# Patient Record
Sex: MALE | Race: WHITE | NOT HISPANIC OR LATINO | Employment: OTHER | URBAN - METROPOLITAN AREA
[De-identification: names, ages, dates, MRNs, and addresses within clinical notes are randomized per-mention and may not be internally consistent; named-entity substitution may affect disease eponyms.]

---

## 2017-04-14 ENCOUNTER — ALLSCRIPTS OFFICE VISIT (OUTPATIENT)
Dept: OTHER | Facility: OTHER | Age: 62
End: 2017-04-14

## 2018-01-12 VITALS
BODY MASS INDEX: 29.25 KG/M2 | HEART RATE: 54 BPM | DIASTOLIC BLOOD PRESSURE: 72 MMHG | HEIGHT: 66 IN | RESPIRATION RATE: 16 BRPM | OXYGEN SATURATION: 99 % | WEIGHT: 182 LBS | SYSTOLIC BLOOD PRESSURE: 150 MMHG

## 2018-01-23 ENCOUNTER — ALLSCRIPTS OFFICE VISIT (OUTPATIENT)
Dept: OTHER | Facility: OTHER | Age: 63
End: 2018-01-23

## 2018-01-23 DIAGNOSIS — E55.9 VITAMIN D DEFICIENCY: ICD-10-CM

## 2018-01-23 DIAGNOSIS — D56.8: ICD-10-CM

## 2018-01-23 DIAGNOSIS — I10 ESSENTIAL (PRIMARY) HYPERTENSION: ICD-10-CM

## 2018-01-24 ENCOUNTER — TELEPHONE (OUTPATIENT)
Dept: INTERNAL MEDICINE CLINIC | Facility: CLINIC | Age: 63
End: 2018-01-24

## 2018-01-24 ENCOUNTER — APPOINTMENT (OUTPATIENT)
Dept: LAB | Facility: CLINIC | Age: 63
End: 2018-01-24
Payer: MEDICARE

## 2018-01-24 DIAGNOSIS — I10 ESSENTIAL (PRIMARY) HYPERTENSION: ICD-10-CM

## 2018-01-24 DIAGNOSIS — D56.8: ICD-10-CM

## 2018-01-24 DIAGNOSIS — E55.9 VITAMIN D DEFICIENCY: ICD-10-CM

## 2018-01-24 LAB
25(OH)D3 SERPL-MCNC: 45.4 NG/ML (ref 30–100)
ALBUMIN SERPL BCP-MCNC: 3.6 G/DL (ref 3.5–5)
ALP SERPL-CCNC: 54 U/L (ref 46–116)
ALT SERPL W P-5'-P-CCNC: 64 U/L (ref 12–78)
ANION GAP SERPL CALCULATED.3IONS-SCNC: 4 MMOL/L (ref 4–13)
AST SERPL W P-5'-P-CCNC: 19 U/L (ref 5–45)
BASOPHILS # BLD AUTO: 0.04 THOUSANDS/ΜL (ref 0–0.1)
BASOPHILS NFR BLD AUTO: 1 % (ref 0–1)
BILIRUB SERPL-MCNC: 1.1 MG/DL (ref 0.2–1)
BUN SERPL-MCNC: 17 MG/DL (ref 5–25)
CALCIUM SERPL-MCNC: 8.8 MG/DL (ref 8.3–10.1)
CHLORIDE SERPL-SCNC: 105 MMOL/L (ref 100–108)
CHOLEST SERPL-MCNC: 116 MG/DL (ref 50–200)
CO2 SERPL-SCNC: 32 MMOL/L (ref 21–32)
CREAT SERPL-MCNC: 0.98 MG/DL (ref 0.6–1.3)
EOSINOPHIL # BLD AUTO: 0.34 THOUSAND/ΜL (ref 0–0.61)
EOSINOPHIL NFR BLD AUTO: 5 % (ref 0–6)
ERYTHROCYTE [DISTWIDTH] IN BLOOD BY AUTOMATED COUNT: 16.6 % (ref 11.6–15.1)
GFR SERPL CREATININE-BSD FRML MDRD: 81 ML/MIN/1.73SQ M
GLUCOSE P FAST SERPL-MCNC: 99 MG/DL (ref 65–99)
HCT VFR BLD AUTO: 32.7 % (ref 36.5–49.3)
HDLC SERPL-MCNC: 45 MG/DL (ref 40–60)
HGB BLD-MCNC: 10.5 G/DL (ref 12–17)
LDLC SERPL CALC-MCNC: 56 MG/DL (ref 0–100)
LYMPHOCYTES # BLD AUTO: 0.78 THOUSANDS/ΜL (ref 0.6–4.47)
LYMPHOCYTES NFR BLD AUTO: 11 % (ref 14–44)
MCH RBC QN AUTO: 18.9 PG (ref 26.8–34.3)
MCHC RBC AUTO-ENTMCNC: 32.1 G/DL (ref 31.4–37.4)
MCV RBC AUTO: 59 FL (ref 82–98)
MONOCYTES # BLD AUTO: 0.49 THOUSAND/ΜL (ref 0.17–1.22)
MONOCYTES NFR BLD AUTO: 7 % (ref 4–12)
NEUTROPHILS # BLD AUTO: 5.38 THOUSANDS/ΜL (ref 1.85–7.62)
NEUTS SEG NFR BLD AUTO: 76 % (ref 43–75)
PLATELET # BLD AUTO: 313 THOUSANDS/UL (ref 149–390)
PMV BLD AUTO: 10.3 FL (ref 8.9–12.7)
POTASSIUM SERPL-SCNC: 4.2 MMOL/L (ref 3.5–5.3)
PROT SERPL-MCNC: 7 G/DL (ref 6.4–8.2)
RBC # BLD AUTO: 5.56 MILLION/UL (ref 3.88–5.62)
SODIUM SERPL-SCNC: 141 MMOL/L (ref 136–145)
TRIGL SERPL-MCNC: 73 MG/DL
WBC # BLD AUTO: 7.03 THOUSAND/UL (ref 4.31–10.16)

## 2018-01-24 PROCEDURE — 82306 VITAMIN D 25 HYDROXY: CPT

## 2018-01-24 PROCEDURE — 80061 LIPID PANEL: CPT

## 2018-01-24 PROCEDURE — 85025 COMPLETE CBC W/AUTO DIFF WBC: CPT

## 2018-01-24 PROCEDURE — 80053 COMPREHEN METABOLIC PANEL: CPT

## 2018-01-24 PROCEDURE — 36415 COLL VENOUS BLD VENIPUNCTURE: CPT

## 2018-01-24 NOTE — PROGRESS NOTES
Assessment    1  Hypertension (401 9) (I10)   2  Screen for colon cancer (V76 51) (Z12 11)   3  Beta+ thalassemia, normal Hb A2, type 1, silent (282 49) (D56 8)   4  Low vitamin D level (268 9) (E55 9)   5  Benign colon polyp (211 3) (K63 5)   6  Need for zoster vaccination (V04 89) (Z23)   7  Family history of colon cancer (V16 0) (Z80 0) : Brother    Plan  Benign colon polyp, FamHx: Family history of colon cancer, Screen for colon cancer    · 2 - *EYVAZ&REILLYCOL ( COLORECTAL SURGERY, GASTROENTEROLOGY)  Co-Management  71 y/o hx of colonoscopy 4 years  ago    brother with new dx of colon cancer, needs updated colonoscopy? Status: Active   Requested for: 02EWI2402  Care Summary provided  : Yes  Beta+ thalassemia, normal Hb A2, type 1, silent    · (1) CBC/PLT/DIFF; Status:Active; Requested GTR:[id number];   Hypertension    · AmLODIPine Besylate 5 MG Oral Tablet; TAKE ONE TABLET BY MOUTH EVERY  DAY   · Lisinopril-Hydrochlorothiazide 20-12 5 MG Oral Tablet; TAKE 2 TABLETS DAILY   · (1) COMPREHENSIVE METABOLIC PANEL; Status:Active; Requested GRB:[id number];    · (1) LIPID PANEL FASTING W DIRECT LDL REFLEX; Status:Active; Requested  BOP:[id number]; Low vitamin D level    · (1) VITAMIN D 25-HYDROXY; Status:Active; Requested GISELE:[id number];   Need for pneumococcal vaccination    · Prevnar 13 Intramuscular Suspension  Need for zoster vaccination    · Zostavax 20728 UNT/0 65ML Subcutaneous Solution Reconstituted (Zostavax  84374 UNT/0 65ML Subcutaneous Solution Reconstituted); ADMINISTER SHINGLES  VACCINE ONE TIME AS DIRECTED    Discussion/Summary  Discussion Summary:   1  prevnar 13 VACCINE today  2  fasting blood work  3  medications refilled  4  talk with Dr Carrol Sampson about colonoscopy  5  return in 1 month office visit  6  check to see if shingles vaccine is covered by insurance to be given in office and let us know before your appointment so we can order     Counseling Documentation With Imm: The patient was counseled regarding diagnostic results, instructions for management, patient and family education, impressions, risks and benefits of treatment options  Immunization Counseling The parent/guardian was counseled on the following vaccine components: pneumonia and zostavax  Total number of vaccine components counseled: 2  total time of encounter was >40 minutes and >50% minutes was spent counseling  Medication SE Review and Pt Understands Tx: Possible side effects of new medications were reviewed with the patient/guardian today  The treatment plan was reviewed with the patient/guardian  The patient/guardian understands and agrees with the treatment plan      Chief Complaint  Chief Complaint Free Text Note Form: Patient is here as a new patient for a check up      History of Present Illness  HPI: 69 y/o M new to practice here to establish care    as above - reviewed meds with patient  takes CCB/ACEI/diuretic for HTN usually with good control and no SE  running low on amlodipine and taking every other day recently    UTD on flu vaccine, requests shingles vaccine which we discussed today    never had pneumonia vaccine in past  exercises regularly - mostly push-ups/sit ups and non-equipment related exercises    hx of thalessemia, was being monitored by Dr Martell Cross for this in past    taking Vit D 5000 IU per day, unsure if had level checked before    had colonoscopy in 2014, but brother(51 y/o) just dx'd with colon mass/suspected cancer and having operation to treat  requests ref to have colonoscopy again now    he is worried he may have OA of fingers, plays guitar regularly  Vijay Warren declines x-rays or further work up at this time as sx not bothersome enough    denies any other complaints      Review of Systems  Complete-Male:   Constitutional: no fever  Eyes: no eyesight problems  ENT: no sore throat  Cardiovascular: no chest pain  Respiratory: no shortness of breath  Gastrointestinal: no abdominal pain  Genitourinary: no dysuria  Musculoskeletal: no arthralgias  Integumentary: no rashes  Neurological: no confusion  Psychiatric: no depression  Endocrine: no feelings of weakness  ROS Reviewed:   ROS reviewed  Active Problems    1  Anemia (285 9) (D64 9)   2  Benign colon polyp (211 3) (K63 5)   3  Beta+ thalassemia, normal Hb A2, type 1, silent (282 49) (D56 8)   4  Hemorrhoids (455 6) (K64 9)   5  Hypertension (401 9) (I10)   6  Hypochromic/Microcytic Thalassemia (282 49)   7  Low back pain (724 2) (M54 5)   8  Need for zoster vaccination (V04 89) (Z23)   9  Pain in joint of right shoulder region (719 41) (M25 511)   10  Sciatica (724 3) (M54 30)   11  Seasonal allergies (477 9) (J30 2)   12  Sinusitis (473 9) (J32 9)   13  Tinea pedis (110 4) (B35 3)   14  Upper respiratory infection (465 9) (J06 9)    Past Medical History    1  History of Cervicalgia (723 1) (M54 2)   2  History of External Hemorrhoids (455 3)   3  History of Hay fever (477 9) (J30 1)   4  History of Herniated cervical disc (722 0) (M50 20)   5  History of benign prostatic hypertrophy (V13 89) (Z87 438)   6  History of Internal Hemorrhoids (455 0)  Active Problems And Past Medical History Reviewed: The active problems and past medical history were reviewed and updated today  Surgical History    1  History of Skin Tag Removal   2  History of Surgery Vas Deferens Vasectomy  Surgical History Reviewed: The surgical history was reviewed and updated today  Family History  Mother    1  Denied: Family history of Alcoholism and drug addiction in family   2  Denied: Family history of Anxiety and depression   3  Family history of thalassemia (V18 3) (Z83 2)   4  Family history of Heart valve replaced  Father    5  Denied: Family history of Alcoholism and drug addiction in family   6  Denied: Family history of Anxiety and depression   7  Family history of hypertension (V17 49) (Z82 49)   8   Family history of malignant neoplasm of stomach (V16 0) (Z80 0)  Child    9  Denied: Family history of Alcoholism and drug addiction in family   8  Denied: Family history of Anxiety and depression  Sibling    6  Denied: Family history of Alcoholism and drug addiction in family   15  Denied: Family history of Anxiety and depression  Brother    15  Family history of colon cancer (V16 0) (Z80 0)  Family History Reviewed: The family history was reviewed and updated today  Social History    · Alcohol Use (History)   · Drinks wine (V49 89) (Z78 9)   · Denied: History of Drug Use   · Exercise Habits   · Living Situations   · Never A Smoker   · Retired   · Uses Safety Equipment - Seatbelts  Social History Reviewed: The social history was reviewed and updated today  Current Meds   1  AmLODIPine Besylate 5 MG Oral Tablet; take one tablet once daily; Therapy: 81APQ2417 to ([phone])  Requested for: 14Apr2017; Last   Rx:14Apr2017 Ordered   2  Aspirin 81 MG TABS; TAKE 1 TABLET DAILY; Therapy: 40NUI5345 to (Last Rx:26Nov2013) Ordered   3  Co Q-10 100 MG Oral Capsule; Therapy: (Recorded:01Apr2014) to Recorded   4  Fish Oil 1000 MG Oral Capsule; 2 tabs daily; Therapy: (Recorded:21Oct2013) to Recorded   5  Glucosamine-Chondroitin Oral Capsule; Therapy: ([phone]) to Recorded   6  Lisinopril-Hydrochlorothiazide 20-12 5 MG Oral Tablet; TAKE 2 TABLETS DAILY NEED    FOLLOW UP APPOINTMENT; Therapy: 73QMP9349 to ([phone])  Requested for: 14Apr2017; Last   Rx:14Apr2017 Ordered   7  Multiple Vitamin TABS; Therapy: (Recorded:21Oct2013) to Recorded   8  Vitamin B-Complex Oral Tablet; Therapy: (Recorded:21Oct2013) to Recorded   9  Vitamin C 1000 MG Oral Tablet; 2 tabs daily; Therapy: (Recorded:21Oct2013) to Recorded   10  Vitamin D3 1000 UNIT Oral Tablet; Therapy: (Simi Benjamin) to Recorded   11  Vitamin E 400 UNIT Oral Capsule; 2 tabs; Therapy: (Recorded:21Oct2013) to Recorded    Allergies    1  Albuterol AERS    Vitals  Signs   Recorded: 30NRS4640 30:78HZ   Systolic: 377, RUE, Sitting  Diastolic: 80, RUE, Sitting  Recorded: 10NJL7272 10:23AM   Temperature: 97 6 F  Heart Rate: 76  Respiration: 16  Systolic: 587  Diastolic: 80  Height: 5 ft 6 in  Weight: 181 lb 2 oz  BMI Calculated: 29 23  BSA Calculated: 1 92  O2 Saturation: 98    Physical Exam    Constitutional   General appearance: No acute distress, well appearing and well nourished  muscular build  Eyes   Pupils and irises: Equal, round, reactive to light  Ears, Nose, Mouth, and Throat   Otoscopic examination: Tympanic membranes translucent with normal light reflex  Canals patent without erythema  Oropharynx: Normal with no erythema, edema, exudate or lesions  Pulmonary   Respiratory effort: No increased work of breathing or signs of respiratory distress  Auscultation of lungs: Clear to auscultation  Cardiovascular   Auscultation of heart: Normal rate and rhythm, normal S1 and S2, no murmurs  Examination of extremities for edema and/or varicosities: Normal     Abdomen   Abdomen: Non-tender, no masses  Musculoskeletal   Inspection/palpation of joints, bones, and muscles: Abnormal   DIP bony swelling of both hands but no tenderness or erythema  Psychiatric   Judgment and insight: Normal     Mood and affect: Normal        Results/Data  PHQ-2 Adult Depression Screening 23Jan2018 10:26AM User, Ahs     Test Name Result Flag Reference   PHQ-2 Adult Depression Score 0     Over the last two weeks, how often have you been bothered by any of the following problems?   Little interest or pleasure in doing things: Not at all - 0  Feeling down, depressed, or hopeless: Not at all - 0   PHQ-2 Adult Depression Screening Negative         Provider Comments  Provider Comments:   re-check BP at f/u visit in 1 month      Signatures   Electronically signed by : Aiden Álvarez DO; Jan 23 2018  2:09PM EST                       (Author)

## 2018-01-24 NOTE — TELEPHONE ENCOUNTER
----- Message from Aldair Ruiz DO sent at 1/24/2018  4:24 PM EST -----  Blood work is back and overall looks fine except for blood count(Hemoglobin) which is consistent with his history of thalessemia at 10 5, pls continue with current tx plan, thx

## 2018-02-08 ENCOUNTER — TELEPHONE (OUTPATIENT)
Dept: INTERNAL MEDICINE CLINIC | Facility: CLINIC | Age: 63
End: 2018-02-08

## 2018-02-08 NOTE — TELEPHONE ENCOUNTER
Anika    Do we have shingles vaccine in supply?     Pt confirmed with his insurance that it is a covered benefit to receive in office    Nonetheless, he will have to sign ABN form

## 2018-02-08 NOTE — TELEPHONE ENCOUNTER
Pt called to let you know that his insurance company will cover his shingles shot  He would like to have the shot available for him at his next ov on Feb 20th   If needed, pt can be reached at

## 2018-02-20 ENCOUNTER — TELEPHONE (OUTPATIENT)
Dept: INTERNAL MEDICINE CLINIC | Facility: CLINIC | Age: 63
End: 2018-02-20

## 2018-02-20 DIAGNOSIS — I10 ESSENTIAL HYPERTENSION: Primary | ICD-10-CM

## 2018-02-20 RX ORDER — AMLODIPINE BESYLATE 5 MG/1
5 TABLET ORAL DAILY
Qty: 90 TABLET | Refills: 1 | Status: SHIPPED | OUTPATIENT
Start: 2018-02-20 | End: 2018-02-22 | Stop reason: SDUPTHER

## 2018-02-20 RX ORDER — AMLODIPINE BESYLATE 5 MG/1
5 TABLET ORAL DAILY
Refills: 0 | COMMUNITY
Start: 2018-01-23 | End: 2018-02-20 | Stop reason: SDUPTHER

## 2018-02-20 NOTE — TELEPHONE ENCOUNTER
Spoke with wife who stated he did not receive the mail order  He only has 2 left  Please send to [facility]

## 2018-02-20 NOTE — TELEPHONE ENCOUNTER
Pt uses mail order pharmacy    Did he receive 90 day supply from mail order(sent by me 1/23/18)?     Does he need refill at [facility]?

## 2018-02-22 ENCOUNTER — OFFICE VISIT (OUTPATIENT)
Dept: INTERNAL MEDICINE CLINIC | Facility: CLINIC | Age: 63
End: 2018-02-22
Payer: MEDICARE

## 2018-02-22 VITALS
HEIGHT: 66 IN | HEART RATE: 62 BPM | BODY MASS INDEX: 29.35 KG/M2 | RESPIRATION RATE: 18 BRPM | SYSTOLIC BLOOD PRESSURE: 148 MMHG | WEIGHT: 182.6 LBS | DIASTOLIC BLOOD PRESSURE: 82 MMHG | TEMPERATURE: 98.1 F | OXYGEN SATURATION: 98 %

## 2018-02-22 DIAGNOSIS — Z23 NEED FOR SHINGLES VACCINE: Primary | ICD-10-CM

## 2018-02-22 DIAGNOSIS — I10 ESSENTIAL HYPERTENSION: ICD-10-CM

## 2018-02-22 PROCEDURE — 90471 IMMUNIZATION ADMIN: CPT | Performed by: INTERNAL MEDICINE

## 2018-02-22 PROCEDURE — G0402 INITIAL PREVENTIVE EXAM: HCPCS | Performed by: INTERNAL MEDICINE

## 2018-02-22 PROCEDURE — 90736 HZV VACCINE LIVE SUBQ: CPT | Performed by: INTERNAL MEDICINE

## 2018-02-22 PROCEDURE — 99213 OFFICE O/P EST LOW 20 MIN: CPT | Performed by: INTERNAL MEDICINE

## 2018-02-22 RX ORDER — AMLODIPINE BESYLATE 10 MG/1
10 TABLET ORAL DAILY
Qty: 90 TABLET | Refills: 1 | Status: SHIPPED | OUTPATIENT
Start: 2018-02-22 | End: 2018-08-14 | Stop reason: SDUPTHER

## 2018-02-22 RX ORDER — MULTIVIT WITH MINERALS/LUTEIN
2 TABLET ORAL DAILY
COMMUNITY

## 2018-02-22 RX ORDER — DIMENHYDRINATE 50 MG
TABLET ORAL
COMMUNITY

## 2018-02-22 RX ORDER — CHLORAL HYDRATE 500 MG
2 CAPSULE ORAL DAILY
COMMUNITY

## 2018-02-22 RX ORDER — LISINOPRIL AND HYDROCHLOROTHIAZIDE 20; 12.5 MG/1; MG/1
2 TABLET ORAL DAILY
COMMUNITY
Start: 2013-10-21 | End: 2018-05-20 | Stop reason: SDUPTHER

## 2018-02-22 NOTE — PROGRESS NOTES
Jessica Raymundo is 69 y/o M here for initial medicare annual wellness visit     AWV Clinical     ISAR:   Previous hospitalizations?:  No       Once in a Lifetime Medicare Screening:   AAA screening performed? (if performed, please add date to Health Maintenance):  No       Medicare Screening Tests and Risk Assessment:   AAA Risk Assessment    None Indicated:  Yes    Osteoporosis Risk Assessment     Alcohol use:  Yes   HIV Risk Assessment        Drug and Alcohol Use:   Tobacco use    Tobacco use duration    Tobacco Cessation Readiness    Alcohol use    Alcohol use:  never    Alcohol Treatment Readiness   Illicit Drug Use    Drug use:  never        Diet & Exercise:   Diet   What is your diet?:  Regular   How many servings a day of the following:   Fruits and Vegetables:  3-4, 1-2 Meat:  1-2   Whole Grains:  1     Soda:  0   Coffee:  0 Tea:  0   Exercise    Do you currently exercise?:  yes    Frequency:  daily    Minutes per day:  15    Type of exercise:  walking, weights       Cognitive Impairment Screening:   Cognitive Impairment Screening    Do you have difficulty learning or retaining new information?:  No Do you have difficulty handling new tasks?:  No   Do you have difficulty with reasoning?:  No Do you have difficulty with spatial ability and orientation?:  No   Do you have difficulty with language?:  No Do you have difficulty with behavior?:  No       Functional Ability/Level of Safety:   Hearing    Hearing difficulties:  No Bilateral:  normal   Hearing aid:  No    Hearing Impairment Assessment    Current Activities    Status:  unlimited ADL's, unlimited IADL's, unlimited social activities, unlimited driving   Help needed with the folllowing:    Using the phone:  No Transportation:  No   Shopping:  No Preparing Meals:  No   Doing Housework:  No Doing Laundry:  No   Managing Medications:  No Managing Money:  No   ADL    Fall Risk   Have you fallen in the last 12 months?:  No Are you unsteady on your feet?:  No Injury History       Home Safety:   Home Safety Risk Factors   Unfamilar with surroundings:  No Uneven floors:  No   Stairs or handrail saftey risk:  No Loose rugs:  No   Household clutter:  No Poor household lighting:  No   No grab bars in bathroom:  No Further evaluation needed:  No       Advanced Directives:   Advanced Directives    Living Will:  No Durable POA for healthcare:  No   Advanced directive:  No    Patient's End of Life Decisions        Urinary Incontinence:       Glaucoma:             Social History     Social History    Marital status: /Civil Union     Spouse name: N/A    Number of children: N/A    Years of education: N/A     Occupational History    Retired, IT/      Social History Main Topics    Smoking status: Never Smoker    Smokeless tobacco: Not on file    Alcohol use Yes      Comment: drinks wine    Drug use: No    Sexual activity: Not on file     Other Topics Concern    Not on file     Social History Narrative    Exercise habits    Living situations, wife and dog    Uses safety equipment- Seatbelts     Past Medical History:   Diagnosis Date    Cervicalgia 10/29/2014    Prostatic hypertrophy     Benign     Vitals:    02/22/18 1023 02/22/18 1049   BP: (!) 172/90 148/82   BP Location:  Right arm   Patient Position:  Sitting   Cuff Size:  Standard   Pulse: 62    Resp: 18    Temp: 98 1 °F (36 7 °C)    SpO2: 98%    Weight: 82 8 kg (182 lb 9 6 oz)    Height: 5' 6" (1 676 m)        Current Outpatient Prescriptions:     amLODIPine (NORVASC) 10 mg tablet, Take 1 tablet (10 mg total) by mouth daily, Disp: 90 tablet, Rfl: 1    Ascorbic Acid (VITAMIN C) 1000 MG tablet, Take 2 tablets by mouth daily, Disp: , Rfl:     aspirin 81 MG tablet, Take 1 tablet by mouth daily, Disp: , Rfl:     B Complex Vitamins (VITAMIN B-COMPLEX 100 IJ), Take by mouth, Disp: , Rfl:     coenzyme Q-10 100 MG capsule, Take by mouth, Disp: , Rfl:     lisinopril-hydrochlorothiazide (PRINZIDE,ZESTORETIC) 20-12 5 MG per tablet, Take 2 tablets by mouth daily, Disp: , Rfl:     Multiple Vitamin-Folic Acid TABS, Take by mouth, Disp: , Rfl:     Omega-3 Fatty Acids (FISH OIL) 1,000 mg, Take 2 tablets by mouth daily, Disp: , Rfl:   Allergies   Allergen Reactions    Albuterol Throat Swelling       Plan:    Provider Screening     Preventative Screening/Counseling:   Cardiovascular Screening/Counseling:   (Labs Q5 years, EKG optional one-time)   General:  Screening Not Indicated Counseling:  Improve Blood Pressure, Healthy Diet          Diabetes Screening/Counseling:   (2 tests/year if Pre-Diabetes or 1 test/year if no Diabetes)   General:  Screening Current           Colorectal Cancer Screening/Counseling:   (FOBT Q1 yr; Flex Sig Q4 yrs or Q10 yrs after Screening Colonoscopy; Screening Colonoscpy Q2 yrs High Risk or Q10 yrs Low Risk; Barium Enema Q2 yrs High Risk or Q4 yrs Low Risk)   General:  Screening Current           Prostate Cancer Screening/Counseling:   (Annual)          Breast Cancer Screening/Counseling:   (Baseline Age 28 - 43; Annual Age 36+)         Cervical Cancer Screening/Counseling:   (Annual for High Risk or Childbearing Age with Abnormal Pap in Last 3 yrs; Every 2 all others)         Osteoporosis Screening/Counseling:   (Every 2 Yrs if at risk or more if medically necessary)   General:  Screening Not Indicated           AAA Screening/Counseling:   (Once per Lifetime with risk factors)     Age over 72 (males only):  Yes    General:  Screening Not Indicated           Glaucoma Screening/Counseling:   (Annual)         HIV Screening/Counseling:   (Voluntary; Once annually for high risk OR 3 times for Pregnancy at diagnosis of IUP; 3rd trimester; and at Labor         Hepatitis C Screening:             Immunizations:   Influenza (annual):   Influenza UTD This Year   Pneumococcal (Once in a Lifetime):  Lifetime Vaccine Completed   Zostavax (Medicare D Coverage, Pt >72 yo):  Zostavox Vaccine Needed Today   Tdap (Non-Medicare Wellness Visit required):   Tdap Vaccine UTD       Other Preventative Couseling (Non-Medicare Wellness Visit Required):   nutrition counseling performed       Referrals (Non-Medicare Wellness Visit Required):       Medical Equipment/Suppliers:

## 2018-02-22 NOTE — PATIENT INSTRUCTIONS
1  Shingles vaccine today  2  Increase amlodipine to 10mg once a day  3  Return for blood pressure re-check on 3/13/18  4  Recommend to go back and see Dr Ricardo Grullon for thalessmia    Shingles Vaccine   AMBULATORY CARE:   The shingles vaccine  is an injection to protect you from the herpes-zoster virus  This is the same virus that causes chickenpox  Shingles is a painful rash that develops in people who had chickenpox  You can get the shingles vaccine even if you do not know if you had chickenpox  If you have had shingles, the vaccine can help prevent another outbreak  Who should get the shingles vaccine:   · Anyone 60 years or older    · Anyone who has had shingles, to prevent another outbreak  Who should not get the shingles vaccine or should wait to get it: If you have a temperature of 101 3°F or higher, wait to get the vaccine until symptoms get better  You should not get the vaccine if:  · You had a severe allergic reaction to gelatin, the antibiotic neomycin, or any part of the vaccine    · You have a weakened immune system from HIV or AIDS, are taking a drug such as steroids or cancer treatment, or have certain cancers    · You are pregnant or plan to become pregnant within 4 weeks of receiving the vaccine  Risks of the shingles vaccine: You may develop a rash that looks like chickenpox near the injection site  The site may also be red, sore, swollen, or itch  You may get shingles even after you receive the vaccine  You may have an allergic reaction  This can be life-threatening  Call 911 if:   · You have signs of a severe allergic reaction, such as trouble breathing, swelling in your throat, or hives  Seek care immediately if:   · You have a high fever or behavior changes that concern you  Contact your healthcare provider if:   · You have questions or concerns about the shingles vaccine  Apply a warm compress  to the area to relieve swelling and pain   If you develop a chickenpox-like rash near the injection site, cover the rash until it goes away  Follow up with your healthcare provider as directed:  Write down your questions so you remember to ask them during your visits  © 2017 [employer] Information is for End User's use only and may not be sold, redistributed or otherwise used for commercial purposes  All illustrations and images included in CareNotes® are the copyrighted property of A [employer] M [employer] Inc  or [employer]  The above information is an  only  It is not intended as medical advice for individual conditions or treatments  Talk to your doctor, nurse or pharmacist before following any medical regimen to see if it is safe and effective for you

## 2018-02-22 NOTE — Clinical Note
Hi   Pt has history of thalessemia, last seen by you 2+ yrs ago  Does he need a f/u with you  Martin Williamson Davon Williamson Hgb is 10 5 on most recent BW  thx

## 2018-02-22 NOTE — PROGRESS NOTES
Assessment/Plan:       Diagnoses and all orders for this visit:    Need for shingles vaccine  Comments:  discussed risk/benefit and side effects of shingles vaccine, pt verbalized understanding & rec'd vaccine today  Orders: -     Varicella-zoster vaccine subcutaneous    Essential hypertension  -     amLODIPine (NORVASC) 10 mg tablet; Take 1 tablet (10 mg total) by mouth daily    Other orders  -     lisinopril-hydrochlorothiazide (PRINZIDE,ZESTORETIC) 20-12 5 MG per tablet; Take 2 tablets by mouth daily  -     Omega-3 Fatty Acids (FISH OIL) 1,000 mg; Take 2 tablets by mouth daily  -     coenzyme Q-10 100 MG capsule; Take by mouth  -     Multiple Vitamin-Folic Acid TABS; Take by mouth  -     B Complex Vitamins (VITAMIN B-COMPLEX 100 IJ); Take by mouth  -     Ascorbic Acid (VITAMIN C) 1000 MG tablet; Take 2 tablets by mouth daily  -     aspirin 81 MG tablet; Take 1 tablet by mouth daily      tips provided on how to check BP at home with home cuff    Subjective:      Patient ID: Sruthi Armstrong is a 70 y o  male  HPI     Here for follow up  Reviewed most recent BW results today with Henny Zaragoza  BP elevated in office today(improved when re-checked and relaxed but still high 140s) and was elevated at [facility] when checked yesterday  Taking BP medication as prescribed  Exercising regularly in morning(2 hrs prior to appt today)  Requests shingles vaccine, reports his insurance approves him having this in office  Denies any other complaints      Social History     Social History    Marital status: /Civil Union     Spouse name: N/A    Number of children: N/A    Years of education: N/A     Occupational History    Retired, IT/      Social History Main Topics    Smoking status: Never Smoker    Smokeless tobacco: Not on file    Alcohol use Yes      Comment: drinks wine    Drug use: No    Sexual activity: Not on file     Other Topics Concern    Not on file     Social History Narrative    Exercise habits Living situations, wife and dog    Uses safety equipment- Seatbelts     Past Medical History:   Diagnosis Date    Cervicalgia 10/29/2014    Prostatic hypertrophy     Benign     Vitals:    02/22/18 1023 02/22/18 1049   BP: (!) 172/90 148/82   BP Location:  Right arm   Patient Position:  Sitting   Cuff Size:  Standard   Pulse: 62    Resp: 18    Temp: 98 1 °F (36 7 °C)    SpO2: 98%    Weight: 82 8 kg (182 lb 9 6 oz)    Height: 5' 6" (1 676 m)        Current Outpatient Prescriptions:     amLODIPine (NORVASC) 10 mg tablet, Take 1 tablet (10 mg total) by mouth daily, Disp: 90 tablet, Rfl: 1    Ascorbic Acid (VITAMIN C) 1000 MG tablet, Take 2 tablets by mouth daily, Disp: , Rfl:     aspirin 81 MG tablet, Take 1 tablet by mouth daily, Disp: , Rfl:     B Complex Vitamins (VITAMIN B-COMPLEX 100 IJ), Take by mouth, Disp: , Rfl:     coenzyme Q-10 100 MG capsule, Take by mouth, Disp: , Rfl:     lisinopril-hydrochlorothiazide (PRINZIDE,ZESTORETIC) 20-12 5 MG per tablet, Take 2 tablets by mouth daily, Disp: , Rfl:     Multiple Vitamin-Folic Acid TABS, Take by mouth, Disp: , Rfl:     Omega-3 Fatty Acids (FISH OIL) 1,000 mg, Take 2 tablets by mouth daily, Disp: , Rfl:   Allergies   Allergen Reactions    Albuterol Throat Swelling         Review of Systems   Constitutional: Negative for fever  HENT: Negative for congestion  Eyes: Negative for visual disturbance  Respiratory: Negative for shortness of breath  Cardiovascular: Negative for chest pain  Gastrointestinal: Negative for abdominal pain  Neurological: Negative for dizziness  Psychiatric/Behavioral: Negative for behavioral problems and dysphoric mood           Objective:      /82 (BP Location: Right arm, Patient Position: Sitting, Cuff Size: Standard)   Pulse 62   Temp 98 1 °F (36 7 °C)   Resp 18   Ht 5' 6" (1 676 m)   Wt 82 8 kg (182 lb 9 6 oz)   SpO2 98%   BMI 29 47 kg/m²          Physical Exam   Constitutional: He is oriented to person, place, and time  He appears well-developed and well-nourished  HENT:   Head: Normocephalic and atraumatic  Eyes: Conjunctivae are normal  Pupils are equal, round, and reactive to light  Cardiovascular: Normal rate, regular rhythm and normal heart sounds  No murmur heard  Pulmonary/Chest: Effort normal and breath sounds normal  He has no wheezes  He has no rales  Abdominal: Soft  Bowel sounds are normal  There is no tenderness  Musculoskeletal: He exhibits no edema  Neurological: He is alert and oriented to person, place, and time  Psychiatric: He has a normal mood and affect  His behavior is normal    Vitals reviewed        Results for orders placed or performed in visit on 01/24/18   Comprehensive metabolic panel   Result Value Ref Range    Sodium 141 136 - 145 mmol/L    Potassium 4 2 3 5 - 5 3 mmol/L    Chloride 105 100 - 108 mmol/L    CO2 32 21 - 32 mmol/L    Anion Gap 4 4 - 13 mmol/L    BUN 17 5 - 25 mg/dL    Creatinine 0 98 0 60 - 1 30 mg/dL    Glucose, Fasting 99 65 - 99 mg/dL    Calcium 8 8 8 3 - 10 1 mg/dL    AST 19 5 - 45 U/L    ALT 64 12 - 78 U/L    Alkaline Phosphatase 54 46 - 116 U/L    Total Protein 7 0 6 4 - 8 2 g/dL    Albumin 3 6 3 5 - 5 0 g/dL    Total Bilirubin 1 10 (H) 0 20 - 1 00 mg/dL    eGFR 81 ml/min/1 73sq m   Lipid Panel with Direct LDL reflex   Result Value Ref Range    Cholesterol 116 50 - 200 mg/dL    Triglycerides 73 <=150 mg/dL    HDL, Direct 45 40 - 60 mg/dL    LDL Calculated 56 0 - 100 mg/dL   CBC and differential   Result Value Ref Range    WBC 7 03 4 31 - 10 16 Thousand/uL    RBC 5 56 3 88 - 5 62 Million/uL    Hemoglobin 10 5 (L) 12 0 - 17 0 g/dL    Hematocrit 32 7 (L) 36 5 - 49 3 %    MCV 59 (L) 82 - 98 fL    MCH 18 9 (L) 26 8 - 34 3 pg    MCHC 32 1 31 4 - 37 4 g/dL    RDW 16 6 (H) 11 6 - 15 1 %    MPV 10 3 8 9 - 12 7 fL    Platelets 333 454 - 859 Thousands/uL    Neutrophils Relative 76 (H) 43 - 75 %    Lymphocytes Relative 11 (L) 14 - 44 %    Monocytes Relative 7 4 - 12 %    Eosinophils Relative 5 0 - 6 %    Basophils Relative 1 0 - 1 %    Neutrophils Absolute 5 38 1 85 - 7 62 Thousands/µL    Lymphocytes Absolute 0 78 0 60 - 4 47 Thousands/µL    Monocytes Absolute 0 49 0 17 - 1 22 Thousand/µL    Eosinophils Absolute 0 34 0 00 - 0 61 Thousand/µL    Basophils Absolute 0 04 0 00 - 0 10 Thousands/µL   Vitamin D 25 hydroxy   Result Value Ref Range    Vit D, 25-Hydroxy 45 4 30 0 - 100 0 ng/mL

## 2018-02-23 ENCOUNTER — TELEPHONE (OUTPATIENT)
Dept: INTERNAL MEDICINE CLINIC | Facility: CLINIC | Age: 63
End: 2018-02-23

## 2018-02-23 NOTE — TELEPHONE ENCOUNTER
----- Message from Oseas Soares PA-C sent at 2/22/2018  7:14 PM EST -----  Dr Ruth Mac is out  I am covering  Hemoglobin has been stable since 2013  No treatment for thalassemia  We do not need to see at this time  If hemoglobin were to drop, there could be other reason beside thalassemia and we would be welcome to see him at that time  Follow up at this time it not needed though  Thanks,   Skye Núñez PA-C   Heme Onc     ----- Message -----  From: Saul Barrientos DO  Sent: 2/22/2018  10:52 AM  To: Allison Stahl MD    Hi     Pt has history of thalessemia, last seen by you 2+ yrs ago  Does he need a f/u with you  Marcelo Maria Hgb is 10 5 on most recent BW    thx

## 2018-03-13 ENCOUNTER — CLINICAL SUPPORT (OUTPATIENT)
Dept: INTERNAL MEDICINE CLINIC | Facility: CLINIC | Age: 63
End: 2018-03-13

## 2018-03-13 VITALS — SYSTOLIC BLOOD PRESSURE: 142 MMHG | HEART RATE: 58 BPM | DIASTOLIC BLOOD PRESSURE: 86 MMHG

## 2018-03-13 DIAGNOSIS — I10 ESSENTIAL HYPERTENSION: Primary | ICD-10-CM

## 2018-03-13 PROCEDURE — RECHECK: Performed by: INTERNAL MEDICINE

## 2018-03-13 RX ORDER — VITAMIN E 268 MG
400 CAPSULE ORAL 2 TIMES DAILY
COMMUNITY

## 2018-04-13 ENCOUNTER — OFFICE VISIT (OUTPATIENT)
Dept: INTERNAL MEDICINE CLINIC | Facility: CLINIC | Age: 63
End: 2018-04-13
Payer: MEDICARE

## 2018-04-13 VITALS
OXYGEN SATURATION: 98 % | DIASTOLIC BLOOD PRESSURE: 62 MMHG | HEART RATE: 60 BPM | WEIGHT: 176.4 LBS | HEIGHT: 67 IN | TEMPERATURE: 98.4 F | BODY MASS INDEX: 27.69 KG/M2 | SYSTOLIC BLOOD PRESSURE: 130 MMHG | RESPIRATION RATE: 18 BRPM

## 2018-04-13 DIAGNOSIS — M54.12 CERVICAL RADICULOPATHY: Primary | ICD-10-CM

## 2018-04-13 PROCEDURE — 99214 OFFICE O/P EST MOD 30 MIN: CPT | Performed by: INTERNAL MEDICINE

## 2018-04-13 NOTE — PROGRESS NOTES
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  Has had similar sx in 2014 s/p steroid dose pack, PT and spine surgery eval after C-spine MRI  Sx improved with PT  Taking nsaids with some benefit, denies muscle spasms but when turns his head, develops shooting pain down his right arm  No weakness or numbness in extremities and no fall/injury or other complaints  Past Medical History:   Diagnosis Date    Cervicalgia 10/29/2014    Prostatic hypertrophy     Benign     Vitals:    04/13/18 1426   BP: 130/62   Pulse: 60   Resp: 18   Temp: 98 4 °F (36 9 °C)   SpO2: 98%   Weight: 80 kg (176 lb 6 4 oz)   Height: 5' 7" (1 702 m)     Body mass index is 27 63 kg/m²      Current Outpatient Prescriptions:     amLODIPine (NORVASC) 10 mg tablet, Take 1 tablet (10 mg total) by mouth daily, Disp: 90 tablet, Rfl: 1    Ascorbic Acid (VITAMIN C) 1000 MG tablet, Take 2 tablets by mouth daily, Disp: , Rfl:     aspirin 81 MG tablet, Take 1 tablet by mouth daily, Disp: , Rfl:     B Complex Vitamins (VITAMIN B-COMPLEX 100 IJ), Take by mouth, Disp: , Rfl:     BETA CAROTENE PO, Take by mouth, Disp: , Rfl:     CHROMIUM PO, Take by mouth, Disp: , Rfl:     coenzyme Q-10 100 MG capsule, Take by mouth, Disp: , Rfl:     Lactobacillus (ACIDOPHILUS PO), Take by mouth, Disp: , Rfl:     lisinopril-hydrochlorothiazide (PRINZIDE,ZESTORETIC) 20-12 5 MG per tablet, Take 2 tablets by mouth daily, Disp: , Rfl:     Multiple Vitamin-Folic Acid TABS, Take by mouth, Disp: , Rfl:    Omega-3 Fatty Acids (FISH OIL) 1,000 mg, Take 2 tablets by mouth daily, Disp: , Rfl:     Prasterone, DHEA, (DHEA 50 PO), Take 50 mg by mouth daily, Disp: , Rfl:     vitamin E, tocopherol, 400 units capsule, Take 400 Units by mouth 2 (two) times a day, Disp: , Rfl:   Allergies   Allergen Reactions    Albuterol Throat Swelling         Review of Systems   Constitutional: Negative for fever  HENT: Negative for congestion  Respiratory: Negative for shortness of breath  Cardiovascular: Negative for chest pain  Gastrointestinal: Negative for abdominal pain  Genitourinary: Negative for difficulty urinating  Musculoskeletal: Positive for arthralgias and neck pain  Neurological: Negative for headaches  Psychiatric/Behavioral: Negative for dysphoric mood  Objective:      /62   Pulse 60   Temp 98 4 °F (36 9 °C)   Resp 18   Ht 5' 7" (1 702 m)   Wt 80 kg (176 lb 6 4 oz)   SpO2 98%   BMI 27 63 kg/m²          Physical Exam   Constitutional: He is oriented to person, place, and time  He appears well-developed and well-nourished  HENT:   Head: Normocephalic and atraumatic  Eyes: Conjunctivae are normal  Pupils are equal, round, and reactive to light  Cardiovascular: Normal rate, regular rhythm and normal heart sounds  No murmur heard  Pulmonary/Chest: Effort normal and breath sounds normal  He has no wheezes  He has no rales  Abdominal: Soft  Bowel sounds are normal  There is no tenderness  Musculoskeletal: He exhibits no edema  No spinal or paraspinal tenderness in cervical spine or musculature  +reproducible radicular pain with passive ROM of neck   Neurological: He is alert and oriented to person, place, and time  He has normal strength  Psychiatric: He has a normal mood and affect  His behavior is normal    Vitals reviewed  MRI CERVICAL SPINE WITHOUT CONTRAST   INDICATION-  Numbness, tingling, and pain in the right arm     COMPARISON-  Radiographs performed September 10, 2014  TECHNIQUE-  Sagittal T1, sagittal T2, sagittal inversion recovery,   axial T2, axial 2-D MERGE  IMAGE QUALITY-  Diagnostic   FINDINGS-   ALIGNMENT-  There is straightening of normal cervical lordosis without   anterolisthesis or retrolisthesis  The appearance of subtle   retrolisthesis of C5 relative to C6 on the recent radiographs appears   to have been artifactual related to the presence of circumferential   disc osteophyte complex at this level  No compression deformity  No   scoliosis  MARROW SIGNAL-  Small hemangioma is noted in the posterior elements on   the right at C3  Mild endplate degenerative signal is noted at C5-C6  No suspicious discrete marrow lesion is identified  CERVICAL AND VISUALIZED THORACIC CORD-  Normal signal within the   visualized cord  PREVERTEBRAL AND PARASPINAL SOFT TISSUES-  There is a cystic and solid   left thyroid nodule measuring 3 1 cm  Prevertebral and paraspinal soft   tissues appear otherwise unremarkable  VISUALIZED POSTERIOR FOSSA-  The visualized posterior fossa   demonstrates no abnormal signal    CERVICAL DISC SPACES-        C2-C3-  Normal    C3-C4-  Mild degenerative uncinate and facet hypertrophy  Mild   noncompressive biforaminal narrowing  No central canal stenosis  C4-C5-  Mild degenerative uncinate and facet hypertrophy  Mild   noncompressive central canal narrowing  C5-C6-  Loss of disc height with mild endplate degenerative signal   abnormality  Circumferential disc osteophyte complex and mild   hypertrophic facet degenerative changes result in mild central canal   narrowing and moderate to severe right greater than left biforaminal   narrowing  Correlate for right greater than left C6 radicular symptoms  C6-C7-  Mild degenerative uncinate and facet hypertrophy bilaterally  Mild biforaminal narrowing  No significant central canal stenosis     C7-T1-  Normal    UPPER THORACIC DISC SPACES-  Tiny T2-T3 protrusion  No significant   central canal or foraminal stenosis in the visualized upper thoracic   spine  IMPRESSION-   Straightening of cervical lordosis  Mild and moderate cervical   degenerative changes, most severe at C5-C6 where degenerative uncinate   and facet hypertrophy results in moderate to severe biforaminal   narrowing  Correlate for right greater than left C6 radicular symptoms     Transcribed on- [id number]           CHRISTINE Campbell MD        [place] Radiologist- CHRISTINE Bartlett MD        Electronically CHRISTINE Schultz MD        Released Date Time- 10/22/14 1138

## 2018-04-13 NOTE — PATIENT INSTRUCTIONS
1  Take naproxen 2 tablets(440mg) twice a day for next 3 days, then once a day for 2-3 days then as needed  2  Rest, heat pad as needed  3  Physical therapy  4  Call if not improved    Cervical Radiculopathy   WHAT YOU NEED TO KNOW:   Cervical radiculopathy is a painful condition that happens when a spinal nerve in your neck is pinched or irritated  DISCHARGE INSTRUCTIONS:   Medicines: You may need any of the following:  · NSAIDs  help decrease swelling and pain  This medicine can be bought without a doctor's order  This medicine can cause stomach bleeding or kidney problems in certain people  If you take blood thinner medicine, always ask your healthcare provider if NSAIDs are safe for you  Always read the medicine label and follow the directions on it before using this medicine  · Prescription pain medicine  helps decrease pain  Do not wait until the pain is severe before you take this medicine  · Steroids  help decrease pain and swelling  These may be given as a pill or as an injection in your neck  You may need more than 1 injection if your symptoms do not improve after the first treatment  · Take your medicine as directed  Contact your healthcare provider if you think your medicine is not helping or if you have side effects  Tell him of her if you are allergic to any medicine  Keep a list of the medicines, vitamins, and herbs you take  Include the amounts, and when and why you take them  Bring the list or the pill bottles to follow-up visits  Carry your medicine list with you in case of an emergency  Follow up with your healthcare provider or spine specialist as directed:  Write down your questions so you remember to ask them during your visits  Physical therapy:  Your healthcare provider may suggest physical therapy to stretch and strengthen your muscles  Your physical therapist can teach you how to improve your posture and the way you hold your neck   He may also teach you how to be safely active and avoid further injury  He can also help you develop an exercise program that is safe for your back and neck  Self-care:   · Ice  helps decrease swelling and pain  Ice may also help prevent tissue damage  Use an ice pack, or put crushed ice in a plastic bag  Cover it with a towel and place it on your neck for 15 to 20 minutes every hour or as directed  · Rest  when you feel it is needed  Slowly start to do more each day  Return to your daily activities as directed  · Wear a soft collar  You may be given a soft collar to support your neck while you sleep  Wear the soft collar only as directed  · Do light stretches and regular exercise  Your healthcare provider may suggest light stretches to help decrease stiffness in your neck and arm as you recover  After your pain is controlled, you may benefit from regular exercise  Ask what type of exercise is safe for your back and neck  · Review your work area  A comfortable work area can help prevent neck strain  Ask your employer for an ergonomic review to check the position of your desk, chair, phone, and computer  Make any necessary adjustments for your comfort  Contact your healthcare provider or spine specialist if:   · You have a fever  · You are losing weight without trying  · Your pain is worse, even with medicine  · One or both hands feel more numb than before, or you cannot move your fingers well  · You have questions or concerns about your condition or care  © 2017 [address] Information is for End User's use only and may not be sold, redistributed or otherwise used for commercial purposes  All illustrations and images included in CareNotes® are the copyrighted property of [employer]  or [employer]  The above information is an  only  It is not intended as medical advice for individual conditions or treatments   Talk to your doctor, nurse or pharmacist before following any medical regimen to see if it is safe and effective for you

## 2018-04-17 ENCOUNTER — EVALUATION (OUTPATIENT)
Dept: PHYSICAL THERAPY | Facility: REHABILITATION | Age: 63
End: 2018-04-17
Payer: MEDICARE

## 2018-04-17 DIAGNOSIS — M54.12 CERVICAL RADICULOPATHY: Primary | ICD-10-CM

## 2018-04-17 PROCEDURE — 97161 PT EVAL LOW COMPLEX 20 MIN: CPT | Performed by: PHYSICAL THERAPIST

## 2018-04-17 PROCEDURE — 97140 MANUAL THERAPY 1/> REGIONS: CPT | Performed by: PHYSICAL THERAPIST

## 2018-04-17 PROCEDURE — G8985 CARRY GOAL STATUS: HCPCS | Performed by: PHYSICAL THERAPIST

## 2018-04-17 PROCEDURE — G8984 CARRY CURRENT STATUS: HCPCS | Performed by: PHYSICAL THERAPIST

## 2018-04-17 NOTE — PROGRESS NOTES
PT Evaluation     Today's date: 2018  Patient name: Sandra Lee  : 1955  MRN: [id number]  Referring provider: Jame Natarajan DO  Dx:   Encounter Diagnosis     ICD-10-CM    1  Radiculopathy, cervical region M54 12                   Assessment  Impairments: abnormal muscle tone, abnormal or restricted ROM, activity intolerance, impaired physical strength, lacks appropriate home exercise program and pain with function  Functional limitations: Patient reports to evaluation with Radiculopathy, cervical region  (primary encounter diagnosis) with the following functional impairments: reaching, lifting, driving, lateral rotation of the head, extension, sleeping at night  Assessment details: Sandra Lee is a 70y o  year old male who presents to [facility] with:   Radiculopathy, cervical region  (primary encounter diagnosis)    Nikita Proper presents with the impairments as listed above and would benefit from Physical Therapy to address these impairments and to maximize function  Understanding of Dx/Px/POC: good   Prognosis: good  Prognosis details: Nikita Proper prognosis may be affected by the following: HTN, arthritis    Goals  Short-Term Goals:   1  Patient will increased ROM by 10 degrees in 4 weeks  2  Patient will report pain less than 3/10 in 4 weeks  Long-Term Goals:   1  Patient will demonstrate symmetrical cervical lateral rotation at time of discharge  2  Patient independent with HEP at time of discharge  3  Patient will be able to lift and perform IADL with R UE with minimal to no pain at time of discharge       Plan  Patient would benefit from: PT eval and skilled PT  Planned modality interventions: thermotherapy: hydrocollator packs and unattended electrical stimulation  Planned therapy interventions: therapeutic exercise, therapeutic activities, stretching, strengthening, postural training, patient education, home exercise program, IADL retraining, joint mobilization, manual therapy and neuromuscular re-education  Frequency: 2x week  Duration in visits: 12  Duration in weeks: 6  Treatment plan discussed with: patient  Plan details: Thank you for referring Alvin Connelly to Physical Therapy at [place] [address] and for the opportunity to coordinate care  Subjective Evaluation    History of Present Illness  Mechanism of injury: Zen Vicente presents to  with diagnosis of cervical radiculopathy  He reports pain began when he was lifting a guitar amplifier with R UE at the end of March  He reports "it's the same thing that happened 3 years ago " He reports "the right shoulder and arm are weak and feel dead " He denies any N/T at this time in R UE   He reports increased pain with looking up, looking to the right, reaching, vacuuming, and lifting with R UE   Patient denies dizziness, diplopia, dysphasia, dysphagia, and drop attacks at this time       CONSTITUTIONAL: No fever, no chills, no malaise, no recent weight loss or gain   EYES: No complaints of vision changes, no red eyes, no diplopia   ENT: no loss of hearing, no tinnitus, no nosebleeds, no sore throat, no dysphasia, no dysphagia  CARDIOVASCULAR: no complaints of chest pain, no palpitations, no LE edema  RESPIRATORY: no complaints of SOB, no wheezing, no cough  GASTROINTESTINAL: no reports of abdominal pain, no constipation, no diarrhea, no vomiting, no bloody stools, no other changes in digestion, no loss of control of bowel  GENITOURINARY: no reports of dysuria, no incontinence, no loss of control of bladder  INTEGUMENTARY: no complaints of skin rash or irritation, no bleeding or drainage   NEUROLOGICAL:  DTR, myotomes, and dermatomes performed in neurological section  ALLERGIES:   SURGERIES:   MEDICATIONS:       Quality of life: fair    Pain  Current pain ratin  At best pain ratin  At worst pain ratin  Location: Cervical spine and R UE   Quality: throbbing  Aggravating factors: lifting and overhead activity  Progression: no change    Hand dominance: right      Diagnostic Tests  No diagnostic tests performed  Treatments  Previous treatment: physical therapy  Current treatment: physical therapy  Patient Goals  Patient goals for therapy: decreased pain, increased motion, increased strength and independence with ADLs/IADLs  Patient goal: "get it to the point where I"m not feeling this constant throbbing thing in my right arm and shoulder "         Objective     Special Questions  Negative for dizziness, faints, headaches, trouble swallowing, difficulty breathing and visual change    Postural Observations  Seated posture: poor  Standing posture: poor    Additional Postural Observation Details  Patient demonstrates increased thoracic kyphosis and cervical protrusion, rounded shoulders  Palpation     Right   Hypertonic in the upper trapezius  Tenderness of the cervical interspinals and upper trapezius  Trigger point to upper trapezius  Additional Palpation Details  Tenderness along C5-T3 R interspinal musculature  Neurological Testing     Sensation   Cervical/Thoracic   Left   Intact: light touch    Right   Intact: light touch    Reflexes   Left   Biceps (C5/C6): normal (2+)  Brachioradialis (C6): normal (2+)  Triceps (C7): normal (2+)    Right   Biceps (C5/C6): normal (2+)  Brachioradialis (C6): normal (2+)  Triceps (C7): normal (2+)    Additional Neurological Details  Patient denies N/T in R UE at this time         Active Range of Motion   Cervical/Thoracic Spine   Cervical    Flexion: 50 degrees   Extension: 20 degrees with pain  Left lateral flexion: 35 degrees   Right lateral flexion: 20 degrees with pain  Left rotation: 80 degrees   Right rotation: 60 degrees with pain    Joint Play Hypomobile: C5, C6, C7, T1, T2 and T3 Pain: C5 and C6     Strength/Myotome Testing   Cervical Spine     Left   Interossei strength (t1): 4  Neck lateral flexion (C3): WFL    Right   Interossei strength (t1): 4etr  Neck lateral flexion (C3): WFL    Left Shoulder     Planes of Motion   Flexion: 5   Extension: 5 and WFL   Abduction: 5 and WFL   Adduction: WFL   External rotation at 0°: 5   Internal rotation at 0°: 5     Right Shoulder     Planes of Motion   Flexion: 4   Extension: 4 and WFL   Abduction: 4 and WFL   Adduction: WFL   External rotation at 0°: 4   Internal rotation at 0°: 4     Left Elbow   Flexion: 5 and WFL  Extension: 5    Right Elbow   Flexion: 5 and WFL  Extension: 5    Left Wrist/Hand   Wrist extension: 5 and WFL  Wrist flexion: 5 and WFL  Radial deviation: WFL    Right Wrist/Hand   Wrist extension: 5 and WFL  Wrist flexion: 5 and WFL  Radial deviation: WFL    Tests   Cervical     Left   Negative alar ligament integrity, cervical distraction, Spurling's sign and cervical compression test       Right   Positive Spurling's sign     Negative alar ligament integrity, cervical distraction and cervical compression test             Precautions: HTN, arthritis   Access code: [email or web address]  * Indicates part of HEP     Daily Treatment Diary     Manual  4/17            Bilateral side glides             Prone face cutout R rotation opening 10 min            Prone Thoracic P/A mobs grade II                                           Exercise Diary  4/17            Upper trapezius stretch             Levator stretch             3 finger ROM* :05x5            Scapular retraction             Sidelying ER             TB rows             TB LPD             Cervical SNAGs extension, rotation :05x5 ea                                                                                                                                                                            Modalities

## 2018-04-20 ENCOUNTER — OFFICE VISIT (OUTPATIENT)
Dept: PHYSICAL THERAPY | Facility: REHABILITATION | Age: 63
End: 2018-04-20
Payer: MEDICARE

## 2018-04-20 DIAGNOSIS — M54.12 CERVICAL RADICULOPATHY: Primary | ICD-10-CM

## 2018-04-20 PROCEDURE — 97112 NEUROMUSCULAR REEDUCATION: CPT

## 2018-04-20 PROCEDURE — 97110 THERAPEUTIC EXERCISES: CPT

## 2018-04-20 PROCEDURE — 97140 MANUAL THERAPY 1/> REGIONS: CPT

## 2018-04-20 NOTE — PROGRESS NOTES
Daily Note     Today's date: 2018  Patient name: Olga Dean  : 1955  MRN: [id number]  Referring provider: Kaylyn Walton DO  Dx:   Encounter Diagnosis     ICD-10-CM    1  Cervical radiculopathy M54 12                   Subjective: Patient reports minimal neck and right upper extremity discomfort prior to session today stating "no change yet, but I know that's normal " He reports compliance with HEP  Objective: See treatment diary below  Precautions: HTN, arthritis   Access code: [email or web address]  * Indicates part of HEP     Daily Treatment Diary     Manual             Bilateral side glides             Prone face cutout R rotation opening 10 min 10 min           Prone Thoracic P/A mobs grade II                                           Exercise Diary             Upper trapezius stretch  :05x10 ea           Levator stretch  :05x10 ea           3 finger ROM* :05x5 :05x10 ea           Scapular retraction  2x10           Sidelying ER  2x10 ea           TB rows  OTB 2x10           TB LPD  OTB 2x10           Cervical SNAGs extension, rotation :05x5 ea :05x10 ea                                                                                                                                                                           Modalities                                                               Assessment: Tolerated treatment well  Patient demonstrated fatigue post treatment, exhibited good technique with therapeutic exercises and would benefit from continued PT Initiated POC this session where patient tolerated well, with patient had most difficulty with right sided activity  Cues required for proper hold time with all stretching  Plan: Continue per plan of care  Progress treatment as tolerated

## 2018-04-24 ENCOUNTER — OFFICE VISIT (OUTPATIENT)
Dept: PHYSICAL THERAPY | Facility: REHABILITATION | Age: 63
End: 2018-04-24
Payer: MEDICARE

## 2018-04-24 DIAGNOSIS — M54.12 CERVICAL RADICULOPATHY: Primary | ICD-10-CM

## 2018-04-24 PROCEDURE — 97112 NEUROMUSCULAR REEDUCATION: CPT

## 2018-04-24 PROCEDURE — 97140 MANUAL THERAPY 1/> REGIONS: CPT

## 2018-04-24 PROCEDURE — 97110 THERAPEUTIC EXERCISES: CPT

## 2018-04-24 NOTE — PROGRESS NOTES
Daily Note     Today's date: 2018  Patient name: Denver Rios  : 1955  MRN: [id number]  Referring provider: Mimi Bai DO  Dx:   Encounter Diagnosis     ICD-10-CM    1  Cervical radiculopathy M54 12                   Subjective: Patient reports increased right UE discomfort while sleeping last night stating "it feels like when you hit your funny bone, just a constant annoyance " He denies any complaints after previous session  Patient reports compliance with HEP  Objective: See treatment diary below    Precautions: HTN, arthritis   Access code: [email or web address]  * Indicates part of HEP     Daily Treatment Diary     Manual            Bilateral side glides             Prone face cutout R rotation opening 10 min 10 min 10 min          Prone Thoracic P/A mobs grade II   5 min                                        Exercise Diary            Upper trapezius stretch  :05x10 ea :05x10 ea          Levator stretch  :05x10 ea :05x10          3 finger ROM* :05x5 :05x10 ea :05x10 ea          Scapular retraction  2x10 3x10          Sidelying ER  2x10 ea 1# 3x10 ea          TB rows  OTB 2x10 OTB 3x10          TB LPD  OTB 2x10 OTB 3x10          Cervical SNAGs extension, rotation :05x5 ea :05x10 ea :05x20 ea                                                                                                                                                                          Modalities                                                         Assessment: Tolerated treatment well  Patient demonstrated fatigue post treatment, exhibited good technique with therapeutic exercises and would benefit from continued PT Cues required for proper hold time with all stretching, most fatigue noted with sidelying ER no increased discomfort noted with any TE just reports of muscle fatigue  Plan: Continue per plan of care  Progress treatment as tolerated

## 2018-04-27 ENCOUNTER — OFFICE VISIT (OUTPATIENT)
Dept: PHYSICAL THERAPY | Facility: REHABILITATION | Age: 63
End: 2018-04-27
Payer: MEDICARE

## 2018-04-27 DIAGNOSIS — M54.12 CERVICAL RADICULOPATHY: Primary | ICD-10-CM

## 2018-04-27 PROCEDURE — 97140 MANUAL THERAPY 1/> REGIONS: CPT | Performed by: PHYSICAL THERAPIST

## 2018-04-27 PROCEDURE — 97014 ELECTRIC STIMULATION THERAPY: CPT | Performed by: PHYSICAL THERAPIST

## 2018-04-27 NOTE — PROGRESS NOTES
Daily Note     Today's date: 2018  Patient name: Ann Davis  : 1955  MRN: [id number]  Referring provider: Tonie Houston DO  Dx:   Encounter Diagnosis     ICD-10-CM    1  Cervical radiculopathy M54 12                   Subjective: Leroy Gonzalez reports his neck "really hurting" upon arrival to therapy today with patient noting "not sure if I slept on it wrong or did something "       Objective: See treatment diary below  Precautions: HTN, arthritis   Access code: [email or web address]  * Indicates part of HEP     Daily Treatment Diary     Manual           TrP R UT    5 min         Prone face cutout R rotation opening 10 min 10 min 10 min 10 min         Prone Thoracic P/A mobs grade II   5 min 5 min                                       Exercise Diary           Upper trapezius stretch  :05x10 ea :05x10 ea :05x 10         Levator stretch  :05x10 ea :05x10          3 finger ROM* :05x5 :05x10 ea :05x10 ea :05x 10          Scapular retraction  2x10 3x10 2x10         Sidelying ER  2x10 ea 1# 3x10 ea          TB rows  OTB 2x10 OTB 3x10          TB LPD  OTB 2x10 OTB 3x10          Cervical SNAGs extension, rotation :05x5 ea :05x10 ea :05x20 ea                                                                                                                                                                          Modalities              MHP with IFC 10 min                                          Assessment: Tolerated treatment fair  Patient demonstrated fatigue post treatment, exhibited good technique with therapeutic exercises and would benefit from continued PT  Exercises limited as a result of increased soreness and pain  Patient unable to toelrate supine position noting "even if I lay my head flat, it just hurts on that right side " He continues to demonstrate decreased pain with L rotation   Performed MHP and IFC today to finish with patient noting "feeling better than when I came in " Plan: Continue per plan of care  Progress treatment as tolerated

## 2018-05-01 ENCOUNTER — OFFICE VISIT (OUTPATIENT)
Dept: PHYSICAL THERAPY | Facility: REHABILITATION | Age: 63
End: 2018-05-01
Payer: MEDICARE

## 2018-05-01 DIAGNOSIS — M54.12 CERVICAL RADICULOPATHY: Primary | ICD-10-CM

## 2018-05-01 PROCEDURE — 97110 THERAPEUTIC EXERCISES: CPT

## 2018-05-01 PROCEDURE — 97140 MANUAL THERAPY 1/> REGIONS: CPT

## 2018-05-01 PROCEDURE — 97014 ELECTRIC STIMULATION THERAPY: CPT

## 2018-05-01 PROCEDURE — 97112 NEUROMUSCULAR REEDUCATION: CPT

## 2018-05-01 NOTE — PROGRESS NOTES
Daily Note     Today's date: 2018  Patient name: Rei Peer  : 1955  MRN: [id number]  Referring provider: Janet Wallace DO  Dx:   Encounter Diagnosis     ICD-10-CM    1  Cervical radiculopathy M54 12                   Subjective: Patient reports feeling "much better than last week " Patient reports most discomfort while driving stating "I just can't find a comfortable way to drive " He reports compliance with HEP  Objective: See treatment diary below  Precautions: HTN, arthritis   Access code: [email or web address]   * Indicates part of HEP     Daily Treatment Diary     Manual          TrP R UT    5 min 5 min        Prone face cutout R rotation opening 10 min 10 min 10 min 10 min 10 min        Prone Thoracic P/A mobs grade II   5 min 5 min 5 min                                      Exercise Diary          Upper trapezius stretch  :05x10 ea :05x10 ea :05x 10 :05x10 ea        Levator stretch  :05x10 ea :05x10  :05x10 ea        3 finger ROM* :05x5 :05x10 ea :05x10 ea :05x 10  :05x10 ea        Scapular retraction  2x10 3x10 2x10 2x10        Sidelying ER  2x10 ea 1# 3x10 ea  1# 3x10 ea        TB rows  OTB 2x10 OTB 3x10  OTB 3x10        TB LPD  OTB 2x10 OTB 3x10  OTB 3x10        Cervical SNAGs extension, rotation :05x5 ea :05x10 ea :05x20 ea  :05x10 ea                                                                                                                                                                        Modalities             MHP with IFC 10 min 10 min                                         Assessment: Tolerated treatment well  Patient demonstrated fatigue post treatment, exhibited good technique with therapeutic exercises and would benefit from continued PT Patient able to tolerate all manuals and exercise better this session  "Feel better now than before "       Plan: Continue per plan of care  Progress treatment as tolerated

## 2018-05-03 ENCOUNTER — OFFICE VISIT (OUTPATIENT)
Dept: PHYSICAL THERAPY | Facility: REHABILITATION | Age: 63
End: 2018-05-03
Payer: MEDICARE

## 2018-05-03 DIAGNOSIS — M54.12 CERVICAL RADICULOPATHY: Primary | ICD-10-CM

## 2018-05-03 PROCEDURE — 97110 THERAPEUTIC EXERCISES: CPT | Performed by: PHYSICAL THERAPIST

## 2018-05-03 PROCEDURE — 97014 ELECTRIC STIMULATION THERAPY: CPT | Performed by: PHYSICAL THERAPIST

## 2018-05-03 PROCEDURE — 97140 MANUAL THERAPY 1/> REGIONS: CPT | Performed by: PHYSICAL THERAPIST

## 2018-05-03 NOTE — PROGRESS NOTES
Daily Note     Today's date: 5/3/2018  Patient name: Ellen Ayala  : 1955  MRN: [id number]  Referring provider: Alexia Ramos DO  Dx:   Encounter Diagnosis     ICD-10-CM    1  Cervical radiculopathy M54 12                   Subjective: Homero Pickett reports his neck "getting better, last night though I had some spasms in my upper back that woke me up " He reports overall less pain in R UE  Objective: See treatment diary below  Precautions: HTN, arthritis   Access code: [email or web address]   * Indicates part of HEP     Daily Treatment Diary     Manual  4/17 4/20 4/24 4/27 5/1 5/3       TrP R UT    5 min 5 min 5 min       Prone face cutout R rotation opening 10 min 10 min 10 min 10 min 10 min 5 min       Prone Thoracic P/A mobs grade II   5 min 5 min 5 min 5 min       R Radial nerve glides      10                        Exercise Diary  4/17 4/20 4/24 4/27 5/1 5/3       Upper trapezius stretch  :05x10 ea :05x10 ea :05x 10 :05x10 ea :05x10 ea       Levator stretch  :05x10 ea :05x10  :05x10 ea :05x10 ea       3 finger ROM* :05x5 :05x10 ea :05x10 ea :05x 10  :05x10 ea :05x10 ea       Scapular retraction  2x10 3x10 2x10 2x10 2x10       Sidelying ER  2x10 ea 1# 3x10 ea  1# 3x10 ea 1# 3x10 ea       TB rows  OTB 2x10 OTB 3x10  OTB 3x10 OTB 3x10       TB LPD  OTB 2x10 OTB 3x10  OTB 3x10 OTB 3x10       Cervical SNAGs extension, rotation :05x5 ea :05x10 ea :05x20 ea  :05x10 ea :05x 10        Prone I face cutout      10                                                                                                                                                           Modalities  4/27 5/1 5/3          MHP with IFC 10 min 10 min 10 min                                        Assessment: Tolerated treatment fair  Patient demonstrated fatigue post treatment, exhibited good technique with therapeutic exercises and would benefit from continued PT  Patient continues to demonstrate L Lateral flexion posture when laying supine   Trialed R radial nerve glides in supine with fair tolerance  Added chin tucks and prone I with patient noting discomfort with new additions, only 10 repetitions performed as a result  Plan: Continue per plan of care  Progress treatment as tolerated

## 2018-05-04 ENCOUNTER — APPOINTMENT (OUTPATIENT)
Dept: PHYSICAL THERAPY | Facility: REHABILITATION | Age: 63
End: 2018-05-04
Payer: MEDICARE

## 2018-05-08 ENCOUNTER — OFFICE VISIT (OUTPATIENT)
Dept: PHYSICAL THERAPY | Facility: REHABILITATION | Age: 63
End: 2018-05-08
Payer: MEDICARE

## 2018-05-08 DIAGNOSIS — M54.12 CERVICAL RADICULOPATHY: Primary | ICD-10-CM

## 2018-05-08 PROCEDURE — 97014 ELECTRIC STIMULATION THERAPY: CPT

## 2018-05-08 PROCEDURE — 97140 MANUAL THERAPY 1/> REGIONS: CPT

## 2018-05-08 PROCEDURE — 97110 THERAPEUTIC EXERCISES: CPT

## 2018-05-08 NOTE — PROGRESS NOTES
Daily Note     Today's date: 2018  Patient name: Viki Luke  : 1955  MRN: [id number]  Referring provider: Antonio Morton DO  Dx:   Encounter Diagnosis     ICD-10-CM    1  Cervical radiculopathy M54 12                   Subjective: Patient reports no change in pain level prior to session today stating "it's not better, it's not worse, the arm is still always a pain " He reports increased discomfort levels after previous session  Objective: See treatment diary below  Precautions: HTN, arthritis   Access code: [email or web address]   * Indicates part of HEP     Daily Treatment Diary     Manual  4/17 4/20 4/24 4/27 5/1 5/3 5/7      TrP R UT    5 min 5 min 5 min 5 min      Prone face cutout R rotation opening 10 min 10 min 10 min 10 min 10 min 5 min 5 min      Prone Thoracic P/A mobs grade II   5 min 5 min 5 min 5 min 5 min      R Radial nerve glides      10 5 min                       Exercise Diary  4/17 4/20 4/24 4/27 5/1 5/3 5/8      Upper trapezius stretch  :05x10 ea :05x10 ea :05x 10 :05x10 ea :05x10 ea :05x10 ea      Levator stretch  :05x10 ea :05x10  :05x10 ea :05x10 ea :05x10 ea      3 finger ROM* :05x5 :05x10 ea :05x10 ea :05x 10  :05x10 ea :05x10 ea :05x10 ea      Scapular retraction  2x10 3x10 2x10 2x10 2x10 2x10      Sidelying ER  2x10 ea 1# 3x10 ea  1# 3x10 ea 1# 3x10 ea 1# 3x10      TB rows  OTB 2x10 OTB 3x10  OTB 3x10 OTB 3x10 OTB 3x10      TB LPD  OTB 2x10 OTB 3x10  OTB 3x10 OTB 3x10 OTB 3x10      Cervical SNAGs extension, rotation :05x5 ea :05x10 ea :05x20 ea  :05x10 ea :05x 10  :05x10       Prone I face cutout      10  10                                                                                                                                                         Modalities  4/27 5/1 5/3 5/8         MHP with IFC 10 min 10 min 10 min 10 min                                         Assessment: Tolerated treatment well   Patient demonstrated fatigue post treatment and would benefit from continued PT Patient reports increased discomfort with completion of prone I  Cues continued to be required for proper stretching  Plan: Continue per plan of care  Progress treatment as tolerated

## 2018-05-11 ENCOUNTER — OFFICE VISIT (OUTPATIENT)
Dept: PHYSICAL THERAPY | Facility: REHABILITATION | Age: 63
End: 2018-05-11
Payer: MEDICARE

## 2018-05-11 DIAGNOSIS — M54.12 CERVICAL RADICULOPATHY: Primary | ICD-10-CM

## 2018-05-11 PROCEDURE — 97110 THERAPEUTIC EXERCISES: CPT

## 2018-05-11 PROCEDURE — 97140 MANUAL THERAPY 1/> REGIONS: CPT

## 2018-05-11 PROCEDURE — 97014 ELECTRIC STIMULATION THERAPY: CPT

## 2018-05-11 NOTE — PROGRESS NOTES
Daily Note     Today's date: 2018  Patient name: Luz Marina Walker  : 1955  MRN: [id number]  Referring provider: Berto Parker DO  Dx:   Encounter Diagnosis     ICD-10-CM    1  Cervical radiculopathy M54 12                   Subjective: Patient reports driving for extended period of time yesterday where he reports increased neck and right upper extremity pain   Patient reports discomfort as "no better, no worse "  He reports no complaints after previous session stating "felt okay actually "        Objective: See treatment diary below  Precautions: HTN, arthritis   Access code: [email or web address]   * Indicates part of HEP     Daily Treatment Diary     Manual  4/17 4/20 4/24 4/27 5/1 5/3 5/7 5/11     TrP R UT    5 min 5 min 5 min 5 min 5 min     Prone face cutout R rotation opening 10 min 10 min 10 min 10 min 10 min 5 min 5 min 5 min     Prone Thoracic P/A mobs grade II   5 min 5 min 5 min 5 min 5 min 5 min     R Radial nerve glides      10 5 min                       Exercise Diary  4/17 4/20 4/24 4/27 5/1 5/3 5/8 5/11     Upper trapezius stretch  :05x10 ea :05x10 ea :05x 10 :05x10 ea :05x10 ea :05x10 ea :05x10 ea     Levator stretch  :05x10 ea :05x10  :05x10 ea :05x10 ea :05x10 ea :05x10 ea     3 finger ROM* :05x5 :05x10 ea :05x10 ea :05x 10  :05x10 ea :05x10 ea :05x10 ea :05x10 ea     Scapular retraction  2x10 3x10 2x10 2x10 2x10 2x10 2x10     Sidelying ER  2x10 ea 1# 3x10 ea  1# 3x10 ea 1# 3x10 ea 1# 3x10 1# 3x10     TB rows  OTB 2x10 OTB 3x10  OTB 3x10 OTB 3x10 OTB 3x10 GTB 3x10     TB LPD  OTB 2x10 OTB 3x10  OTB 3x10 OTB 3x10 OTB 3x10 GTB 3x10     Cervical SNAGs extension, rotation :05x5 ea :05x10 ea :05x20 ea  :05x10 ea :05x 10  :05x10  :05x10      Prone I face cutout      10  10 10                                                                                                                                                        Modalities   5/ 5/3 5/ 5        MHP with IFC 10 min 10 min 10 min 10 min 10 min                                        Assessment: Tolerated treatment well  Patient demonstrated fatigue post treatment, exhibited good technique with therapeutic exercises and would benefit from continued PT Cues required for proper hold time with all stretching  Plan: Continue per plan of care  Progress treatment as tolerated

## 2018-05-14 ENCOUNTER — OFFICE VISIT (OUTPATIENT)
Dept: PHYSICAL THERAPY | Facility: REHABILITATION | Age: 63
End: 2018-05-14
Payer: MEDICARE

## 2018-05-14 DIAGNOSIS — M54.12 CERVICAL RADICULOPATHY: Primary | ICD-10-CM

## 2018-05-14 PROCEDURE — 97140 MANUAL THERAPY 1/> REGIONS: CPT | Performed by: PHYSICAL THERAPIST

## 2018-05-14 PROCEDURE — 97014 ELECTRIC STIMULATION THERAPY: CPT | Performed by: PHYSICAL THERAPIST

## 2018-05-14 PROCEDURE — 97110 THERAPEUTIC EXERCISES: CPT | Performed by: PHYSICAL THERAPIST

## 2018-05-14 PROCEDURE — G8985 CARRY GOAL STATUS: HCPCS | Performed by: PHYSICAL THERAPIST

## 2018-05-14 PROCEDURE — G8984 CARRY CURRENT STATUS: HCPCS | Performed by: PHYSICAL THERAPIST

## 2018-05-14 NOTE — PROGRESS NOTES
PT Re-Evaluation     Today's date: 2018  Patient name: Zander Ramon  : 1955  MRN: [id number]  Referring provider: Angela Mccrary DO  Dx:   Encounter Diagnosis     ICD-10-CM    1  Cervical radiculopathy M54 12                   Assessment  Impairments: abnormal muscle tone, abnormal or restricted ROM, activity intolerance, impaired physical strength, lacks appropriate home exercise program and pain with function  Functional limitations: Patient reports to evaluation with Radiculopathy, cervical region  (primary encounter diagnosis) with the following functional impairments: reaching, lifting, driving, lateral rotation of the head, extension, sleeping at night  Assessment details: Zander Ramon is a 70y o  year old male who presents to [facility] with:   Radiculopathy, cervical region  (primary encounter diagnosis)     Frantz Murillo has made the following improvements since beginning PT: decreased pain, increased ROM, increased strength and increased tolerance to activities    Frantz Murillo continues to present with the impairments as listed above  Patient would continue to benefit from skilled PT to address these deficits and to maximize function  Understanding of Dx/Px/POC: good   Prognosis: good  Prognosis details: Frantz Murillo prognosis may be affected by the following: HTN, arthritis    Goals  Short-Term Goals:   1  Patient will increased ROM by 10 degrees in 4 weeks- Met  2  Patient will report pain less than 3/10 in 4 weeks  - Partially Met    Long-Term Goals:   1  Patient will demonstrate symmetrical cervical lateral rotation at time of discharge  - Partially Met  2  Patient independent with HEP at time of discharge  - Partially met  3  Patient will be able to lift and perform IADL with R UE with minimal to no pain at time of discharge   - Partially Met    Plan  Patient would benefit from: skilled PT  Planned modality interventions: thermotherapy: hydrocollator packs and unattended electrical stimulation  Planned therapy interventions: therapeutic exercise, therapeutic activities, stretching, strengthening, postural training, patient education, home exercise program, IADL retraining, joint mobilization, manual therapy and neuromuscular re-education  Frequency: 2x week  Duration in visits: 12  Duration in weeks: 6  Treatment plan discussed with: patient  Plan details: Thank you for referring Rei Frances to Physical Therapy at [place] [address] and for the opportunity to coordinate care  Subjective Evaluation    History of Present Illness  Mechanism of injury: Bhavani Stanley has been seen for total of 9 visits for OP PT for cervical radiculopathy  Patient rates overall improvement since beginning PT 30%  Patient's global rating of change is " Somewhat better (3) " Patient reports improvements with motion, noting "feels like movements are normalizing " Patient reports most difficulty with lateral rotation to the right and extension   He reports pain in R UE "about the same, feels a little less, but I still feel it the most when I'm driving "          Quality of life: fair    Pain  Current pain ratin  At best pain ratin  At worst pain ratin  Location: Cervical spine and R UE   Quality: throbbing  Aggravating factors: lifting and overhead activity  Progression: no change    Hand dominance: right      Diagnostic Tests  No diagnostic tests performed  Treatments  Previous treatment: physical therapy  Current treatment: physical therapy  Patient Goals  Patient goals for therapy: decreased pain, increased motion, increased strength and independence with ADLs/IADLs  Patient goal: "get it to the point where I"m not feeling this constant throbbing thing in my right arm and shoulder "         Objective     Special Questions  Negative for dizziness, faints, headaches, trouble swallowing, difficulty breathing and visual change    Postural Observations  Seated posture: fair  Standing posture: fair    Additional Postural Observation Details  Patient demonstrates increased thoracic kyphosis and cervical protrusion, rounded shoulders  Palpation     Right   Hypertonic in the upper trapezius  Tenderness of the cervical interspinals and upper trapezius  Trigger point to upper trapezius  Additional Palpation Details  Tenderness along C5-T3 R interspinal musculature  Neurological Testing     Sensation   Cervical/Thoracic   Left   Intact: light touch    Right   Intact: light touch    Reflexes   Left   Biceps (C5/C6): normal (2+)  Brachioradialis (C6): normal (2+)  Triceps (C7): normal (2+)    Right   Biceps (C5/C6): normal (2+)  Brachioradialis (C6): normal (2+)  Triceps (C7): normal (2+)    Additional Neurological Details  Patient denies N/T in R UE at this time         Active Range of Motion   Cervical/Thoracic Spine   Cervical    Flexion: 50 degrees   Extension: 30 degrees with pain  Left lateral flexion: 30 degrees   Right lateral flexion: 25 degrees with pain  Left rotation: 80 degrees   Right rotation: 70 degrees with pain    Joint Play Hypomobile: C5, C6, C7, T1, T2 and T3 Pain: C5 and C6     Strength/Myotome Testing   Cervical Spine     Left   Interossei strength (t1): 4  Neck lateral flexion (C3): WFL    Right   Interossei strength (t1): 4etr  Neck lateral flexion (C3): WFL    Left Shoulder     Planes of Motion   Flexion: 5   Extension: 5 and WFL   Abduction: 5 and WFL   Adduction: WFL   External rotation at 0°: 5   Internal rotation at 0°: 5     Right Shoulder     Planes of Motion   Flexion: 4   Extension: 4 and WFL   Abduction: 4 and WFL   Adduction: WFL   External rotation at 0°: 4   Internal rotation at 0°: 4     Left Elbow   Flexion: 5 and WFL  Extension: 5    Right Elbow   Flexion: 5 and WFL  Extension: 5    Left Wrist/Hand   Wrist extension: 5 and WFL  Wrist flexion: 5 and WFL  Radial deviation: WFL    Right Wrist/Hand   Wrist extension: 5 and WFL  Wrist flexion: 5 and WFL  [facility]    Tests   Cervical     Left Negative alar ligament integrity, cervical distraction, Spurling's sign and cervical compression test       Right   Positive Spurling's sign     Negative alar ligament integrity, cervical distraction and cervical compression test             Precautions: HTN, arthritis   Access code: [email or web address]   * Indicates part of HEP     Daily Treatment Diary     Manual  4/17 4/20 4/24 4/27 5/1 5/3 5/7 5/11 5/14    TrP R UT    5 min 5 min 5 min 5 min 5 min 5 min    Prone face cutout R rotation opening 10 min 10 min 10 min 10 min 10 min 5 min 5 min 5 min 5 min    Prone Thoracic P/A mobs grade II   5 min 5 min 5 min 5 min 5 min 5 min 5 min    R Radial nerve glides      10 5 min  3 min                     Exercise Diary  4/17 4/20 4/24 4/27 5/1 5/3 5/8 5/11 5/14    Upper trapezius stretch  :05x10 ea :05x10 ea :05x 10 :05x10 ea :05x10 ea :05x10 ea :05x10 ea :05x 10 ea    Levator stretch  :05x10 ea :05x10  :05x10 ea :05x10 ea :05x10 ea :05x10 ea :05x 10 ea    3 finger ROM* :05x5 :05x10 ea :05x10 ea :05x 10  :05x10 ea :05x10 ea :05x10 ea :05x10 ea :05x 10 ea    Scapular retraction  2x10 3x10 2x10 2x10 2x10 2x10 2x10 20    Sidelying ER  2x10 ea 1# 3x10 ea  1# 3x10 ea 1# 3x10 ea 1# 3x10 1# 3x10 NP today    TB rows  OTB 2x10 OTB 3x10  OTB 3x10 OTB 3x10 OTB 3x10 GTB 3x10 GTB 3x10    TB LPD  OTB 2x10 OTB 3x10  OTB 3x10 OTB 3x10 OTB 3x10 GTB 3x10 GTB 3x10    Cervical SNAGs extension, rotation :05x5 ea :05x10 ea :05x20 ea  :05x10 ea :05x 10  :05x10  :05x10  NP today    Prone I face cutout      10  10 10 10    Cat/camel          10 ea    R radial nerve glides         10 ea                                                                                                                             Modalities  4/27 5/1 5/3 5/8 5/11 5/14       MHP with IFC 10 min 10 min 10 min 10 min 10 min 10 min

## 2018-05-17 ENCOUNTER — OFFICE VISIT (OUTPATIENT)
Dept: PHYSICAL THERAPY | Facility: REHABILITATION | Age: 63
End: 2018-05-17
Payer: MEDICARE

## 2018-05-17 DIAGNOSIS — M54.12 CERVICAL RADICULOPATHY: Primary | ICD-10-CM

## 2018-05-17 PROCEDURE — 97140 MANUAL THERAPY 1/> REGIONS: CPT | Performed by: PHYSICAL THERAPIST

## 2018-05-17 PROCEDURE — 97014 ELECTRIC STIMULATION THERAPY: CPT | Performed by: PHYSICAL THERAPIST

## 2018-05-17 PROCEDURE — 97110 THERAPEUTIC EXERCISES: CPT | Performed by: PHYSICAL THERAPIST

## 2018-05-17 NOTE — PROGRESS NOTES
Daily Note     Today's date: 2018  Patient name: Leonor Bautista  : 1955  MRN: [id number]  Referring provider: Carlos Sanchez DO  Dx:   Encounter Diagnosis     ICD-10-CM    1  Cervical radiculopathy M54 12        Start Time: 945  Stop Time:   Total time in clinic (min): 50 minutes    Subjective: Valerie Ortega reports that his neck and R upper extremity continues to feel the same, notes when he's on the medication it feels better, "but then it wears off"  Objective: See treatment diary below      Assessment: Tolerated treatment well  Patient demonstrated fatigue post treatment, exhibited good technique with therapeutic exercises and would benefit from continued PT  Pt had pain with any R rotation directed manuals from T3 up  Able to achieve some cavitation with grade 4 force, no thrust applied  Continues to not be able to tolerate any form of right rotation  Plan: Continue per plan of care     Precautions: HTN, arthritis   Access code: [email or web address]   * Indicates part of HEP     Daily Treatment Diary     Manual  4/17 4/20 4/24 4/27 5/1 5/3 5/7 5/11 5/14 5/17   TrP R UT    5 min 5 min 5 min 5 min 5 min 5 min np   Prone face cutout R rotation opening 10 min 10 min 10 min 10 min 10 min 5 min 5 min 5 min 5 min 5 min   Prone Thoracic P/A mobs grade II   5 min 5 min 5 min 5 min 5 min 5 min 5 min 5min grade 3-4   R Radial nerve glides      10 5 min  3 min                     Exercise Diary  4/17 4/20 4/24 4/27 5/1 5/3 5/8 5/11 5/14 5/17   Upper trapezius stretch  :05x10 ea :05x10 ea :05x 10 :05x10 ea :05x10 ea :05x10 ea :05x10 ea :05x 10 ea :[id number]   Levator stretch  :05x10 ea :05x10  :05x10 ea :05x10 ea :05x10 ea :05x10 ea :05x 10 ea :[id number]   3 finger ROM* :05x5 :05x10 ea :05x10 ea :05x 10  :05x10 ea :05x10 ea :05x10 ea :05x10 ea :05x 10 ea :05 x 10ea   Scapular retraction  2x10 3x10 2x10 2x10 2x10 2x10 2x10 20    Sidelying ER  2x10 ea 1# 3x10 ea  1# 3x10 ea 1# 3x10 ea 1# 3x10 1# 3x10 NP today 1# 3x15   TB rows  OTB 2x10 OTB 3x10  OTB 3x10 OTB 3x10 OTB 3x10 GTB 3x10 GTB 3x10 GTB 3x12   TB LPD  OTB 2x10 OTB 3x10  OTB 3x10 OTB 3x10 OTB 3x10 GTB 3x10 GTB 3x10 GTB 3x12   Cervical SNAGs extension, rotation :05x5 ea :05x10 ea :05x20 ea  :05x10 ea :05x 10  :05x10  :05x10  NP today    Prone I face cutout      10  10 10 10 2x5   Cat/camel          10 ea 10ea x5"   R radial nerve glides         10 ea np                                                                                                                            Modalities  4/27 5/1 5/3 5/8 5/11 5/14 5/17      MHP with IFC 10 min 10 min 10 min 10 min 10 min 10 min 10 min

## 2018-05-20 DIAGNOSIS — I10 ESSENTIAL HYPERTENSION: Primary | ICD-10-CM

## 2018-05-21 RX ORDER — LISINOPRIL AND HYDROCHLOROTHIAZIDE 20; 12.5 MG/1; MG/1
TABLET ORAL
Qty: 180 TABLET | Refills: 1 | Status: SHIPPED | OUTPATIENT
Start: 2018-05-21 | End: 2018-12-07 | Stop reason: SDUPTHER

## 2018-05-22 ENCOUNTER — OFFICE VISIT (OUTPATIENT)
Dept: PHYSICAL THERAPY | Facility: REHABILITATION | Age: 63
End: 2018-05-22
Payer: MEDICARE

## 2018-05-22 DIAGNOSIS — M54.12 CERVICAL RADICULOPATHY: Primary | ICD-10-CM

## 2018-05-22 PROCEDURE — 97112 NEUROMUSCULAR REEDUCATION: CPT | Performed by: PHYSICAL THERAPIST

## 2018-05-22 PROCEDURE — 97140 MANUAL THERAPY 1/> REGIONS: CPT | Performed by: PHYSICAL THERAPIST

## 2018-05-22 PROCEDURE — 97110 THERAPEUTIC EXERCISES: CPT | Performed by: PHYSICAL THERAPIST

## 2018-05-25 ENCOUNTER — OFFICE VISIT (OUTPATIENT)
Dept: PHYSICAL THERAPY | Facility: REHABILITATION | Age: 63
End: 2018-05-25
Payer: MEDICARE

## 2018-05-25 DIAGNOSIS — M54.12 CERVICAL RADICULOPATHY: Primary | ICD-10-CM

## 2018-05-25 PROCEDURE — 97110 THERAPEUTIC EXERCISES: CPT

## 2018-05-25 PROCEDURE — 97112 NEUROMUSCULAR REEDUCATION: CPT

## 2018-05-25 PROCEDURE — 97140 MANUAL THERAPY 1/> REGIONS: CPT

## 2018-05-25 NOTE — PROGRESS NOTES
Daily Note     Today's date: 2018  Patient name: Karoline Hernandez  : 1955  MRN: [id number]  Referring provider: Marco A Haines DO  Dx:   Encounter Diagnosis     ICD-10-CM    1  Cervical radiculopathy M54 12                   Subjective: Libby Thibodeaux reports that his neck is doing ok today having minimal pain  Notes that the R arm is still painful at times but is tolerable  Also notes that he is compliant with his HEP performing them daily  Objective: See treatment diary below      Assessment: Tolerated treatment well having no increased pain or other symptoms  Patient demonstrated fatigue post treatment, exhibited good technique with therapeutic exercises and would benefit from continued PT  Plan: Continue per plan of care     Precautions: HTN, arthritis   Access code: [email or web address]   * Indicates part of HEP     Daily Treatment Diary     Manual  5/22 5/25 4/24 4/27 5/1 5/3 5/7 5/11 5/14 5/17   TrP R UT    5 min 5 min 5 min 5 min 5 min 5 min np   Prone face cutout R rotation opening   10 min 10 min 10 min 5 min 5 min 5 min 5 min 5 min   Prone Thoracic P/A mobs grade II 10 min grade 3-5 4 min LX 5 min 5 min 5 min 5 min 5 min 5 min 5 min 5min grade 3-4   R Radial nerve glides      10 5 min  3 min    prone CS MWM R rotation overpressure with pillow under chest 5 min 4 min LX               Exercise Diary   5/3 5/8 5/11 5/14 5/17   Upper trapezius stretch :55x43lk "05x10 ea :05x10 ea :05x 10 :05x10 ea :05x10 ea :05x10 ea :05x10 ea :05x 10 ea :[id number]   Levator stretch :98z68pi "05x10 ea :05x10  :05x10 ea :05x10 ea :05x10 ea :05x10 ea :05x 10 ea :32m78fm   3 finger ROM* :05x10 ea :05x10 ea :05x10 ea :05x 10  :05x10 ea :05x10 ea :05x10 ea :05x10 ea :05x 10 ea :05 x 10ea   Scapular retraction 20 20 3x10 2x10 2x10 2x10 2x10 2x10 20    Sidelying ER 2# 3x10 ea 2# 3x10 ea 1# 3x10 ea  1# 3x10 ea 1# 3x10 ea 1# 3x10 1# 3x10 NP today 1# 3x15   TB rows GTB 3x12 GTB 3x12 OTB 3x10  OTB 3x10 OTB 3x10 OTB 3x10 GTB 3x10 GTB 3x10 GTB 3x12   TB LPD GTB 3x12 GTB 3x12 OTB 3x10  OTB 3x10 OTB 3x10 OTB 3x10 GTB 3x10 GTB 3x10 GTB 3x12   Cervical SNAGs extension, rotation   :05x20 ea  :05x10 ea :05x 10  :05x10  :05x10  NP today    Prone I face cutout      10  10 10 10 2x5   Prone pillow under chest, bilateral I w CS retraction 5x3"x3 5x3"x3           Cat/camel          10 ea 10ea x5"   R radial nerve glides         10 ea np                                                                                                                            Modalities  4/27 5/1 5/3 5/8 5/11 5/14 5/17 5/25     MHP with IFC 10 min 10 min 10 min 10 min 10 min 10 min 10 min 10 min

## 2018-05-29 ENCOUNTER — OFFICE VISIT (OUTPATIENT)
Dept: PHYSICAL THERAPY | Facility: REHABILITATION | Age: 63
End: 2018-05-29
Payer: MEDICARE

## 2018-05-29 DIAGNOSIS — M54.12 CERVICAL RADICULOPATHY: Primary | ICD-10-CM

## 2018-05-29 PROCEDURE — 97140 MANUAL THERAPY 1/> REGIONS: CPT

## 2018-05-29 PROCEDURE — 97110 THERAPEUTIC EXERCISES: CPT

## 2018-05-29 PROCEDURE — 97112 NEUROMUSCULAR REEDUCATION: CPT

## 2018-05-29 NOTE — PROGRESS NOTES
Daily Note     Today's date: 2018  Patient name: Violetta Dubois  : 1955  MRN: [id number]  Referring provider: Sue Cervantes DO  Dx:   Encounter Diagnosis     ICD-10-CM    1  Cervical radiculopathy M54 12                   Subjective: Jose Ruelas reports that his neck has really been bothering him over the weekend and he had to take muscle relaxer  Notes that they really have helped him with the pain  Objective: See treatment diary below      Assessment: Tolerated treatment well having no increased pain or other symptoms  Patient demonstrated fatigue post treatment, exhibited good technique with therapeutic exercises and would benefit from continued PT  Plan: Continue per plan of care     Precautions: HTN, arthritis   Access code: [email or web address]   * Indicates part of HEP     Daily Treatment Diary     Manual  5/22 5/25 5/29 4/27 5/1 5/3 5/7 5/11 5/14 5/17   TrP R UT   4 min 5 min 5 min 5 min 5 min 5 min 5 min np   Prone face cutout R rotation opening    10 min 10 min 5 min 5 min 5 min 5 min 5 min   Prone Thoracic P/A mobs grade II 10 min grade 3-5 4 min LX 4 min LX 5 min 5 min 5 min 5 min 5 min 5 min 5min grade 3-4   R Radial nerve glides      10 5 min  3 min    prone CS MWM R rotation overpressure with pillow under chest 5 min 4 min LX 4 min LX              Exercise Diary   5/3 5/8 5/11 5/14 5/17   Upper trapezius stretch :13v37ed "05x10 ea :05x10 ea :05x 10 :05x10 ea :05x10 ea :05x10 ea :05x10 ea :05x 10 ea :63l72lv   Levator stretch :13r04ni "05x10 ea :05x10  :05x10 ea :05x10 ea :05x10 ea :05x10 ea :05x 10 ea :34y93bq   3 finger ROM* :05x10 ea :05x10 ea :05x10 ea :05x 10  :05x10 ea :05x10 ea :05x10 ea :05x10 ea :05x 10 ea :05 x 10ea   Scapular retraction 20 20 20 2x10 2x10 2x10 2x10 2x10 20    Sidelying ER 2# 3x10 ea 2# 3x10 ea 2# 3x10 ea  1# 3x10 ea 1# 3x10 ea 1# 3x10 1# 3x10 NP today 1# 3x15   TB rows GTB 3x12 GTB 3x12 GTB 3x12  OTB 3x10 OTB 3x10 OTB 3x10 GTB 3x10 GTB 3x10 GTB 3x12 TB LPD GTB 3x12 GTB 3x12 GTB 3x10  OTB 3x10 OTB 3x10 OTB 3x10 GTB 3x10 GTB 3x10 GTB 3x12   Cervical SNAGs extension, rotation     :05x10 ea :05x 10  :05x10  :05x10  NP today    Prone I face cutout      10  10 10 10 2x5   Prone pillow under chest, bilateral I w CS retraction 5x3"x3 5x3"x3 5x3"x3          Cat/camel          10 ea 10ea x5"   R radial nerve glides         10 ea np                                                                                                                            Modalities  4/27 5/1 5/3 5/8 5/11 5/14 5/17 5/25 5/29    MHP with IFC 10 min 10 min 10 min 10 min 10 min 10 min 10 min 10 min 10 min

## 2018-06-01 ENCOUNTER — OFFICE VISIT (OUTPATIENT)
Dept: PHYSICAL THERAPY | Facility: REHABILITATION | Age: 63
End: 2018-06-01
Payer: MEDICARE

## 2018-06-01 DIAGNOSIS — M54.12 CERVICAL RADICULOPATHY: Primary | ICD-10-CM

## 2018-06-01 PROCEDURE — 97110 THERAPEUTIC EXERCISES: CPT | Performed by: PHYSICAL THERAPIST

## 2018-06-01 PROCEDURE — 97112 NEUROMUSCULAR REEDUCATION: CPT | Performed by: PHYSICAL THERAPIST

## 2018-06-01 PROCEDURE — 97140 MANUAL THERAPY 1/> REGIONS: CPT | Performed by: PHYSICAL THERAPIST

## 2018-06-01 NOTE — PROGRESS NOTES
Daily Note     Today's date: 2018  Patient name: Karoline Hernandez  : 1955  MRN: [id number]  Referring provider: Marco A Haines DO  Dx:   Encounter Diagnosis     ICD-10-CM    1  Cervical radiculopathy M54 12        Start Time: 1000  Stop Time: 1042  Total time in clinic (min): 42 minutes    Subjective: Libby Thibodeaux reports his neck "been feeling pretty good today   I've been taking the muscle relaxers and I can almost lay down flat now, still have to prop myself up with pillows "         Objective: See treatment diary below  Precautions: HTN, arthritis   Access code: [email or web address]   * Indicates part of HEP     Daily Treatment Diary     Manual  5/22 5/25 5/29 6/1 5/1 5/3 5/7 5/11 5/14 5/17   TrP R UT   4 min  5 min 5 min 5 min 5 min 5 min np   Prone face cutout R rotation opening     10 min 5 min 5 min 5 min 5 min 5 min   Prone Thoracic P/A mobs grade II 10 min grade 3-5 4 min LX 4 min LX 5 min 5 min 5 min 5 min 5 min 5 min 5min grade 3-4   R Radial nerve glides      10 5 min  3 min    prone CS MWM R rotation overpressure with pillow under chest 5 min 4 min LX 4 min LX supine 10 min             Exercise Diary  5/22 5/25 5/29 6/1  5/3 5/8 5/11 5/14 5/17   Upper trapezius stretch :28x23nd "05x10 ea :05x10 ea :05x 10 ea  :05x10 ea :05x10 ea :05x10 ea :05x 10 ea :50d85tj   Levator stretch :82o83am "05x10 ea :05x10 :05x 10 ea  :05x10 ea :05x10 ea :05x10 ea :05x 10 ea :87q30ej   3 finger ROM* :05x10 ea :05x10 ea :05x10 ea :05x 10   :05x10 ea :05x10 ea :05x10 ea :05x 10 ea :05 x 10ea   Scapular retraction 20 20 20 OTB w/ ER 2x10  2x10 2x10 2x10 20    Sidelying ER 2# 3x10 ea 2# 3x10 ea 2# 3x10 ea NP today  1# 3x10 ea 1# 3x10 1# 3x10 NP today 1# 3x15   TB rows GTB 3x12 GTB 3x12 GTB 3x12 GTB 3x12  OTB 3x10 OTB 3x10 GTB 3x10 GTB 3x10 GTB 3x12   TB LPD GTB 3x12 GTB 3x12 GTB 3x10 GTB 3x12  OTB 3x10 OTB 3x10 GTB 3x10 GTB 3x10 GTB 3x12   Cervical SNAGs extension, rotation      :05x 10  :05x10  :05x10  NP today    Prone pillow under chest, bilateral I w CS retraction 5x3"x3 5x3"x3 5x3"x3 5x3"x3         Cat/camel          10 ea 10ea x5"   R radial nerve glides    10     10 ea np   TB horizontal abduction    PTB 2x10                                                                                                                     Modalities  4/27 5/1 5/3 5/8 5/11 5/14 5/17 5/25 5/29    MHP with IFC 10 min 10 min 10 min 10 min 10 min 10 min 10 min 10 min 10 min                                  Assessment: Tolerated treatment fair  Patient demonstrated fatigue post treatment, exhibited good technique with therapeutic exercises and would benefit from continued PT  Patient demonstrating less tightness with all manuals performed today and improved tolerance with laying supine  Added PTB horizontal abduction with fair tolerance, patient reporting minimal soreness in R UE  Will continue to progress patient as able next visit  Plan: Continue per plan of care  Progress treatment as tolerated

## 2018-06-05 ENCOUNTER — OFFICE VISIT (OUTPATIENT)
Dept: PHYSICAL THERAPY | Facility: REHABILITATION | Age: 63
End: 2018-06-05
Payer: MEDICARE

## 2018-06-05 DIAGNOSIS — M54.12 CERVICAL RADICULOPATHY: Primary | ICD-10-CM

## 2018-06-05 PROCEDURE — 97140 MANUAL THERAPY 1/> REGIONS: CPT

## 2018-06-05 PROCEDURE — 97112 NEUROMUSCULAR REEDUCATION: CPT

## 2018-06-05 PROCEDURE — 97110 THERAPEUTIC EXERCISES: CPT

## 2018-06-05 NOTE — PROGRESS NOTES
Daily Note     Today's date: 2018  Patient name: Sandra Lee  : 1955  MRN: [id number]  Referring provider: Jame Natarajan DO  Dx:   Encounter Diagnosis     ICD-10-CM    1  Cervical radiculopathy M54 12                   Subjective: Patient reports feeling "much better" stating "I started taking muscle relaxers last week and it's crazy how fast they work " Patient reports wanting Friday to be his last appointment, patient to transition to HEP  Re-evaluate patient next visit         Objective: See treatment diary below  Precautions: HTN, arthritis   Access code: [email or web address]   * Indicates part of HEP     Daily Treatment Diary     Manual     TrP R UT   4 min    5 min 5 min 5 min np   Prone face cutout R rotation opening     3 min  5 min 5 min 5 min 5 min   Prone Thoracic P/A mobs grade II 10 min grade 3-5 4 min LX 4 min LX 5 min 5 min  5 min 5 min 5 min 5min grade 3-4   R Radial nerve glides       5 min  3 min    prone CS MWM R rotation overpressure with pillow under chest 5 min 4 min LX 4 min LX supine 10 min             Exercise Diary     Upper trapezius stretch :89x08kt "05x10 ea :05x10 ea :05x 10 ea :05x10 ea  :05x10 ea :05x10 ea :05x 10 ea :44c92vr   Levator stretch :36x65zg "05x10 ea :05x10 :05x 10 ea :05x10 ea  :05x10 ea :05x10 ea :05x 10 ea :27n32rw   3 finger ROM* :05x10 ea :05x10 ea :05x10 ea :05x 10  :05x10   :05x10 ea :05x10 ea :05x 10 ea :05 x 10ea   Scapular retraction 20 20 20 OTB w/ ER 2x10 OTB w/ER 3x10  2x10 2x10 20    Sidelying ER 2# 3x10 ea 2# 3x10 ea 2# 3x10 ea NP today   1# 3x10 1# 3x10 NP today 1# 3x15   TB rows GTB 3x12 GTB 3x12 GTB 3x12 GTB 3x12 GTB 3x15  OTB 3x10 GTB 3x10 GTB 3x10 GTB 3x12   TB LPD GTB 3x12 GTB 3x12 GTB 3x10 GTB 3x12 GTB 3x15  OTB 3x10 GTB 3x10 GTB 3x10 GTB 3x12   Cervical SNAGs extension, rotation       :05x10  :05x10  NP today    Prone pillow under chest, bilateral I w CS retraction 5x3"x3 5x3"x3 5x3"x3 5x3"x3 5x3''x3        Cat/camel          10 ea 10ea x5"   R radial nerve glides    10 10    10 ea np   TB horizontal abduction    PTB 2x10 PTB 2x10                                                                                                                    Modalities  4/27 5/1 5/3 5/8 5/11 5/14 5/17 5/25 5/29    MHP with IFC 10 min 10 min 10 min 10 min 10 min 10 min 10 min 10 min 10 min                                    Assessment: Tolerated treatment well  Patient demonstrated fatigue post treatment, exhibited good technique with therapeutic exercises and would benefit from continued PT Patient reports most difficulty with prone bilateral I CS retraction, no increased discomfort noted with any TE performed  Plan: Continue per plan of care  Progress treatment as tolerated

## 2018-06-08 ENCOUNTER — OFFICE VISIT (OUTPATIENT)
Dept: PHYSICAL THERAPY | Facility: REHABILITATION | Age: 63
End: 2018-06-08
Payer: MEDICARE

## 2018-06-08 DIAGNOSIS — M54.12 CERVICAL RADICULOPATHY: Primary | ICD-10-CM

## 2018-06-08 PROCEDURE — 97110 THERAPEUTIC EXERCISES: CPT | Performed by: PHYSICAL THERAPIST

## 2018-06-08 PROCEDURE — G8985 CARRY GOAL STATUS: HCPCS | Performed by: PHYSICAL THERAPIST

## 2018-06-08 PROCEDURE — 97112 NEUROMUSCULAR REEDUCATION: CPT | Performed by: PHYSICAL THERAPIST

## 2018-06-08 PROCEDURE — 97140 MANUAL THERAPY 1/> REGIONS: CPT | Performed by: PHYSICAL THERAPIST

## 2018-06-08 PROCEDURE — G8986 CARRY D/C STATUS: HCPCS | Performed by: PHYSICAL THERAPIST

## 2018-06-08 NOTE — PROGRESS NOTES
PT Discharge    Today's date: 2018  Patient name: Tiffany Mccallum  : 1955  MRN: [id number]  Referring provider: Roxann Nicholas DO  Dx:   Encounter Diagnosis     ICD-10-CM    1  Cervical radiculopathy M54 12        Start Time: 0900  Stop Time: 0950  Total time in clinic (min): 50 minutes    Assessment  Functional limitations: Patient reports to evaluation with Radiculopathy, cervical region  (primary encounter diagnosis) with the following functional impairments: reaching, lifting, driving, lateral rotation of the head, extension, sleeping at night  Assessment details: Tiffany Mccallum is a 70y o  year old male who presents to IE with:   Radiculopathy, cervical region  (primary encounter diagnosis)    Fely Valenzuela has made the following improvements since beginning PT: decreased pain, increased ROM, increased strength and increased tolerance to activities   Fely Valenzuela and PT mutually agree to transition to HEP at this time secondary to gains made in PT  he has been given updated HEP with verbalized understanding and compliance  Fely Valenzuela is encouraged to contact PT with any questions or concerns in the future  Understanding of Dx/Px/POC: good   Prognosis: good  Prognosis details: Fely Valenzuela prognosis may be affected by the following: HTN, arthritis    Goals  Short-Term Goals:   1  Patient will increased ROM by 10 degrees in 4 weeks- Met  2  Patient will report pain less than 3/10 in 4 weeks  -  Met    Long-Term Goals:   1  Patient will demonstrate symmetrical cervical lateral rotation at time of discharge  - Met  2  Patient independent with HEP at time of discharge  - Met  3  Patient will be able to lift and perform IADL with R UE with minimal to no pain at time of discharge  - Met    Plan  Treatment plan discussed with: patient  Plan details: Thank you for referring Tiffany Mccallum to Physical Therapy at [place] [address] and for the opportunity to coordinate care            Subjective Evaluation    History of Present Illness  Mechanism of injury: Rupali Quiles has been seen for total of 16 visits for OP PT for cervicalgia  Patient rates overall improvement since beginning PT 80%  Patient's global rating of change is " Quite a bit better (5) " Patient reports improvements with ROM and ADL's noting less pain in R UE and ability to use R UE without N/T sensation as well  He reports improvements with extension at this time  Rupali Quiles and PT mutually agree to transition to HEP at this time secondary to gains made in PT  Patient given updated HEP with verbalized understanding and compliance  he is encouraged to contact PT with any questions or concerns in the future  Quality of life: fair    Pain  Current pain ratin  At best pain ratin  At worst pain ratin  Location: Cervical spine and R UE   Quality: throbbing  Aggravating factors: lifting and overhead activity  Progression: no change    Hand dominance: right      Diagnostic Tests  No diagnostic tests performed  Treatments  Previous treatment: physical therapy  Current treatment: physical therapy  Patient Goals  Patient goals for therapy: decreased pain, increased motion, increased strength and independence with ADLs/IADLs  Patient goal: "get it to the point where I"m not feeling this constant throbbing thing in my right arm and shoulder "         Objective     Special Questions  Negative for dizziness, faints, headaches, trouble swallowing, difficulty breathing and visual change    Postural Observations  Seated posture: fair  Standing posture: fair    Additional Postural Observation Details  Patient demonstrates increased thoracic kyphosis and cervical protrusion, rounded shoulders  Palpation     Right   Hypertonic in the upper trapezius  Tenderness of the cervical interspinals       Neurological Testing     Sensation   Cervical/Thoracic   Left   Intact: light touch    Right   Intact: light touch    Reflexes   Left   Biceps (C5/C6): normal (2+)  Brachioradialis (C6): normal (2+)  Triceps (C7): normal (2+)    Right   Biceps (C5/C6): normal (2+)  Brachioradialis (C6): normal (2+)  Triceps (C7): normal (2+)    Additional Neurological Details  Patient denies N/T in R UE at this time         Active Range of Motion   Cervical/Thoracic Spine   Cervical    Flexion: 60 degrees   Extension: 50 degrees   Left lateral flexion: 30 degrees   Right lateral flexion: 30 degrees   Left rotation: 80 degrees   Right rotation: 80 degrees with pain    Strength/Myotome Testing   Cervical Spine     Left   Interossei strength (t1): 4  Neck lateral flexion (C3): WFL    Right   Interossei strength (t1): 4etr  Neck lateral flexion (C3): WFL    Left Shoulder     Planes of Motion   Flexion: 5   Extension: 5 and WFL   Abduction: 5 and WFL   Adduction: WFL   External rotation at 0°: 5   Internal rotation at 0°: 5     Right Shoulder     Planes of Motion   Flexion: 5   Extension: 5 and WFL   Abduction: 5 and WFL   Adduction: WFL   External rotation at 0°: 5   Internal rotation at 0°: 5     Left Elbow   Flexion: 5 and WFL  Extension: 5    Right Elbow   Flexion: 5 and WFL  Extension: 5    Left Wrist/Hand   Wrist extension: 5 and WFL  Wrist flexion: 5 and WFL  Radial deviation: WFL    Right Wrist/Hand   Wrist extension: 5 and WFL  Wrist flexion: 5 and WFL  Radial deviation: WFL    Tests   Cervical     Left   Negative alar ligament integrity, cervical distraction, Spurling's sign and cervical compression test       Right   Negative alar ligament integrity, cervical distraction, Spurling's sign and cervical compression test         Flowsheet Rows      Most Recent Value   PT/OT G-Codes   Current Score  68   Projected Score  67   Assessment Type  Discharge   G code set  Carrying, Moving & Handling Objects   Carrying, Moving and Handling Objects Goal Status ()  CJ   Carrying, Moving and Handling Objects Discharge Status ()  CJ          Precautions: HTN, arthritis   Access code: [email or web address]   * Indicates part of HEP     Daily Treatment Diary     Manual  5/22 5/25 5/29 6/1 6/5 6/8 5/7 5/11 5/14 5/17   TrP R UT   4 min    5 min 5 min 5 min np   Prone face cutout R rotation opening     3 min 3min 5 min 5 min 5 min 5 min   Prone Thoracic P/A mobs grade II 10 min grade 3-5 4 min LX 4 min LX 5 min 5 min 5min 5 min 5 min 5 min 5min grade 3-4   R Radial nerve glides       5 min  3 min    prone CS MWM R rotation overpressure with pillow under chest 5 min 4 min LX 4 min LX supine 10 min             Exercise Diary  5/22 5/25 5/29 6/1 6/5 6/8 5/8 5/11 5/14 5/17   Upper trapezius stretch :[id number] "05x10 ea :05x10 ea :05x 10 ea :05x10 ea :05x10 ea :05x10 ea :05x10 ea :05x 10 ea :[id number]   Levator stretch :[id number] "05x10 ea :05x10 :05x 10 ea :05x10 ea :05x10 ea :05x10 ea :05x10 ea :05x 10 ea :[id number]   3 finger ROM* :05x10 ea :05x10 ea :05x10 ea :05x 10  :05x10  :05x10  :05x10 ea :05x10 ea :05x 10 ea :05 x 10ea   Scapular retraction 20 20 20 OTB w/ ER 2x10 OTB w/ER 3x10 OTB w/ER 3x10 2x10 2x10 20    Sidelying ER 2# 3x10 ea 2# 3x10 ea 2# 3x10 ea NP today  2# 3x12 ea 1# 3x10 1# 3x10 NP today 1# 3x15   TB rows GTB 3x12 GTB 3x12 GTB 3x12 GTB 3x12 GTB 3x15 GTB 3x15 OTB 3x10 GTB 3x10 GTB 3x10 GTB 3x12   TB LPD GTB 3x12 GTB 3x12 GTB 3x10 GTB 3x12 GTB 3x15 GTB 3x15 OTB 3x10 GTB 3x10 GTB 3x10 GTB 3x12   Cervical SNAGs extension, rotation       :05x10  :05x10  NP today    Prone pillow under chest, bilateral I w CS retraction 5x3"x3 5x3"x3 5x3"x3 5x3"x3 5x3''x3 5x5"x3       Cat/camel          10 ea 10ea x5"   R radial nerve glides    10 10 np   10 ea np   TB horizontal abduction    PTB 2x10 PTB 2x10 ptb 2x10                                                                                                                   Modalities  4/27 5/1 5/3 5/8 5/11 5/14 5/17 5/25 5/29    MHP with IFC 10 min 10 min 10 min 10 min 10 min 10 min 10 min 10 min 10 min

## 2018-06-12 ENCOUNTER — APPOINTMENT (OUTPATIENT)
Dept: PHYSICAL THERAPY | Facility: REHABILITATION | Age: 63
End: 2018-06-12
Payer: MEDICARE

## 2018-06-15 ENCOUNTER — APPOINTMENT (OUTPATIENT)
Dept: PHYSICAL THERAPY | Facility: REHABILITATION | Age: 63
End: 2018-06-15
Payer: MEDICARE

## 2018-06-19 ENCOUNTER — APPOINTMENT (OUTPATIENT)
Dept: PHYSICAL THERAPY | Facility: REHABILITATION | Age: 63
End: 2018-06-19
Payer: MEDICARE

## 2018-06-22 ENCOUNTER — APPOINTMENT (OUTPATIENT)
Dept: PHYSICAL THERAPY | Facility: REHABILITATION | Age: 63
End: 2018-06-22
Payer: MEDICARE

## 2018-07-08 ENCOUNTER — OFFICE VISIT (OUTPATIENT)
Dept: URGENT CARE | Age: 63
End: 2018-07-08
Payer: MEDICARE

## 2018-07-08 VITALS
OXYGEN SATURATION: 98 % | DIASTOLIC BLOOD PRESSURE: 80 MMHG | HEIGHT: 67 IN | RESPIRATION RATE: 20 BRPM | BODY MASS INDEX: 26.68 KG/M2 | HEART RATE: 72 BPM | WEIGHT: 170 LBS | TEMPERATURE: 98 F | SYSTOLIC BLOOD PRESSURE: 124 MMHG

## 2018-07-08 DIAGNOSIS — M54.12 CERVICAL RADICULOPATHY: Primary | ICD-10-CM

## 2018-07-08 PROCEDURE — 99213 OFFICE O/P EST LOW 20 MIN: CPT | Performed by: FAMILY MEDICINE

## 2018-07-08 PROCEDURE — G0463 HOSPITAL OUTPT CLINIC VISIT: HCPCS | Performed by: FAMILY MEDICINE

## 2018-07-08 RX ORDER — PREDNISONE 10 MG/1
TABLET ORAL
Qty: 21 TABLET | Refills: 0 | Status: SHIPPED | OUTPATIENT
Start: 2018-07-08 | End: 2019-01-08

## 2018-07-08 RX ORDER — CYCLOBENZAPRINE HCL 10 MG
10 TABLET ORAL 3 TIMES DAILY PRN
Qty: 20 TABLET | Refills: 0 | Status: SHIPPED | OUTPATIENT
Start: 2018-07-08 | End: 2018-07-12 | Stop reason: SDUPTHER

## 2018-07-08 NOTE — PATIENT INSTRUCTIONS
Take prednisone taper as directed with food  Day 1: take 6  Day 2: take 5  Day 3: take 4  Day 4: take 3  Day 5: take 2  Day 6: take 1  Avoid NSAIDS while taking  Take flexeril every 8 hours as needed (may cause drowsiness)  Ice to neck 20 minutes at a time, several times per day for 48 hours, then ice or moist heat  Sleep with a rolled up hand towel under your neck for support  Gentle stretches/home exercises  Follow up with PT  Recheck with your PCP in 5-7 days if no improvement, or if worsening signs or symptoms develop  ER if worsening

## 2018-07-12 ENCOUNTER — TELEPHONE (OUTPATIENT)
Dept: INTERNAL MEDICINE CLINIC | Facility: CLINIC | Age: 63
End: 2018-07-12

## 2018-07-12 DIAGNOSIS — M54.12 CERVICAL RADICULOPATHY: ICD-10-CM

## 2018-07-12 PROBLEM — R79.89 LOW VITAMIN D LEVEL: Status: ACTIVE | Noted: 2018-01-23

## 2018-07-12 PROBLEM — J30.2 SEASONAL ALLERGIES: Status: ACTIVE | Noted: 2018-01-23

## 2018-07-12 RX ORDER — CYCLOBENZAPRINE HCL 5 MG
5 TABLET ORAL EVERY 8 HOURS PRN
Qty: 20 TABLET | Refills: 0 | Status: SHIPPED | OUTPATIENT
Start: 2018-07-12 | End: 2019-07-10

## 2018-07-12 NOTE — TELEPHONE ENCOUNTER
PT  REIINJURED HIS BACK THIS PAST Friday  HE WOULD LIKE A SCRIPT FOR FLEXERIL  WHEN HE HURT IT LAST TIME HE HAD SOME FLEXERIL LEFT  AND IT HELPED  ALSO, WHEN HE WENT TO [facility] LAST TIME FOR HIS BACK THEY ALSO GAVE HIM PREDNISONE  ALSO, NEEDS SCRIPT FOR PHYSICAL THERAPY SINCE HE D/C'D IT

## 2018-07-12 NOTE — TELEPHONE ENCOUNTER
Sounds like he is having return of neck pain? Re-ordered flexeril at 5mg dose to take as needed      But since previous referral for PT is inactive, if he would like to try PT again, he will have to come in and see me first

## 2018-08-14 DIAGNOSIS — I10 ESSENTIAL HYPERTENSION: ICD-10-CM

## 2018-08-14 RX ORDER — AMLODIPINE BESYLATE 10 MG/1
10 TABLET ORAL DAILY
Qty: 90 TABLET | Refills: 1 | Status: SHIPPED | OUTPATIENT
Start: 2018-08-14 | End: 2018-12-07 | Stop reason: SDUPTHER

## 2018-12-07 DIAGNOSIS — D56.8: ICD-10-CM

## 2018-12-07 DIAGNOSIS — Z13.220 ENCOUNTER FOR LIPID SCREENING FOR CARDIOVASCULAR DISEASE: ICD-10-CM

## 2018-12-07 DIAGNOSIS — Z13.6 ENCOUNTER FOR LIPID SCREENING FOR CARDIOVASCULAR DISEASE: ICD-10-CM

## 2018-12-07 DIAGNOSIS — I10 ESSENTIAL HYPERTENSION: ICD-10-CM

## 2018-12-07 DIAGNOSIS — R79.89 LOW VITAMIN D LEVEL: Primary | ICD-10-CM

## 2018-12-07 RX ORDER — AMLODIPINE BESYLATE 10 MG/1
10 TABLET ORAL DAILY
Qty: 90 TABLET | Refills: 0 | Status: SHIPPED | OUTPATIENT
Start: 2018-12-07 | End: 2019-05-28 | Stop reason: SDUPTHER

## 2018-12-07 RX ORDER — LISINOPRIL AND HYDROCHLOROTHIAZIDE 20; 12.5 MG/1; MG/1
2 TABLET ORAL DAILY
Qty: 180 TABLET | Refills: 0 | Status: SHIPPED | OUTPATIENT
Start: 2018-12-07 | End: 2019-02-27 | Stop reason: SDUPTHER

## 2019-01-03 ENCOUNTER — APPOINTMENT (OUTPATIENT)
Dept: LAB | Facility: CLINIC | Age: 64
End: 2019-01-03
Payer: MEDICARE

## 2019-01-03 DIAGNOSIS — I10 ESSENTIAL HYPERTENSION: ICD-10-CM

## 2019-01-03 DIAGNOSIS — Z13.220 ENCOUNTER FOR LIPID SCREENING FOR CARDIOVASCULAR DISEASE: ICD-10-CM

## 2019-01-03 DIAGNOSIS — D56.8: ICD-10-CM

## 2019-01-03 DIAGNOSIS — Z13.6 ENCOUNTER FOR LIPID SCREENING FOR CARDIOVASCULAR DISEASE: ICD-10-CM

## 2019-01-03 LAB
ALBUMIN SERPL BCP-MCNC: 3.9 G/DL (ref 3.5–5)
ALP SERPL-CCNC: 60 U/L (ref 46–116)
ALT SERPL W P-5'-P-CCNC: 40 U/L (ref 12–78)
ANION GAP SERPL CALCULATED.3IONS-SCNC: 7 MMOL/L (ref 4–13)
AST SERPL W P-5'-P-CCNC: 14 U/L (ref 5–45)
BASOPHILS # BLD AUTO: 0.04 THOUSANDS/ΜL (ref 0–0.1)
BASOPHILS NFR BLD AUTO: 1 % (ref 0–1)
BILIRUB SERPL-MCNC: 1.3 MG/DL (ref 0.2–1)
BUN SERPL-MCNC: 23 MG/DL (ref 5–25)
CALCIUM SERPL-MCNC: 9.1 MG/DL (ref 8.3–10.1)
CHLORIDE SERPL-SCNC: 104 MMOL/L (ref 100–108)
CHOLEST SERPL-MCNC: 163 MG/DL (ref 50–200)
CO2 SERPL-SCNC: 30 MMOL/L (ref 21–32)
CREAT SERPL-MCNC: 1 MG/DL (ref 0.6–1.3)
EOSINOPHIL # BLD AUTO: 0.32 THOUSAND/ΜL (ref 0–0.61)
EOSINOPHIL NFR BLD AUTO: 7 % (ref 0–6)
ERYTHROCYTE [DISTWIDTH] IN BLOOD BY AUTOMATED COUNT: 17 % (ref 11.6–15.1)
GFR SERPL CREATININE-BSD FRML MDRD: 78 ML/MIN/1.73SQ M
GLUCOSE P FAST SERPL-MCNC: 89 MG/DL (ref 65–99)
HCT VFR BLD AUTO: 36.1 % (ref 36.5–49.3)
HDLC SERPL-MCNC: 45 MG/DL (ref 40–60)
HGB BLD-MCNC: 11.2 G/DL (ref 12–17)
IMM GRANULOCYTES # BLD AUTO: 0.02 THOUSAND/UL (ref 0–0.2)
IMM GRANULOCYTES NFR BLD AUTO: 0 % (ref 0–2)
LDLC SERPL CALC-MCNC: 96 MG/DL (ref 0–100)
LYMPHOCYTES # BLD AUTO: 0.81 THOUSANDS/ΜL (ref 0.6–4.47)
LYMPHOCYTES NFR BLD AUTO: 17 % (ref 14–44)
MCH RBC QN AUTO: 19.5 PG (ref 26.8–34.3)
MCHC RBC AUTO-ENTMCNC: 31 G/DL (ref 31.4–37.4)
MCV RBC AUTO: 63 FL (ref 82–98)
MONOCYTES # BLD AUTO: 0.43 THOUSAND/ΜL (ref 0.17–1.22)
MONOCYTES NFR BLD AUTO: 9 % (ref 4–12)
NEUTROPHILS # BLD AUTO: 3.11 THOUSANDS/ΜL (ref 1.85–7.62)
NEUTS SEG NFR BLD AUTO: 66 % (ref 43–75)
NRBC BLD AUTO-RTO: 0 /100 WBCS
PLATELET # BLD AUTO: 298 THOUSANDS/UL (ref 149–390)
PMV BLD AUTO: 10.7 FL (ref 8.9–12.7)
POTASSIUM SERPL-SCNC: 3.2 MMOL/L (ref 3.5–5.3)
PROT SERPL-MCNC: 7.4 G/DL (ref 6.4–8.2)
RBC # BLD AUTO: 5.74 MILLION/UL (ref 3.88–5.62)
SODIUM SERPL-SCNC: 141 MMOL/L (ref 136–145)
TRIGL SERPL-MCNC: 110 MG/DL
WBC # BLD AUTO: 4.73 THOUSAND/UL (ref 4.31–10.16)

## 2019-01-03 PROCEDURE — 80061 LIPID PANEL: CPT

## 2019-01-03 PROCEDURE — 36415 COLL VENOUS BLD VENIPUNCTURE: CPT

## 2019-01-03 PROCEDURE — 85025 COMPLETE CBC W/AUTO DIFF WBC: CPT

## 2019-01-03 PROCEDURE — 80053 COMPREHEN METABOLIC PANEL: CPT

## 2019-01-08 ENCOUNTER — OFFICE VISIT (OUTPATIENT)
Dept: INTERNAL MEDICINE CLINIC | Facility: CLINIC | Age: 64
End: 2019-01-08
Payer: MEDICARE

## 2019-01-08 VITALS
DIASTOLIC BLOOD PRESSURE: 80 MMHG | RESPIRATION RATE: 18 BRPM | SYSTOLIC BLOOD PRESSURE: 134 MMHG | OXYGEN SATURATION: 99 % | HEART RATE: 62 BPM | HEIGHT: 67 IN | WEIGHT: 183.4 LBS | BODY MASS INDEX: 28.79 KG/M2 | TEMPERATURE: 98.8 F

## 2019-01-08 DIAGNOSIS — Z80.9 FHX: CANCER: ICD-10-CM

## 2019-01-08 DIAGNOSIS — I10 ESSENTIAL HYPERTENSION: Primary | ICD-10-CM

## 2019-01-08 DIAGNOSIS — Z11.59 NEED FOR HEPATITIS C SCREENING TEST: ICD-10-CM

## 2019-01-08 PROCEDURE — 99213 OFFICE O/P EST LOW 20 MIN: CPT | Performed by: INTERNAL MEDICINE

## 2019-01-08 NOTE — PROGRESS NOTES
Assessment/Plan:     Diagnoses and all orders for this visit:    Essential hypertension  Comments:  BP doing well and controlled readings at home  c/w CCB/ACEI/diuretic    Need for hepatitis C screening test  Comments:  never had before, ordered  Orders:  -     Hepatitis C antibody; Future    FHx: cancer  Comments:  refer to genetic counselor, emphasized importance of seeing counselor  Orders:  -     Ambulatory referral to Genetics; Future    Other orders  -     Cancel: Ambulatory referral to Genetics; Future          Subjective:      Patient ID: Kianna Garcia is a 75 y o  male  HPI    Here for follow up, reviewed meds with Mitch Bautista today  Going thru a divorce with his wife over last 10 months and moved into his own place recently  BP at home, readings have been in 130s/80s  Mitch Bautista saw online that there is research going on regarding a 10 minute cancer screening test that screens for all cancers  His brother had colon cancer at a young age and tested positive for OSMANY gene  Mitch Bautista was never tested or saw a genetic counselor  He is exercising regularly and we discussed his recent BW results  Never had Hep C screening test in past   No other complaints  Past Medical History:   Diagnosis Date    Cervicalgia 10/29/2014    Prostatic hypertrophy     Benign     Vitals:    01/08/19 1057 01/08/19 1127   BP: 144/80 134/80   Pulse: 62    Resp: 18    Temp: 98 8 °F (37 1 °C)    TempSrc: Oral    SpO2: 99%    Weight: 83 2 kg (183 lb 6 4 oz)    Height: 5' 7" (1 702 m)      Body mass index is 28 72 kg/m²      Current Outpatient Prescriptions:     amLODIPine (NORVASC) 10 mg tablet, Take 1 tablet (10 mg total) by mouth daily, Disp: 90 tablet, Rfl: 0    Ascorbic Acid (VITAMIN C) 1000 MG tablet, Take 2 tablets by mouth daily, Disp: , Rfl:     aspirin 81 MG tablet, Take 1 tablet by mouth daily, Disp: , Rfl:     B Complex Vitamins (VITAMIN B-COMPLEX 100 IJ), Take by mouth, Disp: , Rfl:     BETA CAROTENE PO, Take by mouth, Disp: , Rfl:     CHROMIUM PO, Take by mouth, Disp: , Rfl:     coenzyme Q-10 100 MG capsule, Take by mouth, Disp: , Rfl:     Lactobacillus (ACIDOPHILUS PO), Take by mouth, Disp: , Rfl:     lisinopril-hydrochlorothiazide (PRINZIDE,ZESTORETIC) 20-12 5 MG per tablet, Take 2 tablets by mouth daily, Disp: 180 tablet, Rfl: 0    Multiple Vitamin-Folic Acid TABS, Take by mouth, Disp: , Rfl:     Omega-3 Fatty Acids (FISH OIL) 1,000 mg, Take 2 tablets by mouth daily, Disp: , Rfl:     Prasterone, DHEA, (DHEA 50 PO), Take 50 mg by mouth daily, Disp: , Rfl:     vitamin E, tocopherol, 400 units capsule, Take 400 Units by mouth 2 (two) times a day, Disp: , Rfl:     cyclobenzaprine (FLEXERIL) 5 mg tablet, Take 1 tablet (5 mg total) by mouth every 8 (eight) hours as needed for muscle spasms, Disp: 20 tablet, Rfl: 0  Allergies   Allergen Reactions    Albuterol Throat Swelling         Review of Systems   Constitutional: Negative for fever  HENT: Negative for congestion  Eyes: Negative for visual disturbance  Respiratory: Negative for shortness of breath  Cardiovascular: Negative for chest pain  Gastrointestinal: Negative for abdominal pain  Genitourinary: Negative for dysuria  Musculoskeletal: Negative for arthralgias  Skin: Negative for rash  Neurological: Negative for headaches  Psychiatric/Behavioral: Negative for dysphoric mood  Objective:      /80   Pulse 62   Temp 98 8 °F (37 1 °C) (Oral)   Resp 18   Ht 5' 7" (1 702 m)   Wt 83 2 kg (183 lb 6 4 oz)   SpO2 99%   BMI 28 72 kg/m²          Physical Exam   Constitutional: He appears well-developed and well-nourished  HENT:   Head: Normocephalic and atraumatic  Mouth/Throat: Oropharynx is clear and moist    Eyes: Pupils are equal, round, and reactive to light  Conjunctivae are normal    Cardiovascular: Normal rate, regular rhythm and normal heart sounds  No murmur heard    Pulmonary/Chest: Effort normal and breath sounds normal  He has no wheezes  He has no rales  Abdominal: Soft  Bowel sounds are normal  There is no tenderness  Neurological: He is alert  Psychiatric: He has a normal mood and affect  His behavior is normal    Vitals reviewed        Results for orders placed or performed in visit on 01/03/19   CBC and differential   Result Value Ref Range    WBC 4 73 4 31 - 10 16 Thousand/uL    RBC 5 74 (H) 3 88 - 5 62 Million/uL    Hemoglobin 11 2 (L) 12 0 - 17 0 g/dL    Hematocrit 36 1 (L) 36 5 - 49 3 %    MCV 63 (L) 82 - 98 fL    MCH 19 5 (L) 26 8 - 34 3 pg    MCHC 31 0 (L) 31 4 - 37 4 g/dL    RDW 17 0 (H) 11 6 - 15 1 %    MPV 10 7 8 9 - 12 7 fL    Platelets 999 407 - 328 Thousands/uL    nRBC 0 /100 WBCs    Neutrophils Relative 66 43 - 75 %    Immat GRANS % 0 0 - 2 %    Lymphocytes Relative 17 14 - 44 %    Monocytes Relative 9 4 - 12 %    Eosinophils Relative 7 (H) 0 - 6 %    Basophils Relative 1 0 - 1 %    Neutrophils Absolute 3 11 1 85 - 7 62 Thousands/µL    Immature Grans Absolute 0 02 0 00 - 0 20 Thousand/uL    Lymphocytes Absolute 0 81 0 60 - 4 47 Thousands/µL    Monocytes Absolute 0 43 0 17 - 1 22 Thousand/µL    Eosinophils Absolute 0 32 0 00 - 0 61 Thousand/µL    Basophils Absolute 0 04 0 00 - 0 10 Thousands/µL   Comprehensive metabolic panel   Result Value Ref Range    Sodium 141 136 - 145 mmol/L    Potassium 3 2 (L) 3 5 - 5 3 mmol/L    Chloride 104 100 - 108 mmol/L    CO2 30 21 - 32 mmol/L    ANION GAP 7 4 - 13 mmol/L    BUN 23 5 - 25 mg/dL    Creatinine 1 00 0 60 - 1 30 mg/dL    Glucose, Fasting 89 65 - 99 mg/dL    Calcium 9 1 8 3 - 10 1 mg/dL    AST 14 5 - 45 U/L    ALT 40 12 - 78 U/L    Alkaline Phosphatase 60 46 - 116 U/L    Total Protein 7 4 6 4 - 8 2 g/dL    Albumin 3 9 3 5 - 5 0 g/dL    Total Bilirubin 1 30 (H) 0 20 - 1 00 mg/dL    eGFR 78 ml/min/1 73sq m   Lipid Panel with Direct LDL reflex   Result Value Ref Range    Cholesterol 163 50 - 200 mg/dL    Triglycerides 110 <=150 mg/dL    HDL, Direct 45 40 - 60 mg/dL    LDL Calculated 96 0 - 100 mg/dL

## 2019-01-08 NOTE — PATIENT INSTRUCTIONS
1  Blood work  2  Genetic counseling  3   Return in 6 months or sooner if questions  4  2nd pneumonia vaccine after January 24, 2019(can be nurse visit)

## 2019-01-18 ENCOUNTER — CONSULT (OUTPATIENT)
Dept: GYNECOLOGIC ONCOLOGY | Facility: CLINIC | Age: 64
End: 2019-01-18
Payer: MEDICARE

## 2019-01-18 VITALS
HEART RATE: 66 BPM | BODY MASS INDEX: 29.19 KG/M2 | SYSTOLIC BLOOD PRESSURE: 122 MMHG | TEMPERATURE: 97.8 F | RESPIRATION RATE: 16 BRPM | WEIGHT: 186 LBS | HEIGHT: 67 IN | OXYGEN SATURATION: 97 % | DIASTOLIC BLOOD PRESSURE: 82 MMHG

## 2019-01-18 DIAGNOSIS — Z80.0 FAMILY HISTORY OF COLON CANCER: ICD-10-CM

## 2019-01-18 DIAGNOSIS — Z13.79 GENETIC TESTING: Primary | ICD-10-CM

## 2019-01-18 PROCEDURE — 99203 OFFICE O/P NEW LOW 30 MIN: CPT | Performed by: NURSE PRACTITIONER

## 2019-01-18 NOTE — ASSESSMENT & PLAN NOTE
We discussed risk and benefits of genetic testing  He understands the possible outcomes of testing, including no pathogenic mutation, a positive pathogenic mutation, and variant of undetermined signficance (VUS)  We discussed surveillance and prophylactic measures in the event of a positive finding  We discussed implications for the patient's first degree family members if a pathogenic mutation is identified  The patient verbalizes understanding and agrees to proceed with testing  A blood sample was collected and will be sent to [facility] for processing  He understands that she will be contacted by the company in the event her OOP co-payment exceeds $100  He has elected to have her results disclosed over the phone when available in approximately 2-4 weeks  In the event of a positive finding, the patient will be referred for formal genetic counseling, and any other appropriate referrals will also be made      30 minutes were spent in the care of this patient  Greater than 50% of the visit was spent in counseling and discussion of risks, benefits, and outcomes of genetic testing

## 2019-01-18 NOTE — PROGRESS NOTES
Robert Harley  1955  [facility]  [facility] Maylin Stevens  [address] [address] [phone]    Chief Complaint   Patient presents with    Consult     genectic testing       Assessment/Plan     Assessment:  1  Genetic testing     2  Family history of colon cancer       We discussed risk and benefits of genetic testing  He understands the possible outcomes of testing, including no pathogenic mutation, a positive pathogenic mutation, and variant of undetermined signficance (VUS)  We discussed surveillance and prophylactic measures in the event of a positive finding  We discussed implications for the patient's first degree family members if a pathogenic mutation is identified  The patient verbalizes understanding and agrees to proceed with testing  A blood sample was collected and will be sent to [facility] for processing  He understands that she will be contacted by the company in the event her OOP co-payment exceeds $100  He has elected to have her results disclosed over the phone when available in approximately 2-4 weeks  In the event of a positive finding, the patient will be referred for formal genetic counseling, and any other appropriate referrals will also be made      30 minutes were spent in the care of this patient  Greater than 50% of the visit was spent in counseling and discussion of risks, benefits, and outcomes of genetic testing  Cancer Staging  N/A      History of Present Illness: This is a 47-year-old who presents to the office for genetic testing  He has a family history of colon cancer in his brother, who was diagnosed after a routine colonoscopy at age 48  The brother subsequently had genetic testing done, and was found to have a pathogenic mutation of the OSMANY gene  Additionally, his father was diagnosed with stomach cancer at age 47  The patient has no personal history of cancer   He nor his other 4 (of 5) brothers have undergone genetic testing  Her is clinically well and without complaints  Review of Systems   Constitutional: Negative for activity change, appetite change, fatigue and fever  HENT: Negative  Respiratory: Negative for cough, chest tightness, shortness of breath and wheezing  Cardiovascular: Negative for chest pain, palpitations and leg swelling  Gastrointestinal: Negative for abdominal distention, abdominal pain, constipation, diarrhea, nausea and vomiting  Genitourinary: Negative for dysuria, flank pain, frequency, hematuria and urgency  Musculoskeletal: Negative for arthralgias, back pain, gait problem and myalgias  Skin: Negative for color change, pallor and rash  Neurological: Negative  Hematological: Negative  Psychiatric/Behavioral: Negative          Past Medical History:   Diagnosis Date    Anemia 1975    Cervicalgia 10/29/2014    Hypertension 2006    Prostatic hypertrophy     Benign       Past Surgical History:   Procedure Laterality Date    SKIN TAG REMOVAL      VASECTOMY      Vas Deferens           Family History   Problem Relation Age of Onset    Thalassemia Mother     Heart Valve Disease Mother         heart valve replaced    Hypertension Father     Stomach cancer Father     Colon cancer Brother 48        mass in colon, suspected cancer-dx'd at 49 y/o    Alcohol abuse Neg Hx     Substance Abuse Neg Hx     Mental illness Neg Hx     Depression Neg Hx        Social History     Social History    Marital status: /Civil Union     Spouse name: N/A    Number of children: N/A    Years of education: N/A     Occupational History    Retired, IT/      Social History Main Topics    Smoking status: Never Smoker    Smokeless tobacco: Never Used    Alcohol use Yes      Comment: drinks wine    Drug use: No    Sexual activity: Not Currently     Other Topics Concern    Not on file     Social History Narrative    Exercise habits    Living situations, wife and dog Uses safety equipment- Seatbelts         Current Outpatient Prescriptions:     amLODIPine (NORVASC) 10 mg tablet, Take 1 tablet (10 mg total) by mouth daily, Disp: 90 tablet, Rfl: 0    Ascorbic Acid (VITAMIN C) 1000 MG tablet, Take 2 tablets by mouth daily, Disp: , Rfl:     aspirin 81 MG tablet, Take 1 tablet by mouth daily, Disp: , Rfl:     B Complex Vitamins (VITAMIN B-COMPLEX 100 IJ), Take by mouth, Disp: , Rfl:     BETA CAROTENE PO, Take by mouth, Disp: , Rfl:     CHROMIUM PO, Take by mouth, Disp: , Rfl:     coenzyme Q-10 100 MG capsule, Take by mouth, Disp: , Rfl:     Lactobacillus (ACIDOPHILUS PO), Take by mouth, Disp: , Rfl:     lisinopril-hydrochlorothiazide (PRINZIDE,ZESTORETIC) 20-12 5 MG per tablet, Take 2 tablets by mouth daily, Disp: 180 tablet, Rfl: 0    Multiple Vitamin-Folic Acid TABS, Take by mouth, Disp: , Rfl:     Omega-3 Fatty Acids (FISH OIL) 1,000 mg, Take 2 tablets by mouth daily, Disp: , Rfl:     Prasterone, DHEA, (DHEA 50 PO), Take 50 mg by mouth daily, Disp: , Rfl:     vitamin E, tocopherol, 400 units capsule, Take 400 Units by mouth 2 (two) times a day, Disp: , Rfl:     cyclobenzaprine (FLEXERIL) 5 mg tablet, Take 1 tablet (5 mg total) by mouth every 8 (eight) hours as needed for muscle spasms (Patient not taking: Reported on 1/18/2019 ), Disp: 20 tablet, Rfl: 0    Allergies   Allergen Reactions    Albuterol Throat Swelling       Physical Exam   Constitutional: He is oriented to person, place, and time  He appears well-developed and well-nourished  HENT:   Head: Normocephalic and atraumatic  Eyes: Right eye exhibits no discharge  Left eye exhibits no discharge  Neck: Normal range of motion  Neck supple  Pulmonary/Chest: Effort normal  No respiratory distress  Musculoskeletal: Normal range of motion  Neurological: He is alert and oriented to person, place, and time  Skin: Skin is warm and dry  No rash noted  No erythema     Psychiatric: He has a normal mood and affect   His behavior is normal  Judgment and thought content normal

## 2019-01-30 ENCOUNTER — DOCUMENTATION (OUTPATIENT)
Dept: GYNECOLOGIC ONCOLOGY | Facility: CLINIC | Age: 64
End: 2019-01-30

## 2019-01-30 ENCOUNTER — CLINICAL SUPPORT (OUTPATIENT)
Dept: INTERNAL MEDICINE CLINIC | Facility: CLINIC | Age: 64
End: 2019-01-30
Payer: MEDICARE

## 2019-01-30 DIAGNOSIS — Z23 NEED FOR VACCINATION FOR STREP PNEUMONIAE: Primary | ICD-10-CM

## 2019-01-30 PROBLEM — Z15.01 MONOALLELIC MUTATION OF ATM GENE: Status: ACTIVE | Noted: 2019-01-30

## 2019-01-30 PROBLEM — Z15.89 MONOALLELIC MUTATION OF ATM GENE: Status: ACTIVE | Noted: 2019-01-30

## 2019-01-30 PROBLEM — Z15.09 MONOALLELIC MUTATION OF ATM GENE: Status: ACTIVE | Noted: 2019-01-30

## 2019-01-30 PROCEDURE — G0009 ADMIN PNEUMOCOCCAL VACCINE: HCPCS

## 2019-01-30 PROCEDURE — 90732 PPSV23 VACC 2 YRS+ SUBQ/IM: CPT

## 2019-01-30 NOTE — PROGRESS NOTES
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       The OSMANY gene is associated with an increased risk for autosomal dominant breast, pancreatic and prostate cancers  Elevated risk for other cancers, including colon cancer, may exist (per [facility] result report clinical summary)       Per Yoandy Santiago with [employer], [facility] has no set guidelines on how to manage surveillance  Patient is due for a colonoscopy this year, as was encouraged to schedule a short-interval follow-up visit with Dr Jamal Cruz  He prefers to call his office on his own to set up this appointment  Secondary to prostate cancer risk, follow-up with a urologist may be considered  The patient was advised that first degree relatives are at risk to have inherited the same genetic mutation and are encouraged to undergo genetic testing  He has one daughter  It was explained to the patient that in women, genetic mutation of the OSMANY gene carries a 17-60% lifetime risk of breast cancer       All questions answered to the patient's satisfaction  Results released to patient via [facility] portal for his records  Copy to be sent to referring physician, Dr Dinesh Jeronimo  Patient is eligible for free genetic counseling consultation through [facility]; email link provided to schedule appointment  He is aware to call the office with any concerns, questions, or need for additional resources

## 2019-02-27 DIAGNOSIS — I10 ESSENTIAL HYPERTENSION: ICD-10-CM

## 2019-02-27 RX ORDER — LISINOPRIL AND HYDROCHLOROTHIAZIDE 20; 12.5 MG/1; MG/1
2 TABLET ORAL DAILY
Qty: 180 TABLET | Refills: 1 | Status: SHIPPED | OUTPATIENT
Start: 2019-02-27 | End: 2019-07-10 | Stop reason: SDUPTHER

## 2019-05-28 DIAGNOSIS — I10 ESSENTIAL HYPERTENSION: ICD-10-CM

## 2019-05-28 RX ORDER — AMLODIPINE BESYLATE 10 MG/1
10 TABLET ORAL DAILY
Qty: 90 TABLET | Refills: 1 | Status: SHIPPED | OUTPATIENT
Start: 2019-05-28 | End: 2019-07-10 | Stop reason: SDUPTHER

## 2019-07-09 ENCOUNTER — APPOINTMENT (OUTPATIENT)
Dept: LAB | Facility: CLINIC | Age: 64
End: 2019-07-09
Payer: MEDICARE

## 2019-07-09 DIAGNOSIS — Z11.59 NEED FOR HEPATITIS C SCREENING TEST: ICD-10-CM

## 2019-07-09 PROCEDURE — 36415 COLL VENOUS BLD VENIPUNCTURE: CPT

## 2019-07-09 PROCEDURE — 86803 HEPATITIS C AB TEST: CPT

## 2019-07-10 ENCOUNTER — OFFICE VISIT (OUTPATIENT)
Dept: INTERNAL MEDICINE CLINIC | Facility: CLINIC | Age: 64
End: 2019-07-10
Payer: MEDICARE

## 2019-07-10 VITALS
HEART RATE: 57 BPM | BODY MASS INDEX: 28.91 KG/M2 | WEIGHT: 184.2 LBS | OXYGEN SATURATION: 98 % | SYSTOLIC BLOOD PRESSURE: 132 MMHG | DIASTOLIC BLOOD PRESSURE: 82 MMHG | HEIGHT: 67 IN

## 2019-07-10 DIAGNOSIS — Z15.09 MONOALLELIC MUTATION OF ATM GENE: Primary | ICD-10-CM

## 2019-07-10 DIAGNOSIS — D56.8: ICD-10-CM

## 2019-07-10 DIAGNOSIS — Z15.01 MONOALLELIC MUTATION OF ATM GENE: Primary | ICD-10-CM

## 2019-07-10 DIAGNOSIS — Z13.6 ENCOUNTER FOR LIPID SCREENING FOR CARDIOVASCULAR DISEASE: ICD-10-CM

## 2019-07-10 DIAGNOSIS — Z00.00 MEDICARE ANNUAL WELLNESS VISIT, SUBSEQUENT: ICD-10-CM

## 2019-07-10 DIAGNOSIS — Z12.5 SCREENING FOR PROSTATE CANCER: ICD-10-CM

## 2019-07-10 DIAGNOSIS — Z15.89 MONOALLELIC MUTATION OF ATM GENE: Primary | ICD-10-CM

## 2019-07-10 DIAGNOSIS — I10 ESSENTIAL HYPERTENSION: ICD-10-CM

## 2019-07-10 DIAGNOSIS — Z13.220 ENCOUNTER FOR LIPID SCREENING FOR CARDIOVASCULAR DISEASE: ICD-10-CM

## 2019-07-10 LAB — HCV AB SER QL: NORMAL

## 2019-07-10 PROCEDURE — G0439 PPPS, SUBSEQ VISIT: HCPCS | Performed by: INTERNAL MEDICINE

## 2019-07-10 PROCEDURE — 99213 OFFICE O/P EST LOW 20 MIN: CPT | Performed by: INTERNAL MEDICINE

## 2019-07-10 RX ORDER — AMLODIPINE BESYLATE 10 MG/1
10 TABLET ORAL DAILY
Qty: 90 TABLET | Refills: 1 | Status: SHIPPED | OUTPATIENT
Start: 2019-07-10 | End: 2020-01-15 | Stop reason: SDUPTHER

## 2019-07-10 RX ORDER — LISINOPRIL AND HYDROCHLOROTHIAZIDE 20; 12.5 MG/1; MG/1
2 TABLET ORAL DAILY
Qty: 180 TABLET | Refills: 1 | Status: SHIPPED | OUTPATIENT
Start: 2019-07-10 | End: 2020-01-15 | Stop reason: SDUPTHER

## 2019-07-10 NOTE — PATIENT INSTRUCTIONS
1  Colonoscopy  2  Urology  3  Return in 6 months or sooner if questions  Obesity   AMBULATORY CARE:   Obesity  is when your body mass index (BMI) is greater than 30  Your healthcare provider will use your height and weight to measure your BMI  The risks of obesity include  many health problems, such as injuries or physical disability  You may need tests to check for the following:  · Diabetes     · High blood pressure or high cholesterol     · Heart disease     · Gallbladder or liver disease     · Cancer of the colon, breast, prostate, liver, or kidney     · Sleep apnea     · Arthritis or gout  Seek care immediately if:   · You have a severe headache, confusion, or difficulty speaking  · You have weakness on one side of your body  · You have chest pain, sweating, or shortness of breath  Contact your healthcare provider if:   · You have symptoms of gallbladder or liver disease, such as pain in your upper abdomen  · You have knee or hip pain and discomfort while walking  · You have symptoms of diabetes, such as intense hunger and thirst, and frequent urination  · You have symptoms of sleep apnea, such as snoring or daytime sleepiness  · You have questions or concerns about your condition or care  Treatment for obesity  focuses on helping you lose weight to improve your health  Even a small decrease in BMI can reduce the risk for many health problems  Your healthcare provider will help you set a weight-loss goal   · Lifestyle changes  are the first step in treating obesity  These include making healthy food choices and getting regular physical activity  Your healthcare provider may suggest a weight-loss program that involves coaching, education, and therapy  · Medicine  may help you lose weight when it is used with a healthy diet and physical activity  · Surgery  can help you lose weight if you are very obese and have other health problems   There are several types of weight-loss surgery  Ask your healthcare provider for more information  Be successful losing weight:   · Set small, realistic goals  An example of a small goal is to walk for 20 minutes 5 days a week  Anther goal is to lose 5% of your body weight  · Tell friends, family members, and coworkers about your goals  and ask for their support  Ask a friend to lose weight with you, or join a weight-loss support group  · Identify foods or triggers that may cause you to overeat , and find ways to avoid them  Remove tempting high-calorie foods from your home and workplace  Place a bowl of fresh fruit on your kitchen counter  If stress causes you to eat, then find other ways to cope with stress  · Keep a diary to track what you eat and drink  Also write down how many minutes of physical activity you do each day  Weigh yourself once a week and record it in your diary  Eating changes: You will need to eat 500 to 1,000 fewer calories each day than you currently eat to lose 1 to 2 pounds a week  The following changes will help you cut calories:  · Eat smaller portions  Use small plates, no larger than 9 inches in diameter  Fill your plate half full of fruits and vegetables  Measure your food using measuring cups until you know what a serving size looks like  · Eat 3 meals and 1 or 2 snacks each day  Plan your meals in advance  [employer] and eat at home most of the time  Eat slowly  · Eat fruits and vegetables at every meal   They are low in calories and high in fiber, which makes you feel full  Do not add butter, margarine, or cream sauce to vegetables  Use herbs to season steamed vegetables  · Eat less fat and fewer fried foods  Eat more baked or grilled chicken and fish  These protein sources are lower in calories and fat than red meat  Limit fast food  Dress your salads with olive oil and vinegar instead of bottled dressing  · Limit the amount of sugar you eat  Do not drink sugary beverages   Limit alcohol  Activity changes:  Physical activity is good for your body in many ways  It helps you burn calories and build strong muscles  It decreases stress and depression, and improves your mood  It can also help you sleep better  Talk to your healthcare provider before you begin an exercise program   · Exercise for at least 30 minutes 5 days a week  Start slowly  Set aside time each day for physical activity that you enjoy and that is convenient for you  It is best to do both weight training and an activity that increases your heart rate, such as walking, bicycling, or swimming  · Find ways to be more active  Do yard work and housecleaning  Walk up the stairs instead of using elevators  Spend your leisure time going to events that require walking, such as outdoor festivals or fairs  This extra physical activity can help you lose weight and keep it off  Follow up with your healthcare provider as directed: You may need to meet with a dietitian  Write down your questions so you remember to ask them during your visits  © 2017 [address] Information is for End User's use only and may not be sold, redistributed or otherwise used for commercial purposes  All illustrations and images included in CareNotes® are the copyrighted property of A D A M , Inc  or Kleber [employer]  The above information is an  only  It is not intended as medical advice for individual conditions or treatments  Talk to your doctor, nurse or pharmacist before following any medical regimen to see if it is safe and effective for you  Urinary Incontinence   WHAT YOU NEED TO KNOW:   What is urinary incontinence? Urinary incontinence (UI) is when you lose control of your bladder  What causes UI? UI occurs because your bladder cannot store or empty urine properly  The following are the most common types of UI:  · Stress incontinence  is when you leak urine due to increased bladder pressure   This may happen when you cough, sneeze, or exercise  · Urge incontinence  is when you feel the need to urinate right away and leak urine accidentally  · Mixed incontinence  is when you have both stress and urge UI  What are the signs and symptoms of UI?   · You feel like your bladder does not empty completely when you urinate  · You urinate often and need to urinate immediately  · You leak urine when you sleep, or you wake up with the urge to urinate  · You leak urine when you cough, sneeze, exercise, or laugh  How is UI diagnosed? Your healthcare provider will ask how often you leak urine and whether you have stress or urge symptoms  Tell him which medicines you take, how often you urinate, and how much liquid you drink each day  You may need any of the following tests:  · Urine tests  may show infection or kidney function  · A pelvic exam  may be done to check for blockages  A pelvic exam will also show if your bladder, uterus, or other organs have moved out of place  · An x-ray, ultrasound, or CT  may show problems with parts of your urinary system  You may be given contrast liquid to help your organs show up better in the pictures  Tell the healthcare provider if you have ever had an allergic reaction to contrast liquid  Do not enter the MRI room with anything metal  Metal can cause serious injury  Tell the healthcare provider if you have any metal in or on your body  · A bladder scan  will show how much urine is left in your bladder after you urinate  You will be asked to urinate and then healthcare providers will use a small ultrasound machine to check the urine left in your bladder  · Cystometry  is used to check the function of your urinary system  Your healthcare provider checks the pressure in your bladder while filling it with fluid  Your bladder pressure may also be tested when your bladder is full and while you urinate  How is UI treated?    · Medicines  can help strengthen your bladder control  · Electrical stimulation  is used to send a small amount of electrical energy to your pelvic floor muscles  This helps control your bladder function  Electrodes may be placed outside your body or in your rectum  For women, the electrodes may be placed in the vagina  · A bulking agent  may be injected into the wall of your urethra to make it thicker  This helps keep your urethra closed and decreases urine leakage  · Devices  such as a clamp, pessary, or tampon may help stop urine leaks  Ask your healthcare provider for more information about these and other devices  · Surgery  may be needed if other treatments do not work  Several types of surgery can help improve your bladder control  Ask your healthcare provider for more information about the surgery you may need  How can I manage my symptoms? · Do pelvic muscle exercises often  Your pelvic muscles help you stop urinating  Squeeze these muscles tight for 5 seconds, then relax for 5 seconds  Gradually work up to squeezing for 10 seconds  Do 3 sets of 15 repetitions a day, or as directed  This will help strengthen your pelvic muscles and improve bladder control  · A catheter  may be used to help empty your bladder  A catheter is a tiny, plastic tube that is put into your bladder to drain your urine  Your healthcare provider may tell you to use a catheter to prevent your bladder from getting too full and leaking urine  · Keep a UI record  Write down how often you leak urine and how much you leak  Make a note of what you were doing when you leaked urine  · Train your bladder  Go to the bathroom at set times, such as every 2 hours, even if you do not feel the urge to go  You can also try to hold your urine when you feel the urge to go  For example, hold your urine for 5 minutes when you feel the urge to go  As that becomes easier, hold your urine for 10 minutes  · Drink liquids as directed    Ask your healthcare provider how much liquid to drink each day and which liquids are best for you  You may need to limit the amount of liquid you drink to help control your urine leakage  Limit or do not have drinks that contain caffeine or alcohol  Do not drink any liquid right before you go to bed  · Prevent constipation  Eat a variety of high-fiber foods  Good examples are high-fiber cereals, beans, vegetables, and whole-grain breads  Prune juice may help make your bowel movement softer  Walking is the best way to trigger your intestines to have a bowel movement  · Exercise regularly and maintain a healthy weight  Ask your healthcare provider how much you should weigh and about the best exercise plan for you  Weight loss and exercise will decrease pressure on your bladder and help you control your leakage  Ask him to help you create a weight loss plan if you are overweight  When should I seek immediate care? · You have severe pain  · You are confused or cannot think clearly  When should I contact my healthcare provider? · You have a fever  · You see blood in your urine  · You have pain when you urinate  · You have new or worse pain, even after treatment  · Your mouth feels dry or you have vision changes  · Your urine is cloudy or smells bad  · You have questions or concerns about your condition or care  CARE AGREEMENT:   You have the right to help plan your care  Learn about your health condition and how it may be treated  Discuss treatment options with your caregivers to decide what care you want to receive  You always have the right to refuse treatment  The above information is an  only  It is not intended as medical advice for individual conditions or treatments  Talk to your doctor, nurse or pharmacist before following any medical regimen to see if it is safe and effective for you    © 2017 [address] Information is for End User's use only and may not be sold, redistributed or otherwise used for commercial purposes  All illustrations and images included in CareNotes® are the copyrighted property of A D A M , Inc  or Kleber lAbert  Cigarette Smoking and Your Health   AMBULATORY CARE:   Risks to your health if you smoke:  Nicotine and other chemicals found in tobacco damage every cell in your body  Even if you are a light smoker, you have an increased risk for cancer, heart disease, and lung disease  If you are pregnant or have diabetes, smoking increases your risk for complications  Benefits to your health if you stop smoking:   · You decrease respiratory symptoms such as coughing, wheezing, and shortness of breath  · You reduce your risk for cancers of the lung, mouth, throat, kidney, bladder, pancreas, stomach, and cervix  If you already have cancer, you increase the benefits of chemotherapy  You also reduce your risk for cancer returning or a second cancer from developing  · You reduce your risk for heart disease, blood clots, heart attack, and stroke  · You reduce your risk for lung infections, and diseases such as pneumonia, asthma, chronic bronchitis, and emphysema  · Your circulation improves  More oxygen can be delivered to your body  If you have diabetes, you lower your risk for complications, such as kidney, artery, and eye diseases  You also lower your risk for nerve damage  Nerve damage can lead to amputations, poor vision, and blindness  · You improve your body's ability to heal and to fight infections  Benefits to the health of others if you stop smoking:  Tobacco is harmful to nonsmokers who breathe in your secondhand smoke  The following are ways the health of others around you may improve when you stop smoking:  · You lower the risks for lung cancer and heart disease in nonsmoking adults  · If you are pregnant, you lower the risk for miscarriage, early delivery, low birth weight, and stillbirth   You also lower your baby's risk for SIDS, obesity, developmental delay, and neurobehavioral problems, such as ADHD  · If you have children, you lower their risk for ear infections, colds, pneumonia, bronchitis, and asthma  For more information and support to stop smoking:   · Smokefree  gov  Phone: 2- 550 - 071-1763  Web Address: [employer] smokefrTaxiPixi  Follow up with your healthcare provider as directed:  Write down your questions so you remember to ask them during your visits  © 2017 [address] Information is for End User's use only and may not be sold, redistributed or otherwise used for commercial purposes  All illustrations and images included in CareNotes® are the copyrighted property of A D A M , Inc  or Kleber Albert  The above information is an  only  It is not intended as medical advice for individual conditions or treatments  Talk to your doctor, nurse or pharmacist before following any medical regimen to see if it is safe and effective for you  Fall Prevention   WHAT YOU NEED TO KNOW:   What is fall prevention? Fall prevention includes ways to make your home and other areas safer  It also includes ways you can move more carefully to prevent a fall  What increases my risk for falls? · Lack of vitamin D    · Not getting enough sleep each night    · Trouble walking or keeping your balance, or foot problems    · Health conditions that cause changes in your blood pressure, vision, or muscle strength and coordination    · Medicines that make you dizzy, weak, or sleepy    · Problems seeing clearly    · Shoes that have high heels or are not supportive    · Tripping hazards, such as items left on the floor, no handrails on the stairs, or broken steps  How can I help protect myself from falls? · Stand or sit up slowly  This may help you keep your balance and prevent falls  If you need to get up during the night, sit up first  Be sure you are fully awake before you stand   Turn on the light before you start walking  Go slowly in case you are still sleepy  Make sure you will not trip over any pets sleeping in the bedroom  · Use assistive devices as directed  Your healthcare provider may suggest that you use a cane or walker to help you keep your balance  You may need to have grab bars put in your bathroom near the toilet or in the shower  · Wear shoes that fit well and have soles that   Wear shoes both inside and outside  Use slippers with good   Do not wear shoes with high heels  · Wear a personal alarm  This is a device that allows you to call 911 if you fall and need help  Ask your healthcare provider for more information  · Stay active  Exercise can help strengthen your muscles and improve your balance  Your healthcare provider may recommend water aerobics or walking  He or she may also recommend physical therapy to improve your coordination  Never start an exercise program without talking to your healthcare provider first      · Manage medical conditions  Keep all appointments with your healthcare providers  Visit your eye doctor as directed  How can I make my home safer? · Add items to prevent falls in the bathroom  Put nonslip strips on your bath or shower floor to prevent you from slipping  Use a bath mat if you do not have carpet in the bathroom  This will prevent you from falling when you step out of the bath or shower  Use a shower seat so you do not need to stand while you shower  Sit on the toilet or a chair in your bathroom to dry yourself and put on clothing  This will prevent you from losing your balance from drying or dressing yourself while you are standing  · Keep paths clear  Remove books, shoes, and other objects from walkways and stairs  Place cords for telephones and lamps out of the way so that you do not need to walk over them  Tape them down if you cannot move them  Remove small rugs   If you cannot remove a rug, secure it with double-sided tape  This will prevent you from tripping  · Install bright lights in your home  Use night lights to help light paths to the bathroom or kitchen  Always turn on the light before you start walking  · Keep items you use often on shelves within reach  Do not use a step stool to help you reach an item  · Paint or place reflective tape on the edges of your stairs  This will help you see the stairs better  Call 911 or have someone else call if:   · You have fallen and are unconscious  · You have fallen and cannot move part of your body  Contact your healthcare provider if:   · You have fallen and have pain or a headache  · You have questions or concerns about your condition or care  CARE AGREEMENT:   You have the right to help plan your care  Learn about your health condition and how it may be treated  Discuss treatment options with your caregivers to decide what care you want to receive  You always have the right to refuse treatment  The above information is an  only  It is not intended as medical advice for individual conditions or treatments  Talk to your doctor, nurse or pharmacist before following any medical regimen to see if it is safe and effective for you  © 2017 [facility] Information is for End User's use only and may not be sold, redistributed or otherwise used for commercial purposes  All illustrations and images included in CareNotes® are the copyrighted property of [employer] , [employer]  or Kleber Albert  Advance Directives   WHAT YOU NEED TO KNOW:   What are advance directives? Advance directives are legal documents that state your wishes and plans for medical care  These plans are made ahead of time in case you lose your ability to make decisions for yourself  Advance directives can apply to any medical decision, such as the treatments you want, and if you want to donate organs  What are the types of advance directives?   There are many types of advance directives, and each state has rules about how to use them  You may choose a combination of any of the following:  · Living will: This is a written record of the treatment you want  You can also choose which treatments you do not want, which to limit, and which to stop at a certain time  This includes surgery, medicine, IV fluid, and tube feedings  · Durable power of  for healthcare [facility]): This is a written record that states who you want to make healthcare choices for you when you are unable to make them for yourself  This person, called a proxy, is usually a family member or a friend  You may choose more than 1 proxy  · Do not resuscitate (DNR) order:  A DNR order is used in case your heart stops beating or you stop breathing  It is a request not to have certain forms of treatment, such as CPR  A DNR order may be included in other types of advance directives  · Medical directive: This covers the care that you want if you are in a coma, near death, or unable to make decisions for yourself  You can list the treatments you want for each condition  Treatment may include pain medicine, surgery, blood transfusions, dialysis, IV or tube feedings, and a ventilator (breathing machine)  · Values history: This document has questions about your views, beliefs, and how you feel and think about life  This information can help others choose the care that you would choose  Why are advance directives important? An advance directive helps you control your care  Although spoken wishes may be used, it is better to have your wishes written down  Spoken wishes can be misunderstood, or not followed  Treatments may be given even if you do not want them  An advance directive may make it easier for your family to make difficult choices about your care  How do I decide what to put in my advance directives?    · Make informed decisions:  Make sure you fully understand treatments or care you may receive  Think about the benefits and problems your decisions could cause for you or your family  Talk to healthcare providers if you have concerns or questions before you write down your wishes  You may also want to talk with your [employer] or , or a   Check your state laws to make sure that what you put in your advance directive is legal      · Sign all forms:  Sign and date your advance directive when you have finished  You may also need 2 witnesses to sign the forms  Witnesses cannot be your doctor or his staff, your spouse, heirs or beneficiaries, people you owe money to, or your chosen proxy  Talk to your family, proxy, and healthcare providers about your advance directive  Give each person a copy, and keep one for yourself in a place you can get to easily  Do not keep it hidden or locked away  · Review and revise your plans: You can revise your advance directive at any time, as long as you are able to make decisions  Review your plan every year, and when there are changes in your life, or your health  When you make changes, let your family, proxy, and healthcare providers know  Give each a new copy  Where can I find more information? · [employer]  [address] , [address]  Phone: 6- 371 - [phone]  Phone: 4- 177 - [phone]  Web Address: http://www  aafp org  · [address] [facility])  [address] [facility] [place], [address]  Phone: 4- 517 - [phone]  Phone: [phone]  Web Address: Ricky wise  CARE AGREEMENT:   You have the right to help plan your care  To help with this plan, you must learn about your health condition and treatment options  You must also learn about advance directives and how they are used  Work with your healthcare providers to decide what care will be used to treat you  You always have the right to refuse treatment  The above information is an  only  It is not intended as medical advice for individual conditions or treatments  Talk to your doctor, nurse or pharmacist before following any medical regimen to see if it is safe and effective for you  © 2017 [address] Information is for End User's use only and may not be sold, redistributed or otherwise used for commercial purposes  All illustrations and images included in CareNotes® are the copyrighted property of A D A M , Inc  or Kleber Albert

## 2019-07-10 NOTE — Clinical Note
Hi DrPatient is due for repeat colonoscopy, can your office reach out to him? He also recently had genetic testing and has some markers which put him at higher risk for colon cancer-Dony

## 2019-07-10 NOTE — PROGRESS NOTES
Assessment and Plan:     Problem List Items Addressed This Visit     Beta+ thalassemia, normal Hb A2, type 1, silent ([address] )    Relevant Orders    CBC and differential    Essential hypertension    Relevant Medications    amLODIPine (NORVASC) 10 mg tablet    lisinopril-hydrochlorothiazide (PRINZIDE,ZESTORETIC) 20-12 5 MG per tablet    Other Relevant Orders    Comprehensive metabolic panel    CBC and differential    Monoallelic mutation of OSMANY gene - Primary    Relevant Orders    Ambulatory referral to Urology      Other Visit Diagnoses     Screening for prostate cancer        Relevant Orders    PSA, Total Screen    Encounter for lipid screening for cardiovascular disease        Relevant Orders    Lipid Panel with Direct LDL reflex    Medicare annual wellness visit, subsequent             History of Present Illness:     Patient presents for Medicare Annual Wellness visit    Patient Care Team:  Peng Chau DO as PCP - General (Internal Medicine)  Casey Friedman MD     Problem List:     Patient Active Problem List   Diagnosis    Essential hypertension    Benign colon polyp    Beta+ thalassemia, normal Hb A2, type 1, silent ([address] )    Low vitamin D level    Seasonal allergies    Cervical radiculopathy    Genetic testing    Family history of colon cancer    Monoallelic mutation of OSMANY gene      Past Medical and Surgical History:     Past Medical History:   Diagnosis Date    Anemia 1975    Cervicalgia 10/29/2014    Hypertension 2006    Prostatic hypertrophy     Benign     Past Surgical History:   Procedure Laterality Date    SKIN TAG REMOVAL      VASECTOMY      Vas Deferens      Family History:     Family History   Problem Relation Age of Onset    Thalassemia Mother     Heart Valve Disease Mother         heart valve replaced    Hypertension Father     Stomach cancer Father     Colon cancer Brother 48        mass in colon, suspected cancer-dx'd at 47 y/o    Alcohol abuse Neg Hx     Substance Abuse Neg Hx     Mental illness Neg Hx     Depression Neg Hx       Social History:     Social History     Tobacco Use   Smoking Status Never Smoker   Smokeless Tobacco Never Used     Social History     Substance and Sexual Activity   Alcohol Use Yes    Comment: drinks wine     Social History     Substance and Sexual Activity   Drug Use No      Medications and Allergies:     Current Outpatient Medications   Medication Sig Dispense Refill    amLODIPine (NORVASC) 10 mg tablet Take 1 tablet (10 mg total) by mouth daily 90 tablet 1    Ascorbic Acid (VITAMIN C) 1000 MG tablet Take 2 tablets by mouth daily      aspirin 81 MG tablet Take 1 tablet by mouth daily      B Complex Vitamins (VITAMIN B-COMPLEX 100 IJ) Take by mouth      BETA CAROTENE PO Take by mouth      CHROMIUM PO Take by mouth      coenzyme Q-10 100 MG capsule Take by mouth      Lactobacillus (ACIDOPHILUS PO) Take by mouth      lisinopril-hydrochlorothiazide (PRINZIDE,ZESTORETIC) 20-12 5 MG per tablet Take 2 tablets by mouth daily 180 tablet 1    Multiple Vitamin-Folic Acid TABS Take by mouth      Omega-3 Fatty Acids (FISH OIL) 1,000 mg Take 2 tablets by mouth daily      vitamin E, tocopherol, 400 units capsule Take 400 Units by mouth 2 (two) times a day       No current facility-administered medications for this visit  Allergies   Allergen Reactions    Albuterol Throat Swelling      Immunizations:     Immunization History   Administered Date(s) Administered    INFLUENZA 11/11/2015, 11/11/2015, 10/09/2018    Influenza Split High Dose Preservative Free IM 10/09/2018    Influenza TIV (IM) 11/03/2015, 10/24/2017    Pneumococcal Conjugate 13-Valent 01/23/2018    Pneumococcal Polysaccharide PPV23 01/30/2019    Tdap 09/22/2014    Zoster 02/22/2018      Medicare Screening Tests and Risk Assessments:     Jocelyn Todd is here for his Subsequent Wellness visit    Last Medicare Wellness visit information reviewed, patient interviewed and updates made to the record today  Health Risk Assessment:  Patient rates overall health as excellent  Patient feels that their physical health rating is Same  Eyesight was rated as Same  Hearing was rated as Same  Patient feels that their emotional and mental health rating is Slightly better  Pain experienced by patient in the last 7 days has been None  Patient states that he has experienced no weight loss or gain in last 6 months  Emotional/Mental Health:  Patient has not been feeling nervous/anxious  PHQ-9 Depression Screening:    Frequency of the following problems over the past two weeks:      1  Little interest or pleasure in doing things: 0 - not at all      2  Feeling down, depressed, or hopeless: 0 - not at all  PHQ-2 Score: 0          Broken Bones/Falls: Fall Risk Assessment:    In the past year, patient has experienced: No history of falling in past year          Bladder/Bowel:  Patient has not leaked urine accidently in the last six months  Patient reports no loss of bowel control  Immunizations:  Patient has had a flu vaccination within the last year  Patient has received a pneumonia shot  Patient has received a shingles shot  Home Safety:  Patient does not have trouble with stairs inside or outside of their home  Patient currently reports that there are no safety hazards present in home, working smoke alarms, working carbon monoxide detectors  Preventative Screenings:   prostate cancer screen performed, colon cancer screen completed, cholesterol screen completed, glaucoma eye exam completed,     Nutrition:  Current diet: Regular with servings of the following:    Medications:  Patient is currently taking over-the-counter supplements  Patient is able to manage medications  Lifestyle Choices:  Patient reports no tobacco use  Patient has not smoked or used tobacco in the past   Patient reports alcohol use  Alcohol use per week: 5 drinks  Patient drives a vehicle    Patient wears seat belt     Current level of exercise of physical activity described by patient as: moderate  Activities of Daily Living:  Can get out of bed by his or her self, able to dress self, able to make own meals, able to do own shopping, able to bathe self, can do own laundry/housekeeping, can manage own money, pay bills and track expenses    Previous Hospitalizations:  No hospitalization or ED visit in past 12 months        Advanced Directives:  Patient has decided on a power of   Patient has not spoken to designated power of   Patient has not completed advanced directive          Preventative Screening/Counseling:      Cardiovascular:      General: Screening Current          Diabetes:      General: Screening Current          Colorectal Cancer:      General: Risks and Benefits Discussed      Due for studies: Colonoscopy - High Risk          Prostate Cancer:      General: Risks and Benefits Discussed      Due for labs: PSA          Osteoporosis:      General: Screening Not Indicated          AAA:      General: Screening Not Indicated          Hepatitis C:      General: Screening Current        Immunizations:      Influenza: Influenza UTD This Year      Pneumococcal: Lifetime Vaccine Completed      Shingrix: Vaccine Status Unknown      Zostavax: Zostavax Vaccine UTD      TD: Td Vaccine UTD      TDAP: Tdap Vaccine UTD        Referrals:  Referral(s) to: Urology

## 2019-07-10 NOTE — PROGRESS NOTES
Assessment/Plan:     Diagnoses and all orders for this visit:    Monoallelic mutation of OSMANY gene  Comments:  noted on genetic testing with some increased incidence of certain cancers  pt met with genetic couselor already and is seeing colo-rectal & Urology  Orders:  -     Ambulatory referral to Urology; Future    Essential hypertension  Comments:  Bp stable, c/w ACEI/diuretic/CCB  Orders:  -     amLODIPine (NORVASC) 10 mg tablet; Take 1 tablet (10 mg total) by mouth daily  -     lisinopril-hydrochlorothiazide (PRINZIDE,ZESTORETIC) 20-12 5 MG per tablet; Take 2 tablets by mouth daily  -     Comprehensive metabolic panel; Future  -     CBC and differential; Future    Screening for prostate cancer  -     PSA, Total Screen    Beta+ thalassemia, normal Hb A2, type 1, silent ([facility])  Comments:  stable on most recent BW and pt has no symptoms, re-check CBC in 6 mos  Orders:  -     CBC and differential; Future    Encounter for lipid screening for cardiovascular disease  -     Lipid Panel with Direct LDL reflex; Future    Medicare annual wellness visit, subsequent      Advised Laura Allen to consider avoiding OTC/herbal supplements that are hormone based or have hormone-like activity given his positive genetic screening    BMI Counseling: Body mass index is 28 85 kg/m²  Discussed the patient's BMI with him  The BMI is above average  BMI counseling and education was provided to the patient  Exercise recommendations include exercising 3-5 times per week & he is considering joining a gym near his new home  Subjective:      Patient ID: Marko Noble is a 75 y o  male  HPI    Here for follow up, Laura Allen is overall doing well  He completed genetic testing and is positive for OSMANY and related gene which does put him at increased risk of certain cancers(see report in chart)  He met with genetic counselor and knows he is due for colonoscopy and will try reaching his colo-rectal specialist again to schedule      His divorce is nearly final and he moved into a new place of his own in [place]  He exercises at home, doing push ups and related exercises  He is willing to see urology given his positive genetic screening tests  He enjoys playing [employer] and used to play in a blues band  He denies any other concerns or complaints  Past Medical History:   Diagnosis Date    Anemia 1975    Cervicalgia 10/29/2014    Hypertension 2006    Prostatic hypertrophy     Benign     Vitals:    07/10/19 1125   BP: 132/82   BP Location: Right arm   Patient Position: Sitting   Cuff Size: Standard   Pulse: 57   SpO2: 98%   Weight: 83 6 kg (184 lb 3 2 oz)   Height: 5' 7" (1 702 m)     Body mass index is 28 85 kg/m²  Current Outpatient Medications:     amLODIPine (NORVASC) 10 mg tablet, Take 1 tablet (10 mg total) by mouth daily, Disp: 90 tablet, Rfl: 1    Ascorbic Acid (VITAMIN C) 1000 MG tablet, Take 2 tablets by mouth daily, Disp: , Rfl:     aspirin 81 MG tablet, Take 1 tablet by mouth daily, Disp: , Rfl:     B Complex Vitamins (VITAMIN B-COMPLEX 100 IJ), Take by mouth, Disp: , Rfl:     BETA CAROTENE PO, Take by mouth, Disp: , Rfl:     CHROMIUM PO, Take by mouth, Disp: , Rfl:     coenzyme Q-10 100 MG capsule, Take by mouth, Disp: , Rfl:     Lactobacillus (ACIDOPHILUS PO), Take by mouth, Disp: , Rfl:     lisinopril-hydrochlorothiazide (PRINZIDE,ZESTORETIC) 20-12 5 MG per tablet, Take 2 tablets by mouth daily, Disp: 180 tablet, Rfl: 1    Multiple Vitamin-Folic Acid TABS, Take by mouth, Disp: , Rfl:     Omega-3 Fatty Acids (FISH OIL) 1,000 mg, Take 2 tablets by mouth daily, Disp: , Rfl:     vitamin E, tocopherol, 400 units capsule, Take 400 Units by mouth 2 (two) times a day, Disp: , Rfl:   Allergies   Allergen Reactions    Albuterol Throat Swelling         Review of Systems   Constitutional: Negative for fever  HENT: Negative for congestion  Eyes: Negative for visual disturbance  Respiratory: Negative for shortness of breath  Cardiovascular: Negative for chest pain  Gastrointestinal: Negative for abdominal pain  Endocrine: Negative for polyuria  Genitourinary: Negative for dysuria  Musculoskeletal: Negative for neck pain  Skin: Negative for rash  Allergic/Immunologic: Positive for environmental allergies  Neurological: Negative for headaches  Psychiatric/Behavioral: Negative for dysphoric mood  Objective:      /82 (BP Location: Right arm, Patient Position: Sitting, Cuff Size: Standard)   Pulse 57   Ht 5' 7" (1 702 m)   Wt 83 6 kg (184 lb 3 2 oz)   SpO2 98%   BMI 28 85 kg/m²          Physical Exam   Constitutional: He appears well-developed and well-nourished  HENT:   Head: Normocephalic and atraumatic  Eyes: Pupils are equal, round, and reactive to light  Cardiovascular: Normal rate and regular rhythm  No murmur heard  Pulmonary/Chest: Effort normal and breath sounds normal  He has no wheezes  He has no rales  Abdominal: Soft  Bowel sounds are normal  There is no tenderness  Musculoskeletal: He exhibits no edema  Neurological: He is alert  Psychiatric: He has a normal mood and affect  His behavior is normal    Vitals reviewed        Results for orders placed or performed in visit on 07/09/19   Hepatitis C antibody   Result Value Ref Range    Hepatitis C Ab Non-reactive Non-reactive

## 2019-08-10 ENCOUNTER — ANESTHESIA EVENT (OUTPATIENT)
Dept: GASTROENTEROLOGY | Facility: AMBULARY SURGERY CENTER | Age: 64
End: 2019-08-10

## 2019-08-23 ENCOUNTER — ANESTHESIA (OUTPATIENT)
Dept: GASTROENTEROLOGY | Facility: AMBULARY SURGERY CENTER | Age: 64
End: 2019-08-23

## 2019-08-23 ENCOUNTER — HOSPITAL ENCOUNTER (OUTPATIENT)
Dept: GASTROENTEROLOGY | Facility: AMBULARY SURGERY CENTER | Age: 64
Setting detail: OUTPATIENT SURGERY
Discharge: HOME | End: 2019-08-23
Attending: COLON & RECTAL SURGERY | Admitting: COLON & RECTAL SURGERY
Payer: MEDICARE

## 2019-08-23 VITALS
BODY MASS INDEX: 28.09 KG/M2 | WEIGHT: 179 LBS | DIASTOLIC BLOOD PRESSURE: 75 MMHG | SYSTOLIC BLOOD PRESSURE: 134 MMHG | RESPIRATION RATE: 18 BRPM | TEMPERATURE: 97.8 F | HEART RATE: 54 BPM | HEIGHT: 67 IN | OXYGEN SATURATION: 96 %

## 2019-08-23 DIAGNOSIS — Z80.0 FAMILY HISTORY OF COLON CANCER: ICD-10-CM

## 2019-08-23 PROCEDURE — 1124F ACP DISCUSS-NO DSCNMKR DOCD: CPT | Performed by: PATHOLOGY

## 2019-08-23 PROCEDURE — 88305 TISSUE EXAM BY PATHOLOGIST: CPT | Performed by: PATHOLOGY

## 2019-08-23 PROCEDURE — 99213 OFFICE O/P EST LOW 20 MIN: CPT | Performed by: COLON & RECTAL SURGERY

## 2019-08-23 PROCEDURE — 45384 COLONOSCOPY W/LESION REMOVAL: CPT | Performed by: COLON & RECTAL SURGERY

## 2019-08-23 RX ORDER — LIDOCAINE HYDROCHLORIDE 10 MG/ML
0.5 INJECTION, SOLUTION EPIDURAL; INFILTRATION; INTRACAUDAL; PERINEURAL ONCE AS NEEDED
Status: DISCONTINUED | OUTPATIENT
Start: 2019-08-23 | End: 2019-08-27 | Stop reason: HOSPADM

## 2019-08-23 RX ORDER — PROPOFOL 10 MG/ML
INJECTION, EMULSION INTRAVENOUS AS NEEDED
Status: DISCONTINUED | OUTPATIENT
Start: 2019-08-23 | End: 2019-08-23 | Stop reason: SURG

## 2019-08-23 RX ORDER — GLYCOPYRROLATE 0.2 MG/ML
INJECTION INTRAMUSCULAR; INTRAVENOUS AS NEEDED
Status: DISCONTINUED | OUTPATIENT
Start: 2019-08-23 | End: 2019-08-23 | Stop reason: SURG

## 2019-08-23 RX ORDER — SODIUM CHLORIDE, SODIUM LACTATE, POTASSIUM CHLORIDE, CALCIUM CHLORIDE 600; 310; 30; 20 MG/100ML; MG/100ML; MG/100ML; MG/100ML
125 INJECTION, SOLUTION INTRAVENOUS CONTINUOUS
Status: DISCONTINUED | OUTPATIENT
Start: 2019-08-23 | End: 2019-08-27 | Stop reason: HOSPADM

## 2019-08-23 RX ADMIN — PROPOFOL 50 MG: 10 INJECTION, EMULSION INTRAVENOUS at 11:29

## 2019-08-23 RX ADMIN — PROPOFOL 50 MG: 10 INJECTION, EMULSION INTRAVENOUS at 11:17

## 2019-08-23 RX ADMIN — PROPOFOL 20 MG: 10 INJECTION, EMULSION INTRAVENOUS at 11:14

## 2019-08-23 RX ADMIN — PROPOFOL 80 MG: 10 INJECTION, EMULSION INTRAVENOUS at 11:11

## 2019-08-23 RX ADMIN — GLYCOPYRROLATE 0.1 MG: 0.2 INJECTION, SOLUTION INTRAMUSCULAR; INTRAVENOUS at 11:11

## 2019-08-23 RX ADMIN — SODIUM CHLORIDE, SODIUM LACTATE, POTASSIUM CHLORIDE, AND CALCIUM CHLORIDE: .6; .31; .03; .02 INJECTION, SOLUTION INTRAVENOUS at 11:00

## 2019-08-23 RX ADMIN — PROPOFOL 50 MG: 10 INJECTION, EMULSION INTRAVENOUS at 11:20

## 2019-08-23 RX ADMIN — PROPOFOL 50 MG: 10 INJECTION, EMULSION INTRAVENOUS at 11:24

## 2019-08-23 NOTE — ANESTHESIA PREPROCEDURE EVALUATION
Review of Systems/Medical History  Patient summary reviewed  Chart reviewed  No history of anesthetic complications     Cardiovascular  Hypertension controlled,    Pulmonary       GI/Hepatic    Bowel prep            Endo/Other     GYN       Hematology  Anemia ,     Musculoskeletal       Neurology   Psychology           Physical Exam    Airway    Mallampati score: I  TM Distance: >3 FB  Neck ROM: full     Dental   No notable dental hx     Cardiovascular      Pulmonary      Other Findings        Anesthesia Plan  ASA Score- 2     Anesthesia Type- IV sedation with anesthesia with ASA Monitors  Additional Monitors:   Airway Plan:         Plan Factors-  Patient did not smoke on day of surgery  Induction-     Postoperative Plan-     Informed Consent- Anesthetic plan and risks discussed with patient  I personally reviewed this patient with the CRNA  Discussed and agreed on the Anesthesia Plan with the CRNA  Miguel Angel Shin

## 2019-09-11 ENCOUNTER — CONSULT (OUTPATIENT)
Dept: UROLOGY | Facility: CLINIC | Age: 64
End: 2019-09-11
Payer: MEDICARE

## 2019-09-11 VITALS
HEART RATE: 60 BPM | BODY MASS INDEX: 29.44 KG/M2 | HEIGHT: 67 IN | SYSTOLIC BLOOD PRESSURE: 114 MMHG | WEIGHT: 187.6 LBS | DIASTOLIC BLOOD PRESSURE: 76 MMHG

## 2019-09-11 DIAGNOSIS — Z15.89 MONOALLELIC MUTATION OF ATM GENE: ICD-10-CM

## 2019-09-11 DIAGNOSIS — Z15.09 MONOALLELIC MUTATION OF ATM GENE: ICD-10-CM

## 2019-09-11 DIAGNOSIS — Z15.01 MONOALLELIC MUTATION OF ATM GENE: ICD-10-CM

## 2019-09-11 DIAGNOSIS — Z12.5 SCREENING FOR PROSTATE CANCER: Primary | ICD-10-CM

## 2019-09-11 PROCEDURE — 99204 OFFICE O/P NEW MOD 45 MIN: CPT | Performed by: UROLOGY

## 2019-09-11 NOTE — PROGRESS NOTES
Referring Physician: Ventura Caceres DO  A copy of this note was sent to the referring physician  Diagnoses and all orders for this visit:    Screening for prostate cancer  -     PSA, Total Screen; Future  -     PSA, Total Screen; Future    Monoallelic mutation of OSMANY gene  Comments:  noted on genetic testing with some increased incidence of certain cancers  pt met with genetic couselor already and is seeing colo-rectal & Urology  Orders:  -     Ambulatory referral to Urology  -     PSA, Total Screen; Future  -     PSA, Total Screen; Future            Assessment and plan:       1  Prostate cancer screening  -patient is a carrier of a germ line notation of the OSMANY gene which has been associated with hereditary prostate cancer  -current negative rectal examination  - due for PSA    We discussed the role of PSA as a screening biomarker for prostate cancer, including the benefits and current controversies  We discussed that prostate cancer is the most common non-skin  cancer in men in the [place], and the second leading cause of cancer death in men  One in six men will be diagnosed with prostate cancer during his lifetime  Over 30,000 men die each year from prostate cancer; however, early detection may save lives  A variety of factors can affect PSA levels and should be considered in the interpretation of results  The three most common prostate diseases- prostatitis, benign prostatic hyperplasia (BPH), and prostate cancer-may cause elevated PSA levels in the blood  Men choosing to undergo PSA testing should know that some important factors may influence results  - Change in PSA levels over time, known as PSA velocity, is used to assess both cancer risk and aggressiveness  - Blood PSA levels tend to increase with age  - Larger prostates produce larger amounts of PSA       Given the patient's genetic profile my recommendation is for continued prostate cancer screening with a rectal exam and PSA on an annual basis    He will go for PSA in the near future  He will be contacted for results are abnormal   Otherwise we will see him back in 1 year for a rectal exam with additional PSA prior to the visit            Delores Reddy MD      Chief Complaint     Prostate cancer screening,      History of Present Illness     Tisha Powell is a 75 y o  male presents for prostate cancer screening  Patient was found on genetic testing be to carrier of a OSMANY germline mutation  Patient's brother, he is 3 of 11, was diagnosed with early onset advanced colon cancer at age 48  This was detected on screening colonoscopy  He has been treated surgically at this point  His brother underwent genetic testing was found to be a carrier angio therefore underwent genetic testing as well  He to was found to be a carrier of this germ line mutation  The OSMANY gene has been linked to hereditary prostate cancer  Patient has no familial history of prostate cancer itself  He has no lower urinary tract symptoms  He has not had a PSA test in 3 years  Detailed Urologic History     - please refer to HPI    Review of Systems     Review of Systems   Constitutional: Negative for activity change and fatigue  HENT: Negative for congestion  Eyes: Negative for visual disturbance  Respiratory: Negative for shortness of breath and wheezing  Cardiovascular: Negative for chest pain and leg swelling  Gastrointestinal: Negative for abdominal pain  Genitourinary: Negative for dysuria, flank pain, hematuria and urgency  Musculoskeletal: Negative for back pain  Allergic/Immunologic: Negative for immunocompromised state  Neurological: Negative for dizziness and numbness  Psychiatric/Behavioral: Negative for dysphoric mood  All other systems reviewed and are negative              Allergies     Allergies   Allergen Reactions    Albuterol Throat Swelling       Physical Exam     Physical Exam   Constitutional: He is oriented to person, place, and time  He appears well-developed and well-nourished  No distress  HENT:   Head: Normocephalic and atraumatic  Eyes: EOM are normal    Neck: Normal range of motion  Cardiovascular:   Negative lower extremity edema   Pulmonary/Chest: Effort normal and breath sounds normal    Abdominal: Soft  Genitourinary:   Genitourinary Comments: Rectal exam reveals a 40 g prostate smooth no nodules or induration   Musculoskeletal: Normal range of motion  Neurological: He is alert and oriented to person, place, and time  Skin: Skin is warm  Psychiatric: He has a normal mood and affect   His behavior is normal            Vital Signs  Vitals:    09/11/19 1109   BP: 114/76   BP Location: Right arm   Patient Position: Sitting   Cuff Size: Adult   Pulse: 60   Weight: 85 1 kg (187 lb 9 6 oz)   Height: 5' 7" (1 702 m)         Current Medications       Current Outpatient Medications:     amLODIPine (NORVASC) 10 mg tablet, Take 1 tablet (10 mg total) by mouth daily, Disp: 90 tablet, Rfl: 1    Ascorbic Acid (VITAMIN C) 1000 MG tablet, Take 2 tablets by mouth daily, Disp: , Rfl:     aspirin 81 MG tablet, Take 1 tablet by mouth daily, Disp: , Rfl:     B Complex Vitamins (VITAMIN B-COMPLEX 100 IJ), Take by mouth, Disp: , Rfl:     BETA CAROTENE PO, Take by mouth, Disp: , Rfl:     CHROMIUM PO, Take by mouth, Disp: , Rfl:     coenzyme Q-10 100 MG capsule, Take by mouth, Disp: , Rfl:     Lactobacillus (ACIDOPHILUS PO), Take by mouth, Disp: , Rfl:     lisinopril-hydrochlorothiazide (PRINZIDE,ZESTORETIC) 20-12 5 MG per tablet, Take 2 tablets by mouth daily, Disp: 180 tablet, Rfl: 1    Multiple Vitamin-Folic Acid TABS, Take by mouth, Disp: , Rfl:     Omega-3 Fatty Acids (FISH OIL) 1,000 mg, Take 2 tablets by mouth daily, Disp: , Rfl:     vitamin E, tocopherol, 400 units capsule, Take 400 Units by mouth 2 (two) times a day, Disp: , Rfl:       Active Problems     Patient Active Problem List   Diagnosis    Essential hypertension    Benign colon polyp    Beta+ thalassemia, normal Hb A2, type 1, silent ([facility]CC)    Low vitamin D level    Seasonal allergies    Cervical radiculopathy    Genetic testing    Family history of colon cancer    Monoallelic mutation of OSMANY gene    Screening for prostate cancer         Past Medical History     Past Medical History:   Diagnosis Date    Anemia 1975    Cervicalgia 10/29/2014    Colon polyp     Hypertension 2006    Prostatic hypertrophy     Benign         Surgical History     Past Surgical History:   Procedure Laterality Date    COLONOSCOPY      SKIN TAG REMOVAL      VASECTOMY      Vas Deferens         Family History     Family History   Problem Relation Age of Onset    Thalassemia Mother     Heart Valve Disease Mother         heart valve replaced    Hypertension Father     Stomach cancer Father     Colon cancer Brother 48        mass in colon, suspected cancer-dx'd at 47 y/o    Alcohol abuse Neg Hx     Substance Abuse Neg Hx     Mental illness Neg Hx     Depression Neg Hx          Social History     Social History     Social History     Tobacco Use   Smoking Status Never Smoker   Smokeless Tobacco Never Used         Pertinent Lab Values     Lab Results   Component Value Date    CREATININE 1 00 01/03/2019       Lab Results   Component Value Date    PSA 1 6 04/12/2016       @RESULTRCNT(1H])@      Pertinent Imaging      - n/a    Portions of the record may have been created with voice recognition software   Occasional wrong word or "sound a like" substitutions may have occurred due to the inherent limitations of voice recognition software   Read the chart carefully and recognize, using context, where substitutions have occurred

## 2019-09-25 ENCOUNTER — APPOINTMENT (OUTPATIENT)
Dept: LAB | Facility: CLINIC | Age: 64
End: 2019-09-25
Payer: MEDICARE

## 2019-09-25 DIAGNOSIS — Z12.5 SCREENING FOR PROSTATE CANCER: ICD-10-CM

## 2019-09-25 DIAGNOSIS — Z15.89 MONOALLELIC MUTATION OF ATM GENE: ICD-10-CM

## 2019-09-25 DIAGNOSIS — Z15.09 MONOALLELIC MUTATION OF ATM GENE: ICD-10-CM

## 2019-09-25 DIAGNOSIS — Z15.01 MONOALLELIC MUTATION OF ATM GENE: ICD-10-CM

## 2019-09-25 LAB — PSA SERPL-MCNC: 2.1 NG/ML (ref 0–4)

## 2019-09-25 PROCEDURE — G0103 PSA SCREENING: HCPCS

## 2019-09-25 PROCEDURE — 36415 COLL VENOUS BLD VENIPUNCTURE: CPT

## 2020-01-07 ENCOUNTER — APPOINTMENT (OUTPATIENT)
Dept: LAB | Facility: CLINIC | Age: 65
End: 2020-01-07
Payer: MEDICARE

## 2020-01-07 DIAGNOSIS — D56.8: ICD-10-CM

## 2020-01-07 DIAGNOSIS — Z13.6 ENCOUNTER FOR LIPID SCREENING FOR CARDIOVASCULAR DISEASE: ICD-10-CM

## 2020-01-07 DIAGNOSIS — I10 ESSENTIAL HYPERTENSION: ICD-10-CM

## 2020-01-07 DIAGNOSIS — Z13.220 ENCOUNTER FOR LIPID SCREENING FOR CARDIOVASCULAR DISEASE: ICD-10-CM

## 2020-01-07 LAB
ALBUMIN SERPL BCP-MCNC: 3.9 G/DL (ref 3.5–5)
ALP SERPL-CCNC: 64 U/L (ref 46–116)
ALT SERPL W P-5'-P-CCNC: 53 U/L (ref 12–78)
ANION GAP SERPL CALCULATED.3IONS-SCNC: 7 MMOL/L (ref 4–13)
AST SERPL W P-5'-P-CCNC: 16 U/L (ref 5–45)
BASOPHILS # BLD AUTO: 0.05 THOUSANDS/ΜL (ref 0–0.1)
BASOPHILS NFR BLD AUTO: 1 % (ref 0–1)
BILIRUB SERPL-MCNC: 1.05 MG/DL (ref 0.2–1)
BUN SERPL-MCNC: 21 MG/DL (ref 5–25)
CALCIUM SERPL-MCNC: 9 MG/DL (ref 8.3–10.1)
CHLORIDE SERPL-SCNC: 104 MMOL/L (ref 100–108)
CHOLEST SERPL-MCNC: 159 MG/DL (ref 50–200)
CO2 SERPL-SCNC: 31 MMOL/L (ref 21–32)
CREAT SERPL-MCNC: 1.19 MG/DL (ref 0.6–1.3)
EOSINOPHIL # BLD AUTO: 0.44 THOUSAND/ΜL (ref 0–0.61)
EOSINOPHIL NFR BLD AUTO: 8 % (ref 0–6)
ERYTHROCYTE [DISTWIDTH] IN BLOOD BY AUTOMATED COUNT: 17.3 % (ref 11.6–15.1)
GFR SERPL CREATININE-BSD FRML MDRD: 63 ML/MIN/1.73SQ M
GLUCOSE P FAST SERPL-MCNC: 85 MG/DL (ref 65–99)
HCT VFR BLD AUTO: 37.6 % (ref 36.5–49.3)
HDLC SERPL-MCNC: 41 MG/DL
HGB BLD-MCNC: 11.7 G/DL (ref 12–17)
IMM GRANULOCYTES # BLD AUTO: 0.03 THOUSAND/UL (ref 0–0.2)
IMM GRANULOCYTES NFR BLD AUTO: 1 % (ref 0–2)
LDLC SERPL CALC-MCNC: 97 MG/DL (ref 0–100)
LYMPHOCYTES # BLD AUTO: 0.78 THOUSANDS/ΜL (ref 0.6–4.47)
LYMPHOCYTES NFR BLD AUTO: 14 % (ref 14–44)
MCH RBC QN AUTO: 19.7 PG (ref 26.8–34.3)
MCHC RBC AUTO-ENTMCNC: 31.1 G/DL (ref 31.4–37.4)
MCV RBC AUTO: 63 FL (ref 82–98)
MONOCYTES # BLD AUTO: 0.6 THOUSAND/ΜL (ref 0.17–1.22)
MONOCYTES NFR BLD AUTO: 11 % (ref 4–12)
NEUTROPHILS # BLD AUTO: 3.77 THOUSANDS/ΜL (ref 1.85–7.62)
NEUTS SEG NFR BLD AUTO: 65 % (ref 43–75)
NRBC BLD AUTO-RTO: 0 /100 WBCS
PLATELET # BLD AUTO: 323 THOUSANDS/UL (ref 149–390)
PMV BLD AUTO: 11.5 FL (ref 8.9–12.7)
POTASSIUM SERPL-SCNC: 3.4 MMOL/L (ref 3.5–5.3)
PROT SERPL-MCNC: 7.6 G/DL (ref 6.4–8.2)
RBC # BLD AUTO: 5.94 MILLION/UL (ref 3.88–5.62)
SODIUM SERPL-SCNC: 142 MMOL/L (ref 136–145)
TRIGL SERPL-MCNC: 105 MG/DL
WBC # BLD AUTO: 5.67 THOUSAND/UL (ref 4.31–10.16)

## 2020-01-07 PROCEDURE — 80053 COMPREHEN METABOLIC PANEL: CPT

## 2020-01-07 PROCEDURE — 36415 COLL VENOUS BLD VENIPUNCTURE: CPT

## 2020-01-07 PROCEDURE — 85025 COMPLETE CBC W/AUTO DIFF WBC: CPT

## 2020-01-07 PROCEDURE — 80061 LIPID PANEL: CPT

## 2020-01-15 ENCOUNTER — OFFICE VISIT (OUTPATIENT)
Dept: INTERNAL MEDICINE CLINIC | Facility: CLINIC | Age: 65
End: 2020-01-15
Payer: MEDICARE

## 2020-01-15 VITALS
SYSTOLIC BLOOD PRESSURE: 112 MMHG | TEMPERATURE: 98.4 F | HEART RATE: 68 BPM | WEIGHT: 186.4 LBS | DIASTOLIC BLOOD PRESSURE: 68 MMHG | BODY MASS INDEX: 29.26 KG/M2 | OXYGEN SATURATION: 97 % | HEIGHT: 67 IN

## 2020-01-15 DIAGNOSIS — Z15.09 MONOALLELIC MUTATION OF ATM GENE: ICD-10-CM

## 2020-01-15 DIAGNOSIS — Z15.01 MONOALLELIC MUTATION OF ATM GENE: ICD-10-CM

## 2020-01-15 DIAGNOSIS — I10 ESSENTIAL HYPERTENSION: Primary | ICD-10-CM

## 2020-01-15 DIAGNOSIS — R17 ELEVATED BILIRUBIN: ICD-10-CM

## 2020-01-15 DIAGNOSIS — Z15.89 MONOALLELIC MUTATION OF ATM GENE: ICD-10-CM

## 2020-01-15 DIAGNOSIS — D56.8: ICD-10-CM

## 2020-01-15 PROCEDURE — 99214 OFFICE O/P EST MOD 30 MIN: CPT | Performed by: INTERNAL MEDICINE

## 2020-01-15 RX ORDER — LISINOPRIL AND HYDROCHLOROTHIAZIDE 20; 12.5 MG/1; MG/1
2 TABLET ORAL DAILY
Qty: 180 TABLET | Refills: 1 | Status: SHIPPED | OUTPATIENT
Start: 2020-01-15 | End: 2020-07-23

## 2020-01-15 RX ORDER — AMLODIPINE BESYLATE 10 MG/1
10 TABLET ORAL DAILY
Qty: 90 TABLET | Refills: 1 | Status: SHIPPED | OUTPATIENT
Start: 2020-01-15 | End: 2020-02-26 | Stop reason: SDUPTHER

## 2020-01-15 NOTE — PATIENT INSTRUCTIONS
1  Medications re-ordered  2  Return in 6 months  3  Banana per day  4   Blood work in fall -> NO fasting

## 2020-01-15 NOTE — Clinical Note
Eliecer Pfeiffer saw Nabil Hollis last year for genetic counseling   his [facility] test results(scanned into [employer]) state there is some correlation with his genetic mutation & melanoma risk  Should he be seeing a dermatologist for skin checks? Thank Maximus Mccallum

## 2020-01-15 NOTE — PROGRESS NOTES
Assessment/Plan:     Diagnoses and all orders for this visit:    Essential hypertension  Comments:  Bp stable, c/w ACEI/diuretic/CCB  Orders:  -     lisinopril-hydrochlorothiazide (PRINZIDE,ZESTORETIC) 20-12 5 MG per tablet; Take 2 tablets by mouth daily  -     amLODIPine (NORVASC) 10 mg tablet; Take 1 tablet (10 mg total) by mouth daily    Beta+ thalassemia, normal Hb A2, type 1, silent (HCC)  Comments:  blood count stable, s/p heme eval in past   Re-check CBC every 12 mos    Elevated bilirubin  Comments:  noted on previous BW(fasting)  re-check non-fasting bilirubin with next labs  Orders:  -     Bilirubin, Total and Direct; Future    Monoallelic mutation of OSMANY gene  Comments:  UTD on colonoscopy & seeing urology for ongoing care          Subjective:      Patient ID: Robert Walton is a 77 y o  male  HPI    Here for follow up, overall doing well  Taking BP medications as prescribed and recently completed fasting BW which was reviewed today  Pa Quinteros eats an apple per day but no banana  Joyb exercises regularly including push ups  His neck pain is much better than before has occ tingling in arm full rotation to the left  ROS otherwise negative, no other complaints  Past Medical History:   Diagnosis Date    Anemia 1975    Cervicalgia 10/29/2014    Colon polyp     Hypertension 2006    Prostatic hypertrophy     Benign     Vitals:    01/15/20 1134   BP: 112/68   BP Location: Right arm   Patient Position: Sitting   Cuff Size: Standard   Pulse: 68   Temp: 98 4 °F (36 9 °C)   SpO2: 97%   Weight: 84 6 kg (186 lb 6 4 oz)   Height: 5' 7" (1 702 m)     Body mass index is 29 19 kg/m²      Current Outpatient Medications:     amLODIPine (NORVASC) 10 mg tablet, Take 1 tablet (10 mg total) by mouth daily, Disp: 90 tablet, Rfl: 1    Ascorbic Acid (VITAMIN C) 1000 MG tablet, Take 2 tablets by mouth daily, Disp: , Rfl:     aspirin 81 MG tablet, Take 1 tablet by mouth daily, Disp: , Rfl:     B Complex Vitamins (VITAMIN B-COMPLEX 100 IJ), Take by mouth, Disp: , Rfl:     BETA CAROTENE PO, Take by mouth, Disp: , Rfl:     CHROMIUM PO, Take by mouth, Disp: , Rfl:     coenzyme Q-10 100 MG capsule, Take by mouth, Disp: , Rfl:     Lactobacillus (ACIDOPHILUS PO), Take by mouth, Disp: , Rfl:     lisinopril-hydrochlorothiazide (PRINZIDE,ZESTORETIC) 20-12 5 MG per tablet, Take 2 tablets by mouth daily, Disp: 180 tablet, Rfl: 1    Multiple Vitamin-Folic Acid TABS, Take by mouth, Disp: , Rfl:     Omega-3 Fatty Acids (FISH OIL) 1,000 mg, Take 2 tablets by mouth daily, Disp: , Rfl:     vitamin E, tocopherol, 400 units capsule, Take 400 Units by mouth 2 (two) times a day, Disp: , Rfl:   Allergies   Allergen Reactions    Albuterol Throat Swelling         Review of Systems   Constitutional: Negative for fever  HENT: Negative for congestion  Eyes: Negative for visual disturbance  Respiratory: Negative for shortness of breath  Cardiovascular: Negative for chest pain  Gastrointestinal: Negative for abdominal pain  Endocrine: Negative for polyuria  Genitourinary: Negative for dysuria  Musculoskeletal: Negative for neck pain  Skin: Negative for rash  Allergic/Immunologic: Negative for immunocompromised state  Neurological: Negative for headaches  Psychiatric/Behavioral: Negative for dysphoric mood  Objective:      /68 (BP Location: Right arm, Patient Position: Sitting, Cuff Size: Standard)   Pulse 68   Temp 98 4 °F (36 9 °C)   Ht 5' 7" (1 702 m)   Wt 84 6 kg (186 lb 6 4 oz)   SpO2 97%   BMI 29 19 kg/m²          Physical Exam   Constitutional: He appears well-developed and well-nourished  HENT:   Head: Normocephalic and atraumatic  Eyes: Pupils are equal, round, and reactive to light  Cardiovascular: Normal rate and regular rhythm  No murmur heard  Pulmonary/Chest: Effort normal and breath sounds normal  He has no wheezes  He has no rales  Abdominal: Soft   Bowel sounds are normal  There is no tenderness  Musculoskeletal: He exhibits no edema  Neurological: He is alert  Psychiatric: He has a normal mood and affect  His behavior is normal    Vitals reviewed        Results for orders placed or performed in visit on 01/07/20   Comprehensive metabolic panel   Result Value Ref Range    Sodium 142 136 - 145 mmol/L    Potassium 3 4 (L) 3 5 - 5 3 mmol/L    Chloride 104 100 - 108 mmol/L    CO2 31 21 - 32 mmol/L    ANION GAP 7 4 - 13 mmol/L    BUN 21 5 - 25 mg/dL    Creatinine 1 19 0 60 - 1 30 mg/dL    Glucose, Fasting 85 65 - 99 mg/dL    Calcium 9 0 8 3 - 10 1 mg/dL    AST 16 5 - 45 U/L    ALT 53 12 - 78 U/L    Alkaline Phosphatase 64 46 - 116 U/L    Total Protein 7 6 6 4 - 8 2 g/dL    Albumin 3 9 3 5 - 5 0 g/dL    Total Bilirubin 1 05 (H) 0 20 - 1 00 mg/dL    eGFR 63 ml/min/1 73sq m   CBC and differential   Result Value Ref Range    WBC 5 67 4 31 - 10 16 Thousand/uL    RBC 5 94 (H) 3 88 - 5 62 Million/uL    Hemoglobin 11 7 (L) 12 0 - 17 0 g/dL    Hematocrit 37 6 36 5 - 49 3 %    MCV 63 (L) 82 - 98 fL    MCH 19 7 (L) 26 8 - 34 3 pg    MCHC 31 1 (L) 31 4 - 37 4 g/dL    RDW 17 3 (H) 11 6 - 15 1 %    MPV 11 5 8 9 - 12 7 fL    Platelets 327 547 - 619 Thousands/uL    nRBC 0 /100 WBCs    Neutrophils Relative 65 43 - 75 %    Immat GRANS % 1 0 - 2 %    Lymphocytes Relative 14 14 - 44 %    Monocytes Relative 11 4 - 12 %    Eosinophils Relative 8 (H) 0 - 6 %    Basophils Relative 1 0 - 1 %    Neutrophils Absolute 3 77 1 85 - 7 62 Thousands/µL    Immature Grans Absolute 0 03 0 00 - 0 20 Thousand/uL    Lymphocytes Absolute 0 78 0 60 - 4 47 Thousands/µL    Monocytes Absolute 0 60 0 17 - 1 22 Thousand/µL    Eosinophils Absolute 0 44 0 00 - 0 61 Thousand/µL    Basophils Absolute 0 05 0 00 - 0 10 Thousands/µL   Lipid Panel with Direct LDL reflex   Result Value Ref Range    Cholesterol 159 50 - 200 mg/dL    Triglycerides 105 <=150 mg/dL    HDL, Direct 41 >=40 mg/dL    LDL Calculated 97 0 - 100 mg/dL

## 2020-01-23 ENCOUNTER — TELEPHONE (OUTPATIENT)
Dept: GYNECOLOGIC ONCOLOGY | Facility: CLINIC | Age: 65
End: 2020-01-23

## 2020-01-23 NOTE — TELEPHONE ENCOUNTER
Message left for patient yesterday regarding scheduling genetic counseling visit with either Sandi genetic counselor or Gila Wagner genetic counselor, Gayle Rodriguez  Awaiting return call

## 2020-02-21 ENCOUNTER — TELEPHONE (OUTPATIENT)
Dept: INTERNAL MEDICINE CLINIC | Facility: CLINIC | Age: 65
End: 2020-02-21

## 2020-02-21 NOTE — TELEPHONE ENCOUNTER
LEFT MESSAGE INFORMING PT OF THIS, I GAVE HIM RAHEEM GUERRA'S NAME AND PHONE NUMBER SO HE CAN CALL TO SCHEDULE AN APPT AND TOLD HIM TO CALL ME BACK IF HE HAS ANY QUESTIONS

## 2020-02-21 NOTE — TELEPHONE ENCOUNTER
Patient called back and is scheduled with Venus Chavarria on 2/25/20 at [facility]

## 2020-02-21 NOTE — TELEPHONE ENCOUNTER
----- Message from Sergio Reese DO sent at 2/21/2020  7:56 AM EST -----  Let Samuel Escobar know    He had genetic testing last year but did not meet genetic counselor at that time(met a member of cancer care team)    He does not need add'l BW or testing but should meet [facility] genetic counselor -> Reema to review the results in detail    There is usually no co-pay for this visit  He should call [facility] oncology office to schedule with 'genetic counselor'    Thank you  ----- Message -----  From: COOPER Carpenter  Sent: 1/28/2020   8:57 AM EST  To: Sergio Reese DO, Corrine Fillman, MS    He did not have the formal counseling after the positive test result, since I am not a counselor  I just heidi his blood and sent it for processing and then informed him of the genetic mutation  After the results come back, I set up those patients with a positive result for genetic counseling -- at the time with [facility]  He had wanted at the time to schedule on his own which it appears he did not        ----- Message -----  From: Sergio Reese DO  Sent: 1/27/2020   3:39 PM EST  To: COOPER Carpenter, Valdez Cardona, MS Gonzáles    I'm not sure if Samuel Escobar will want to make another appointment   believe he was already seen last year for genetic counseling    Sounds like the guidelines are a bit vague when it comes to melanoma and this mutation? Sergio Reese DO  ----- Message -----  From: COOPER Carpenter  Sent: 1/27/2020  10:14 AM EST  To: Sergio Reese DO, Corrine Fillman, MS    I left the patient a message to call back to schedule and have not heard back at this time  ----- Message -----  From: Valdez Cardona MS  Sent: 1/27/2020   9:11 AM EST  To: Candy Solomon Dr and Liliana Farley,    It is best if I see this patient and collect a detailed family history before I make recommendations regarding screening      The cancers known to be associated with pathogenic variants in the OSMANY gene are female breast cancer, pancreatic, and prostate  Other cancers such as colorectal and melanoma are only possibly associated  Therefore the exact lifetime risks for colorectal and/or melanoma are not known and there are no guidelines for management  That being said if he has a personal and/or family history of melanoma I would recommend yearly skin examinations for him anyway  As an FYI: If you choose the following referral in [facility] "Ambulatory Referral to Oncology Genetics", this will prompt the [facility] to reach out to the patient and schedule an appointment  They will try to get in touch with the patient 3 times  I hope this makes sense and please let me know if there are any other questions  Thanks    Reema     ----- Message -----  From: Essence Mendoza DO  Sent: 1/22/2020  11:50 AM EST  To: COOPER Mason, Reema Massey MS    Hi    Haven't heard back yet    Let me know what I should do next    Thank you  ----- Message -----  From: COOPER Mason  Sent: 1/15/2020   2:16 PM EST  To: Essence Mendoza DO, Reema Wen MS    I'm not sure of the percentage risk for melanoma -- I will have to defer to Radha Avila, our new genetic counselor  Reema -- would you be able to see this patient for counseling? I drew his blood work for the genetic testing, but am unfortunately not a genetic counselor  At the time, I had offered to set him up for a phone consult with the [facility] genetic counselor for full review of his results/risk management, but per my note he preferred to take the number and call at a later time  ----- Message -----  From: Essence Mendoza DO  Sent: 1/15/2020  12:37 PM EST  To: COOPER Mason    You saw Carlos Ferro last year for genetic counseling   his [facility] test results(scanned into Media) state there is some correlation with his genetic mutation & melanoma risk  Should he be seeing a dermatologist for skin checks?     Thank you    Essence Mendoza DO

## 2020-02-24 ENCOUNTER — TELEPHONE (OUTPATIENT)
Dept: HEMATOLOGY ONCOLOGY | Facility: CLINIC | Age: 65
End: 2020-02-24

## 2020-02-24 NOTE — PROGRESS NOTES
Post-Test Genetic Counseling Consult Note    Patient Name: Adama Hill   /Age: 1955/65 y o  Referring Provider: Marino Baer DO     Date of Service: 2020  Genetic Counselor: Floresita Luna [facility]    Interpretation Services: None    Service: Type: In-person consult at [facility] of Visit: 61 minutes      Taurus Hogan was referred to the [address] [place] [address] and Genetic Assessment Program due to his personal and family history of an OSMANY pathogenic variant  TEST SUMMARY:  Walt Mendiola was tested with [facility]'s Colorectal Cancer Guidelines-Based Panel on 2019  His test was ordered by Ventura Overton and Dr Diana Nolen  Test(s): [facility]'s Colorectal Cancer Guidelines-Based Panel (20 genes): APC, OSMANY, AXIN2, BMPR1A, CHEK2, EPCAM, GREM1, MLH1, MSH2, MSH3, MSH6, MUTYH, NTHL1, PMS2, POLD1, POLE, PTEN, SMAD4, STK11 and TP53      Result: Positive - Pathogenic variant identified in the OSMANY gene and a Variant of uncertain significance identified in the MSH6 gene  Variant 1*  OSMANY c 2502dup (p [id number]); heterozygous; pathogenic variant    Variant 2*  MSH6 c 1844G>T (p [id number]); heterozygous; variant uncertain significance     *See Assessment section below for more details     Cancer and Treatment History: No personal history of cancer     Cancer Screening:     Colon  19 Colonoscopy:  Final Diagnosis   A  Descending colon polyp (biopsy):  - Hyperplastic polyp  - Negative for dysplasia      B   Rectosigmoid colon polyp (biposy):  - Hyperplastic polyp  - Negative for dysplasia      Prostate  Followed by Joey Ceballos MD   PSA 19- WNL  Given the patient's genetic profile my recommendation is for continued prostate cancer screening with a rectal exam and PSA on an annual basis     Medical and Surgical History  Pertinent surgical history:   Past Surgical History:   Procedure Laterality Date    COLONOSCOPY      SKIN TAG REMOVAL      VASECTOMY      Vas Deferens      Pertinent medical history:  Past Medical History:   Diagnosis Date    Anemia 1975    Cervicalgia 10/29/2014    Colon polyp     Hypertension 2006    Prostatic hypertrophy     Benign       Other History:  Height:   Ht Readings from Last 1 Encounters:   01/15/20 5' 7" (1 702 m)     Weight:   Wt Readings from Last 1 Encounters:   01/15/20 84 6 kg (186 lb 6 4 oz)      Relevant Family History:  Please refer to the scanned pedigree in the Media Tab for a full pedigree    Son: Age 28 no history of cancer; no genetic testing    Daughter: Age 28 no history of cancer; no genetic testing; she plans to start a family in the near future     Brother: Age 46 diagnosed with colon cancer at the age of 48; he underwent genetic testing with the [facility] panel on 10/8/18 and was found to carry the OSMANY c 2502dupA (p [id number]) pathogenic variant; there were no variants of uncertain significance identified on his test   The [facility] test result did not list the MSH6 variant found in [place], however when I called [facility] they informed me that this MSH6 variant is classified as likely benign and would not appear on the report; therefore we cannot determine if he carries the MSH6 variant     Father:  age 79 with stomach cancer at age 72    2 paternal aunt with breast cancer in their 52's and 66's    2 maternal aunts with breast cancer in there 52's and 66's    *All history is reported as provided by the patient; records are not available for review, except where indicated  Assessment:    Variant 1  OSMANY c 2502dup (p [id number]); heterozygous; pathogenic variant    Inheritance:   Hereditary predisposition to cancer due to pathogenic variants in the OSMANY gene has autosomal dominant inheritance  This means that an individual with 1 pathogenic variant in the OSMANY gene has an increased risk for breast, pancreatic and prostate cancers          Cancer Risks:   Women who are carriers of a single pathogenic OSMANY variant have an increased risk of breast cancer over their lifetime  The lifetime risk for breast cancer is between 17-60%  Men with a single pathogenic variant in OSMANY have an increased risk of prostate cancer although the exact risk is not currently known  Both men and women have an increased risk of pancreatic cancer, although specific risks estimates are not yet established  There is also evidence to suggest an association between OSMANY and colorectal cancer  The OSMANY gene is a "preliminary-evidence" gene regarding the association with colon cancer  Preliminary-evidence genes are selected from an extensive review of the literature and expert recommendations, but the association between the gene and the specific condition has not yet been completely established  This uncertainty may be resolved as new information becomes available  There may also be risks for other cancer types among individuals with a single pathogenic OSMANY variant, however the current evidence is limited and emerging  Risk for Family Members: This test result may help clarify the risk for other family members to develop cancer  All first-degree relatives (parents, siblings and children) have up to a 50% chance of having the pathogenic variant  Other relatives such as aunts, uncles and cousins may also be at risk  Since it is not known with one-hundred percent certainty which side of the family this OSMANY pathogenic variant came from, we recommend that Jennifer Jenkins share this test results with extended family members from both sides of his family  Relatives who wish to pursue genetic testing can reach out to the [facility] [id number]) to schedule an appointment or visit www [email or web address] org to identify a local genetic counselor  Reproductive Risks:  Individuals with a single pathogenic variant in OSMANY are also carriers of ataxia-telangiectasia (A-T)   A-T is an autosomal recessive condition    This means that an individual needs 2 OSMANY pathogenic variants to be affected with ataxia-telangiectasia  An individual with ataxia-telangiectasia inherits 1 pathogenic OSMANY variant from each parent  Features include progressive cerebellar ataxia, oculomotor apraxia, choreoathetosis, telangiectasias of the conjunctivae, immunodeficiency, premature aging, endocrine abnormalities, and increased risk for malignancy--particularly leukemia and lymphoma  For there to be a risk of A-T in offspring, both the patient and his/her partner would each have to carry a pathogenic variant in OSMANY  In this case, the risk of having have an affected child would be 25%  Joby and BASILIO discussed the reproductive risk for ataxia-telangiectasia for his children if they were found to also carry this OSMANY variant  Management:     Women: The [employer] (NCCN) has published screening and surveillance guidelines for women with a single pathogenic variant in OSMANY (NCCN Guidelines-Genetic/Familial High-Risk Assessment: Breast, Ovarian and Pancreatic Version 1 2020):  Screening:  · Annual mammography with consideration of tomosynthesis starting at 36years of age  · Consider annual breast MRI with contrast starting at age 36, with modification as appropriate based on family history or specific gene mutation  · Prophylactic risk-reducing mastectomy: evidence insufficient; manage based on family history  Ionizing Radiation:   There has been some debate as to whether ionizing radiation is considered significantly carcinogenic to individuals with a single pathogenic OSMANY variant  There is currently no substantial evidence to suggest there are additional side effects from ionizing radiation compared to those who do not have an OSMANY pathogenic variant (ANTHONY: [id number])    The NCCN Guidelines-Genetic/Familial High-Risk Assessment: Breast, Ovarian and Pancreatic Version 1 2020 state that there is insufficient evidence to recommend against radiation therapy  Men:  Currently there are no prostate cancer screening recommendations for men who carry a single OSMANY pathogenic variant  Men & Women   The NCCN Guidelines-Genetic/Familial High-Risk Assessment: Breast, Ovarian and Pancreatic Version 1 2020 recommend pancreatic cancer screening for individuals with a single pathogenic OSMANY variant be limited to those with a first- or second-degree relative affected with pancreatic cancer  Ideally, screening should be performed in experienced centers utilizing a multidisciplinary approach under research conditions  Recommended screening includes annual endoscopic ultrasound and/or MRI of the pancreas starting at age 48 or 8 years younger than the earliest age of pancreatic cancer diagnosis in the family  Since the association between the OSMANY gene and colon cancer has not yet been completely established the NCCN Guidelines-Genetic/Familial High-Risk Assessment: Colorectal does not have screening recommendations for colon cancer  Management Summary:  An individuals cancer risk and medical management are not determined by genetic test results alone  Overall cancer risk assessment incorporates additional factors, including personal medical history, family history, and any available genetic information that may result in a personalized plan for cancer prevention and surveillance  See our recommendations in the Plan Section Below  Variant 2  MSH6 c 1844G>T (p [id number]); heterozygous; variant uncertain significance     The significance of the MSH6 variant is currently not known and therefore this test result cannot be used to help determine Joby's cancer risks  Variant 2 Familial VUS testing  - Familial VUS testing was not offered at this time  Joby's brother, who has a personal history of colon cancer, underwent genetic testing with the [facility] panel on 10/8/18    The [facility] test result did not list the MSH6 variant found in [address], however when I called [facility] they informed me that this MSH6 variant is classified as likely benign and would not appear on the report; therefore we cannot determine if he carries the MSH6 variant unless he calls [facility] and asks for this additional information  The laboratory will continue to accumulate information on this variant and will reclassify it as either a positive or negative genetic test result when they are confident that they have adequate information  As updated information is obtained, we will notify Samuel Escobar with the updated information  It is important to note that the majority of variants of uncertain significance are reclassified as likely benign or benign as additional information about the variant becomes available  Risk Based on Family History:  The significance of this variant is currently not known and therefore this test result cannot be used to help determine Joby's cancer risks  Testing Family Members:   Genetic testing for this variant is not recommended for relatives who wish to determine their cancer risks for purposes of determining medical management  The presence or absence of this variant in a relative is not clinically meaningful unless the variant is reclassified in the future  Recommendations:  Recommendations for Samuel Escobar are outlined below however the surveillance and medical management should continue as clinically indicated and as determined appropriate by his healthcare providers  Colon Cancer Screening:   -Screening colonoscopy beginning at age 36 with follow-up colonoscopy every 5 years unless more frequent screening is clinically indicated (NCCN Version 2 2019 Colorectal Cancer Screening; ?1 first-degree relative with colorectal cancer at any age)    Based on Joby's personal history of colon polyps, we recommended that he follow the surveillance and medical management determined appropriate by his healthcare providers however he should not go longer than 5 years between his screening based on the family history  Prostate Cancer Screening  - We strongly encouraged Joby to continue PSA and digital rectal exam on an annual basis as recommended by Dr Kianna Richardson given that Logan Benz carries an OSMANY pathogenic variant which is associated with an increased risk for prostate cancer  Pancreatic Cancer Screening  - NCCN does not currently recommend pancreatic screening for OSMANY mutation carriers in the absence of a close family history of pancreatic cancer  Logan Benz was strongly encouraged to contact us regarding any changes in his personal or family history of cancer as these changes could alter our recommendation regarding genetic testing and/or cancer screening  Logan Benz was also encouraged to follow up with us on an annual basis as variant classifications and OSMANY management recommendations are subject to change

## 2020-02-24 NOTE — TELEPHONE ENCOUNTER
I called and spoke with patient confirmed tomorrow's appointment with Dustin Allen and obtained fhx to complete pedigree  It was very difficult to hear patient as reception was very poor

## 2020-02-25 ENCOUNTER — CONSULT (OUTPATIENT)
Dept: GYNECOLOGIC ONCOLOGY | Facility: CLINIC | Age: 65
End: 2020-02-25

## 2020-02-25 DIAGNOSIS — Z15.01 MONOALLELIC MUTATION OF ATM GENE: Primary | ICD-10-CM

## 2020-02-25 DIAGNOSIS — Z15.09 MONOALLELIC MUTATION OF ATM GENE: Primary | ICD-10-CM

## 2020-02-25 DIAGNOSIS — Z15.89 MONOALLELIC MUTATION OF ATM GENE: Primary | ICD-10-CM

## 2020-02-25 DIAGNOSIS — Z80.0 FAMILY HISTORY OF COLON CANCER: ICD-10-CM

## 2020-02-25 PROCEDURE — NC001 PR NO CHARGE: Performed by: GENETIC COUNSELOR, MS

## 2020-02-26 DIAGNOSIS — I10 ESSENTIAL HYPERTENSION: ICD-10-CM

## 2020-02-27 RX ORDER — AMLODIPINE BESYLATE 10 MG/1
10 TABLET ORAL DAILY
Qty: 90 TABLET | Refills: 1 | Status: SHIPPED | OUTPATIENT
Start: 2020-02-27 | End: 2020-07-23 | Stop reason: SDUPTHER

## 2020-07-23 ENCOUNTER — OFFICE VISIT (OUTPATIENT)
Dept: INTERNAL MEDICINE CLINIC | Facility: CLINIC | Age: 65
End: 2020-07-23
Payer: MEDICARE

## 2020-07-23 VITALS
WEIGHT: 182.8 LBS | RESPIRATION RATE: 18 BRPM | HEIGHT: 67 IN | TEMPERATURE: 98.2 F | HEART RATE: 65 BPM | SYSTOLIC BLOOD PRESSURE: 122 MMHG | BODY MASS INDEX: 28.69 KG/M2 | DIASTOLIC BLOOD PRESSURE: 72 MMHG | OXYGEN SATURATION: 98 %

## 2020-07-23 DIAGNOSIS — E55.9 VITAMIN D DEFICIENCY: ICD-10-CM

## 2020-07-23 DIAGNOSIS — Z01.00 ENCOUNTER FOR VISION SCREENING: ICD-10-CM

## 2020-07-23 DIAGNOSIS — Z97.3 WEARS GLASSES: ICD-10-CM

## 2020-07-23 DIAGNOSIS — Z13.6 ENCOUNTER FOR LIPID SCREENING FOR CARDIOVASCULAR DISEASE: ICD-10-CM

## 2020-07-23 DIAGNOSIS — Z00.00 MEDICARE ANNUAL WELLNESS VISIT, SUBSEQUENT: ICD-10-CM

## 2020-07-23 DIAGNOSIS — D56.8: ICD-10-CM

## 2020-07-23 DIAGNOSIS — I10 ESSENTIAL HYPERTENSION: ICD-10-CM

## 2020-07-23 DIAGNOSIS — N52.9 ERECTILE DYSFUNCTION, UNSPECIFIED ERECTILE DYSFUNCTION TYPE: ICD-10-CM

## 2020-07-23 DIAGNOSIS — Z13.220 ENCOUNTER FOR LIPID SCREENING FOR CARDIOVASCULAR DISEASE: ICD-10-CM

## 2020-07-23 DIAGNOSIS — I10 ESSENTIAL HYPERTENSION: Primary | ICD-10-CM

## 2020-07-23 PROCEDURE — 3078F DIAST BP <80 MM HG: CPT | Performed by: INTERNAL MEDICINE

## 2020-07-23 PROCEDURE — 3008F BODY MASS INDEX DOCD: CPT | Performed by: INTERNAL MEDICINE

## 2020-07-23 PROCEDURE — 99214 OFFICE O/P EST MOD 30 MIN: CPT | Performed by: INTERNAL MEDICINE

## 2020-07-23 PROCEDURE — 3074F SYST BP LT 130 MM HG: CPT | Performed by: INTERNAL MEDICINE

## 2020-07-23 PROCEDURE — 1123F ACP DISCUSS/DSCN MKR DOCD: CPT | Performed by: INTERNAL MEDICINE

## 2020-07-23 PROCEDURE — 1160F RVW MEDS BY RX/DR IN RCRD: CPT | Performed by: INTERNAL MEDICINE

## 2020-07-23 PROCEDURE — 1036F TOBACCO NON-USER: CPT | Performed by: INTERNAL MEDICINE

## 2020-07-23 PROCEDURE — 4040F PNEUMOC VAC/ADMIN/RCVD: CPT | Performed by: INTERNAL MEDICINE

## 2020-07-23 PROCEDURE — G0438 PPPS, INITIAL VISIT: HCPCS | Performed by: INTERNAL MEDICINE

## 2020-07-23 PROCEDURE — 1170F FXNL STATUS ASSESSED: CPT | Performed by: INTERNAL MEDICINE

## 2020-07-23 PROCEDURE — 1125F AMNT PAIN NOTED PAIN PRSNT: CPT | Performed by: INTERNAL MEDICINE

## 2020-07-23 RX ORDER — AMLODIPINE BESYLATE 10 MG/1
10 TABLET ORAL DAILY
Qty: 90 TABLET | Refills: 1 | Status: SHIPPED | OUTPATIENT
Start: 2020-07-23 | End: 2021-01-27 | Stop reason: SDUPTHER

## 2020-07-23 RX ORDER — TADALAFIL 5 MG/1
5 TABLET ORAL DAILY
Qty: 30 TABLET | Refills: 0 | Status: SHIPPED | OUTPATIENT
Start: 2020-07-23 | End: 2020-09-18

## 2020-07-23 RX ORDER — LISINOPRIL AND HYDROCHLOROTHIAZIDE 20; 12.5 MG/1; MG/1
TABLET ORAL
Qty: 180 TABLET | Refills: 1 | Status: SHIPPED | OUTPATIENT
Start: 2020-07-23 | End: 2021-01-15

## 2020-07-23 NOTE — PROGRESS NOTES
Assessment/Plan:     Diagnoses and all orders for this visit:    Essential hypertension  -     amLODIPine (NORVASC) 10 mg tablet; Take 1 tablet (10 mg total) by mouth daily  -     CBC and differential  -     Basic metabolic panel; Future  -     Hepatic function panel; Future    Beta+ thalassemia, normal Hb A2, type 1, silent ([facility] [address] )    Encounter for vision screening  -     Ambulatory referral to Ophthalmology; Future    Wears glasses  -     Ambulatory referral to Ophthalmology; Future    Erectile dysfunction, unspecified erectile dysfunction type  -     tadalafil (CIALIS) 5 MG tablet; Take 1 tablet (5 mg total) by mouth daily    Vitamin D deficiency  -     Vitamin D 25 hydroxy    Encounter for lipid screening for cardiovascular disease  -     Lipid Panel with Direct LDL reflex; Future  -     Hepatic function panel; Future    Medicare annual wellness visit, subsequent          BMI Counseling: Body mass index is 28 63 kg/m²  The BMI is above normal  Exercise recommendations include exercising 3-5 times per week  Will discuss AAA screening at next office visit    Subjective:      Patient ID: Kerrie Zambrano is a 77 y o  male  HPI    Here for follow up, his divorce is complete and he is feeling less stress  He is taking medications as prescribed and exercising  He gained some weight before the pandemic but lost 16 lbs since then and is aiming to get healthier  He is dating again and has had issues with ED in past   He has not taken medication for this in past   He has no CAD in past and denies CP/SOB/YANES at rest or with exertion  He would like to try daily cialis at low dose  ROS otherwise negative, no other complaints      Past Medical History:   Diagnosis Date    Anemia 1975    Cervicalgia 10/29/2014    Colon polyp     Hypertension 2006    Prostatic hypertrophy     Benign     Vitals:    07/23/20 1248   BP: 122/72   Pulse: 65   Resp: 18   Temp: 98 2 °F (36 8 °C)   SpO2: 98%   Weight: 82 9 kg (182 lb 12 8 oz) Height: 5' 7" (1 702 m)     Body mass index is 28 63 kg/m²  Current Outpatient Medications:     amLODIPine (NORVASC) 10 mg tablet, Take 1 tablet (10 mg total) by mouth daily, Disp: 90 tablet, Rfl: 1    Ascorbic Acid (VITAMIN C) 1000 MG tablet, Take 2 tablets by mouth daily, Disp: , Rfl:     aspirin 81 MG tablet, Take 1 tablet by mouth daily, Disp: , Rfl:     B Complex Vitamins (VITAMIN B-COMPLEX 100 IJ), Take by mouth, Disp: , Rfl:     BETA CAROTENE PO, Take by mouth, Disp: , Rfl:     CHROMIUM PO, Take by mouth, Disp: , Rfl:     coenzyme Q-10 100 MG capsule, Take by mouth, Disp: , Rfl:     Lactobacillus (ACIDOPHILUS PO), Take by mouth, Disp: , Rfl:     lisinopril-hydrochlorothiazide (PRINZIDE,ZESTORETIC) 20-12 5 MG per tablet, TAKE 2 TABLETS BY MOUTH EVERY DAY, Disp: 180 tablet, Rfl: 1    Multiple Vitamin-Folic Acid TABS, Take by mouth, Disp: , Rfl:     Omega-3 Fatty Acids (FISH OIL) 1,000 mg, Take 2 tablets by mouth daily, Disp: , Rfl:     vitamin E, tocopherol, 400 units capsule, Take 400 Units by mouth 2 (two) times a day, Disp: , Rfl:     tadalafil (CIALIS) 5 MG tablet, Take 1 tablet (5 mg total) by mouth daily, Disp: 30 tablet, Rfl: 0  Allergies   Allergen Reactions    Albuterol Throat Swelling         Review of Systems   Constitutional: Negative for fever  HENT: Negative for congestion  Eyes: Negative for visual disturbance  Respiratory: Negative for shortness of breath  Cardiovascular: Negative for chest pain  Gastrointestinal: Negative for abdominal pain  Endocrine: Negative for polyuria  Genitourinary: Negative for difficulty urinating  Musculoskeletal: Negative for arthralgias  Skin: Negative for rash  Allergic/Immunologic: Negative for immunocompromised state  Neurological: Negative for headaches  Psychiatric/Behavioral: Negative for dysphoric mood           Objective:      /72   Pulse 65   Temp 98 2 °F (36 8 °C)   Resp 18   Ht 5' 7" (1 702 m)   Wt 82 9 kg (182 lb 12 8 oz)   SpO2 98%   BMI 28 63 kg/m²          Physical Exam   Constitutional: He appears well-developed and well-nourished  HENT:   Head: Normocephalic and atraumatic  Eyes: Pupils are equal, round, and reactive to light  Cardiovascular: Normal rate and regular rhythm  No murmur heard  Pulmonary/Chest: Effort normal and breath sounds normal  He has no wheezes  He has no rales  Abdominal: Soft  Bowel sounds are normal  There is no tenderness  Musculoskeletal: He exhibits no edema  Neurological: He is alert  Psychiatric: He has a normal mood and affect  His behavior is normal    Vitals reviewed

## 2020-07-23 NOTE — PROGRESS NOTES
Assessment and Plan:     Problem List Items Addressed This Visit     Beta+ thalassemia, normal Hb A2, type 1, silent ([address] )    Essential hypertension - Primary    Relevant Medications    amLODIPine (NORVASC) 10 mg tablet    Other Relevant Orders    CBC and differential    Basic metabolic panel    Hepatic function panel    Low vitamin D level      Other Visit Diagnoses     Encounter for vision screening        requests ref to new eye dr(previous in [place])  ref provided    Relevant Orders    Ambulatory referral to Ophthalmology    Wears glasses        Relevant Orders    Ambulatory referral to Ophthalmology    Erectile dysfunction, unspecified erectile dysfunction type        d/w patient side effects of cialis and he is willing to try, rx ordered    Relevant Medications    tadalafil (CIALIS) 5 MG tablet    Encounter for lipid screening for cardiovascular disease        Relevant Orders    Lipid Panel with Direct LDL reflex    Hepatic function panel    Medicare annual wellness visit, subsequent            BMI Counseling: Body mass index is 28 63 kg/m²  The BMI is above normal  Exercise recommendations include exercising 3-5 times per week  Preventive health issues were discussed with patient, and age appropriate screening tests were ordered as noted in patient's After Visit Summary  Personalized health advice and appropriate referrals for health education or preventive services given if needed, as noted in patient's After Visit Summary       History of Present Illness:     Patient presents for Medicare Annual Wellness visit    Patient Care Team:  Hussain Pelletier DO as PCP - General (Internal Medicine)  Brie Chawla MD     Problem List:     Patient Active Problem List   Diagnosis    Essential hypertension    Benign colon polyp    Beta+ thalassemia, normal Hb A2, type 1, silent ([address] )    Low vitamin D level    Seasonal allergies    Cervical radiculopathy    Genetic testing    Family history of colon cancer    Monoallelic mutation of OSMANY gene    Screening for prostate cancer      Past Medical and Surgical History:     Past Medical History:   Diagnosis Date    Anemia 1975    Cervicalgia 10/29/2014    Colon polyp     Hypertension 2006    Prostatic hypertrophy     Benign     Past Surgical History:   Procedure Laterality Date    COLONOSCOPY      SKIN TAG REMOVAL      VASECTOMY      Vas Deferens      Family History:     Family History   Problem Relation Age of Onset    Thalassemia Mother     Heart Valve Disease Mother         heart valve replaced    Hypertension Father     Stomach cancer Father     Colon cancer Brother 48        mass in colon, suspected cancer-dx'd at 47 y/o    Alcohol abuse Neg Hx     Substance Abuse Neg Hx     Mental illness Neg Hx     Depression Neg Hx       Social History:        Social History     Socioeconomic History    Marital status:      Spouse name: None    Number of children: None    Years of education: None    Highest education level: None   Occupational History    Occupation: Retired, IT/   Social Needs    Financial resource strain: None    Food insecurity:     Worry: None     Inability: None    Transportation needs:     Medical: None     Non-medical: None   Tobacco Use    Smoking status: Never Smoker    Smokeless tobacco: Never Used   Substance and Sexual Activity    Alcohol use: Yes     Frequency: 2-3 times a week     Comment: drinks wine    Drug use: No    Sexual activity: None   Lifestyle    Physical activity:     Days per week: None     Minutes per session: None    Stress: None   Relationships    Social connections:     Talks on phone: None     Gets together: None     Attends Buddhist service: None     Active member of club or organization: None     Attends meetings of clubs or organizations: None     Relationship status: None    Intimate partner violence:     Fear of current or ex partner: None     Emotionally abused: None     Physically abused: None     Forced sexual activity: None   Other Topics Concern    None   Social History Narrative    Exercise habits    Living situations, wife and dog    Uses safety equipment- Seatbelts      Medications and Allergies:     Current Outpatient Medications   Medication Sig Dispense Refill    amLODIPine (NORVASC) 10 mg tablet Take 1 tablet (10 mg total) by mouth daily 90 tablet 1    Ascorbic Acid (VITAMIN C) 1000 MG tablet Take 2 tablets by mouth daily      aspirin 81 MG tablet Take 1 tablet by mouth daily      B Complex Vitamins (VITAMIN B-COMPLEX 100 IJ) Take by mouth      BETA CAROTENE PO Take by mouth      CHROMIUM PO Take by mouth      coenzyme Q-10 100 MG capsule Take by mouth      Lactobacillus (ACIDOPHILUS PO) Take by mouth      lisinopril-hydrochlorothiazide (PRINZIDE,ZESTORETIC) 20-12 5 MG per tablet TAKE 2 TABLETS BY MOUTH EVERY  tablet 1    Multiple Vitamin-Folic Acid TABS Take by mouth      Omega-3 Fatty Acids (FISH OIL) 1,000 mg Take 2 tablets by mouth daily      vitamin E, tocopherol, 400 units capsule Take 400 Units by mouth 2 (two) times a day      tadalafil (CIALIS) 5 MG tablet Take 1 tablet (5 mg total) by mouth daily 30 tablet 0     No current facility-administered medications for this visit        Allergies   Allergen Reactions    Albuterol Throat Swelling      Immunizations:     Immunization History   Administered Date(s) Administered    INFLUENZA 11/11/2015, 11/11/2015, 10/09/2018, 07/15/2019    Influenza Split High Dose Preservative Free IM 10/09/2018    Influenza TIV (IM) 11/03/2015, 10/24/2017    Pneumococcal Conjugate 13-Valent 01/23/2018    Pneumococcal Polysaccharide PPV23 01/30/2019    Tdap 09/22/2014    Zoster 02/22/2018    influenza, trivalent, adjuvanted 10/18/2019      Health Maintenance:         Topic Date Due    Hepatitis C Screening  Completed         Topic Date Due    Influenza Vaccine  07/01/2020      Medicare Health Risk Assessment:     BP 122/72   Pulse 65   Temp 98 2 °F (36 8 °C)   Resp 18   Ht 5' 7" (1 702 m)   Wt 82 9 kg (182 lb 12 8 oz)   SpO2 98%   BMI 28 63 kg/m²      Evgeny Castillo is here for his Subsequent Wellness visit  Last Medicare Wellness visit information reviewed, patient interviewed and updates made to the record today  Health Risk Assessment:   Patient rates overall health as excellent  Patient feels that their physical health rating is same  Eyesight was rated as same  Hearing was rated as same  Patient feels that their emotional and mental health rating is same  Pain experienced in the last 7 days has been none  Patient states that he has experienced no weight loss or gain in last 6 months  Depression Screening:   PHQ-2 Score: 0      Fall Risk Screening: In the past year, patient has experienced: no history of falling in past year      Home Safety:  Patient does not have trouble with stairs inside or outside of their home  Patient has working smoke alarms and has no working carbon monoxide detector  Home safety hazards include: none  Nutrition:   Current diet is Regular and Limited junk food  Medications:   Patient is not currently taking any over-the-counter supplements  Patient is able to manage medications  Activities of Daily Living (ADLs)/Instrumental Activities of Daily Living (IADLs):   Walk and transfer into and out of bed and chair?: Yes  Dress and groom yourself?: Yes    Bathe or shower yourself?: Yes    Feed yourself?  Yes  Do your laundry/housekeeping?: Yes  Manage your money, pay your bills and track your expenses?: Yes  Make your own meals?: Yes    Do your own shopping?: Yes    Previous Hospitalizations:   Any hospitalizations or ED visits within the last 12 months?: No      Advance Care Planning:   Living will: No    Durable POA for healthcare: No    Advanced directive: No      PREVENTIVE SCREENINGS      Cardiovascular Screening:    General: Screening Current      Diabetes Screening: General: Screening Current      Colorectal Cancer Screening:     General: Screening Current      Prostate Cancer Screening:    General: Screening Current      Osteoporosis Screening:    General: Screening Not Indicated      Abdominal Aortic Aneurysm (AAA) Screening:    Risk factors include: age between 73-67 yo        General: Screening Not Indicated      Lung Cancer Screening:     General: Screening Not Indicated      Hepatitis C Screening:    General: Screening Current    Other Counseling Topics:   Will discuss AAA screening at next visit      Saul Barrientos DO

## 2020-07-23 NOTE — PATIENT INSTRUCTIONS
1  Medications refilled  2  cialis - start 1/2 tablet daily  3  Return in 6 months  Medicare Preventive Visit Patient Instructions  Thank you for completing your Welcome to Medicare Visit or Medicare Annual Wellness Visit today  Your next wellness visit will be due in one year (7/23/2021)  The screening/preventive services that you may require over the next 5-10 years are detailed below  Some tests may not apply to you based off risk factors and/or age  Screening tests ordered at today's visit but not completed yet may show as past due  Also, please note that scanned in results may not display below  Preventive Screenings:  Service Recommendations Previous Testing/Comments   Colorectal Cancer Screening  · Colonoscopy    · Fecal Occult Blood Test (FOBT)/Fecal Immunochemical Test (FIT)  · Fecal DNA/Cologuard Test  · Flexible Sigmoidoscopy Age: 54-65 years old   Colonoscopy: every 10 years (May be performed more frequently if at higher risk)  OR  FOBT/FIT: every 1 year  OR  Cologuard: every 3 years  OR  Sigmoidoscopy: every 5 years  Screening may be recommended earlier than age 48 if at higher risk for colorectal cancer  Also, an individualized decision between you and your healthcare provider will decide whether screening between the ages of 74-80 would be appropriate   Colonoscopy: 08/23/2019  FOBT/FIT: Not on file  Cologuard: Not on file  Sigmoidoscopy: Not on file         Prostate Cancer Screening Individualized decision between patient and health care provider in men between ages of 53-78   Medicare will cover every 12 months beginning on the day after your 50th birthday PSA: 2 1 ng/mL          Hepatitis C Screening Once for adults born between 1945 and 1965  More frequently in patients at high risk for Hepatitis C Hep C Antibody: 07/09/2019       Diabetes Screening 1-2 times per year if you're at risk for diabetes or have pre-diabetes Fasting glucose: 85 mg/dL   A1C: No results in last 5 years       Cholesterol Screening Once every 5 years if you don't have a lipid disorder  May order more often based on risk factors  Lipid panel: 01/07/2020          Other Preventive Screenings Covered by Medicare:  1  Abdominal Aortic Aneurysm (AAA) Screening: covered once if your at risk  You're considered to be at risk if you have a family history of AAA or a male between the age of 73-68 who smoking at least 100 cigarettes in your lifetime  2  Lung Cancer Screening: covers low dose CT scan once per year if you meet all of the following conditions: (1) Age 50-69; (2) No signs or symptoms of lung cancer; (3) Current smoker or have quit smoking within the last 15 years; (4) You have a tobacco smoking history of at least 30 pack years (packs per day x number of years you smoked); (5) You get a written order from a healthcare provider  3  Glaucoma Screening: covered annually if you're considered high risk: (1) You have diabetes OR (2) Family history of glaucoma OR (3)  aged 48 and older OR (3)  American aged 72 and older  3  Osteoporosis Screening: covered every 2 years if you meet one of the following conditions: (1) Have a vertebral abnormality; (2) On glucocorticoid therapy for more than 3 months; (3) Have primary hyperparathyroidism; (4) On osteoporosis medications and need to assess response to drug therapy  5  HIV Screening: covered annually if you're between the age of 12-76  Also covered annually if you are younger than 13 and older than 72 with risk factors for HIV infection  For pregnant patients, it is covered up to 3 times per pregnancy      Immunizations:  Immunization Recommendations   Influenza Vaccine Annual influenza vaccination during flu season is recommended for all persons aged >= 6 months who do not have contraindications   Pneumococcal Vaccine (Prevnar and Pneumovax)  * Prevnar = PCV13  * Pneumovax = PPSV23 Adults 25-60 years old: 1-3 doses may be recommended based on certain risk factors  Adults 72 years old: Prevnar (PCV13) vaccine recommended followed by Pneumovax (PPSV23) vaccine  If already received PPSV23 since turning 65, then PCV13 recommended at least one year after PPSV23 dose  Hepatitis B Vaccine 3 dose series if at intermediate or high risk (ex: diabetes, end stage renal disease, liver disease)   Tetanus (Td) Vaccine - COST NOT COVERED BY MEDICARE PART B Following completion of primary series, a booster dose should be given every 10 years to maintain immunity against tetanus  Td may also be given as tetanus wound prophylaxis  Tdap Vaccine - COST NOT COVERED BY MEDICARE PART B Recommended at least once for all adults  For pregnant patients, recommended with each pregnancy  Shingles Vaccine (Shingrix) - COST NOT COVERED BY MEDICARE PART B  2 shot series recommended in those aged 48 and above     Health Maintenance Due:      Topic Date Due    Hepatitis C Screening  Completed     Immunizations Due:      Topic Date Due    Influenza Vaccine  07/01/2020     Advance Directives   What are advance directives? Advance directives are legal documents that state your wishes and plans for medical care  These plans are made ahead of time in case you lose your ability to make decisions for yourself  Advance directives can apply to any medical decision, such as the treatments you want, and if you want to donate organs  What are the types of advance directives? There are many types of advance directives, and each state has rules about how to use them  You may choose a combination of any of the following:  · Living will: This is a written record of the treatment you want  You can also choose which treatments you do not want, which to limit, and which to stop at a certain time  This includes surgery, medicine, IV fluid, and tube feedings  · Durable power of  for healthcare [facility]):   This is a written record that states who you want to make healthcare choices for you when you are unable to make them for yourself  This person, called a proxy, is usually a family member or a friend  You may choose more than 1 proxy  · Do not resuscitate (DNR) order:  A DNR order is used in case your heart stops beating or you stop breathing  It is a request not to have certain forms of treatment, such as CPR  A DNR order may be included in other types of advance directives  · Medical directive: This covers the care that you want if you are in a coma, near death, or unable to make decisions for yourself  You can list the treatments you want for each condition  Treatment may include pain medicine, surgery, blood transfusions, dialysis, IV or tube feedings, and a ventilator (breathing machine)  · Values history: This document has questions about your views, beliefs, and how you feel and think about life  This information can help others choose the care that you would choose  Why are advance directives important? An advance directive helps you control your care  Although spoken wishes may be used, it is better to have your wishes written down  Spoken wishes can be misunderstood, or not followed  Treatments may be given even if you do not want them  An advance directive may make it easier for your family to make difficult choices about your care  Weight Management   Why it is important to manage your weight:  Being overweight increases your risk of health conditions such as heart disease, high blood pressure, type 2 diabetes, and certain types of cancer  It can also increase your risk for osteoarthritis, sleep apnea, and other respiratory problems  Aim for a slow, steady weight loss  Even a small amount of weight loss can lower your risk of health problems  How to lose weight safely:  A safe and healthy way to lose weight is to eat fewer calories and get regular exercise  You can lose up about 1 pound a week by decreasing the number of calories you eat by 500 calories each day     Healthy meal plan for weight management:  A healthy meal plan includes a variety of foods, contains fewer calories, and helps you stay healthy  A healthy meal plan includes the following:  · Eat whole-grain foods more often  A healthy meal plan should contain fiber  Fiber is the part of grains, fruits, and vegetables that is not broken down by your body  Whole-grain foods are healthy and provide extra fiber in your diet  Some examples of whole-grain foods are whole-wheat breads and pastas, oatmeal, brown rice, and bulgur  · Eat a variety of vegetables every day  Include dark, leafy greens such as spinach, kale, chris greens, and mustard greens  Eat yellow and orange vegetables such as carrots, sweet potatoes, and winter squash  · Eat a variety of fruits every day  Choose fresh or canned fruit (canned in its own juice or light syrup) instead of juice  Fruit juice has very little or no fiber  · Eat low-fat dairy foods  Drink fat-free (skim) milk or 1% milk  Eat fat-free yogurt and low-fat cottage cheese  Try low-fat cheeses such as mozzarella and other reduced-fat cheeses  · Choose meat and other protein foods that are low in fat  Choose beans or other legumes such as split peas or lentils  Choose fish, skinless poultry (chicken or turkey), or lean cuts of red meat (beef or pork)  Before you cook meat or poultry, cut off any visible fat  · Use less fat and oil  Try baking foods instead of frying them  Add less fat, such as margarine, sour cream, regular salad dressing and mayonnaise to foods  Eat fewer high-fat foods  Some examples of high-fat foods include french fries, doughnuts, ice cream, and cakes  · Eat fewer sweets  Limit foods and drinks that are high in sugar  This includes candy, cookies, regular soda, and sweetened drinks  Exercise:  Exercise at least 30 minutes per day on most days of the week  Some examples of exercise include walking, biking, dancing, and swimming   You can also fit in more physical activity by taking the stairs instead of the elevator or parking farther away from stores  Ask your healthcare provider about the best exercise plan for you  © Copyright [employer] 2018 Information is for End User's use only and may not be sold, redistributed or otherwise used for commercial purposes   All illustrations and images included in CareNotes® are the copyrighted property of A D [employer] M , [employer]  or [address] [place] [address] [employer]

## 2020-08-13 NOTE — H&P
Chief Complaint   Patient presents with    Medicare AWV       Have you seen any other physician or provider since your last visit no    Have you had any other diagnostic tests since your last visit? no    Have you changed or stopped any medications since your last visit? no       Review of Systems   Constitutional: Negative for chills, fatigue and fever. HENT: Negative for congestion, ear pain, rhinorrhea and sore throat. Eyes: Negative for discharge, redness and itching. Respiratory: Negative for cough and shortness of breath. Cardiovascular: Negative for chest pain, palpitations and leg swelling. Gastrointestinal: Negative for abdominal pain, constipation, diarrhea, nausea and vomiting. Endocrine: Negative for cold intolerance and heat intolerance. Genitourinary: Negative for dysuria. Musculoskeletal: Negative for arthralgias and joint swelling. Skin: Negative for rash and wound. Neurological: Negative for weakness and headaches. Hematological: Negative for adenopathy. Psychiatric/Behavioral: Negative for dysphoric mood and sleep disturbance. The patient is not nervous/anxious. Health Maintenance Due This Visit   Colonoscopy Yes- will do cologuard   Mammogram No   Annual Wellness Visit Yes- done today   Microalbumin No   HgbA1C No   Diabetic Eye Exam No    House Bill One Due This Visit   KANG No   UDS No   Contract No    Unable to reach pt to previsit. History and Physical   Colon and Rectal Surgery   Viki Luke 75 y o  male MRN: [id number]  Unit/Bed#:  Encounter: [id number]  08/23/19   11:03 AM      No chief complaint on file          History of Present Illness   HPI:  Viki Luke is a 75 y o  male who presents with h/o polyp    Historical Information   Past Medical History:   Diagnosis Date    Anemia 1975    Cervicalgia 10/29/2014    Colon polyp     Hypertension 2006    Prostatic hypertrophy     Benign     Past Surgical History:   Procedure Laterality Date    COLONOSCOPY      SKIN TAG REMOVAL      VASECTOMY      Vas Deferens       Meds/Allergies       (Not in a hospital admission)      Current Outpatient Medications:     amLODIPine (NORVASC) 10 mg tablet, Take 1 tablet (10 mg total) by mouth daily, Disp: 90 tablet, Rfl: 1    lisinopril-hydrochlorothiazide (PRINZIDE,ZESTORETIC) 20-12 5 MG per tablet, Take 2 tablets by mouth daily, Disp: 180 tablet, Rfl: 1    Ascorbic Acid (VITAMIN C) 1000 MG tablet, Take 2 tablets by mouth daily, Disp: , Rfl:     aspirin 81 MG tablet, Take 1 tablet by mouth daily, Disp: , Rfl:     B Complex Vitamins (VITAMIN B-COMPLEX 100 IJ), Take by mouth, Disp: , Rfl:     BETA CAROTENE PO, Take by mouth, Disp: , Rfl:     CHROMIUM PO, Take by mouth, Disp: , Rfl:     coenzyme Q-10 100 MG capsule, Take by mouth, Disp: , Rfl:     Lactobacillus (ACIDOPHILUS PO), Take by mouth, Disp: , Rfl:     Multiple Vitamin-Folic Acid TABS, Take by mouth, Disp: , Rfl:     Omega-3 Fatty Acids (FISH OIL) 1,000 mg, Take 2 tablets by mouth daily, Disp: , Rfl:     vitamin E, tocopherol, 400 units capsule, Take 400 Units by mouth 2 (two) times a day, Disp: , Rfl:     Current Facility-Administered Medications:     lactated ringers infusion, 125 mL/hr, Intravenous, Continuous, Bruno Keller MD    lidocaine (PF) (XYLOCAINE-MPF) 1 % injection 0 5 mL, 0 5 mL, Infiltration, Once PRN, Bruno Keller MD    Allergies   Allergen Reactions   Chandrika Roles Albuterol Throat Swelling         Social History   Social History     Substance and Sexual Activity   Alcohol Use Yes    Frequency: 2-3 times a week    Comment: drinks wine     Social History     Substance and Sexual Activity   Drug Use No     Social History     Tobacco Use   Smoking Status Never Smoker   Smokeless Tobacco Never Used         Family History:   Family History   Problem Relation Age of Onset    Thalassemia Mother     Heart Valve Disease Mother         heart valve replaced    Hypertension Father     Stomach cancer Father     Colon cancer Brother 48        mass in colon, suspected cancer-dx'd at 49 y/o    Alcohol abuse Neg Hx     Substance Abuse Neg Hx     Mental illness Neg Hx     Depression Neg Hx          Objective     Current Vitals:   Blood Pressure: 140/72 (08/23/19 1002)  Pulse: (!) 54 (08/23/19 1002)  Temperature: (!) 97 °F (36 1 °C) (08/23/19 1002)  Temp Source: Temporal (08/23/19 1002)  Respirations: 18 (08/23/19 1002)  Height: 5' 7" (170 2 cm) (08/23/19 1002)  Weight - Scale: 81 2 kg (179 lb) (08/23/19 1002)  SpO2: 98 % (08/23/19 1002)  No intake or output data in the 24 hours ending 08/23/19 1103    Physical Exam:  General: No acute distress  Eyes: Normal   ENT: Normal   Neck: No JVD  Pulm: Normal in A&P  CV: NSR no murmur  Abdomen: Soft and normal on palpation, no mass, no tenderness, no guarding  Rectal: Normal sphincter tone, no perianal skin lesions  Extremities: Normal  Lymphatics: Normal        Lab Results: I have personally reviewed pertinent lab results  Imaging: I have personally reviewed pertinent reports  Patient was consented by myself for procedure as explained earlier with all the risks and benefits described  All questions answered  ASSESSMENT:  Rei Torres is a 75 y o  male who presents with h/o polyp        PLAN:  colonoscopy

## 2020-09-03 ENCOUNTER — APPOINTMENT (OUTPATIENT)
Dept: LAB | Facility: CLINIC | Age: 65
End: 2020-09-03
Payer: MEDICARE

## 2020-09-03 DIAGNOSIS — Z12.5 SCREENING FOR PROSTATE CANCER: ICD-10-CM

## 2020-09-03 DIAGNOSIS — Z15.89 MONOALLELIC MUTATION OF ATM GENE: ICD-10-CM

## 2020-09-03 DIAGNOSIS — Z15.09 MONOALLELIC MUTATION OF ATM GENE: ICD-10-CM

## 2020-09-03 DIAGNOSIS — R17 ELEVATED BILIRUBIN: ICD-10-CM

## 2020-09-03 DIAGNOSIS — Z15.01 MONOALLELIC MUTATION OF ATM GENE: ICD-10-CM

## 2020-09-03 LAB
25(OH)D3 SERPL-MCNC: 50.4 NG/ML (ref 30–100)
BASOPHILS # BLD AUTO: 0.03 THOUSANDS/ΜL (ref 0–0.1)
BASOPHILS NFR BLD AUTO: 1 % (ref 0–1)
BILIRUB DIRECT SERPL-MCNC: 0.22 MG/DL (ref 0–0.2)
BILIRUB SERPL-MCNC: 1.11 MG/DL (ref 0.2–1)
EOSINOPHIL # BLD AUTO: 0.28 THOUSAND/ΜL (ref 0–0.61)
EOSINOPHIL NFR BLD AUTO: 7 % (ref 0–6)
ERYTHROCYTE [DISTWIDTH] IN BLOOD BY AUTOMATED COUNT: 17.2 % (ref 11.6–15.1)
HCT VFR BLD AUTO: 36.5 % (ref 36.5–49.3)
HGB BLD-MCNC: 11.2 G/DL (ref 12–17)
IMM GRANULOCYTES # BLD AUTO: 0.02 THOUSAND/UL (ref 0–0.2)
IMM GRANULOCYTES NFR BLD AUTO: 1 % (ref 0–2)
LYMPHOCYTES # BLD AUTO: 0.7 THOUSANDS/ΜL (ref 0.6–4.47)
LYMPHOCYTES NFR BLD AUTO: 18 % (ref 14–44)
MCH RBC QN AUTO: 19.6 PG (ref 26.8–34.3)
MCHC RBC AUTO-ENTMCNC: 30.7 G/DL (ref 31.4–37.4)
MCV RBC AUTO: 64 FL (ref 82–98)
MONOCYTES # BLD AUTO: 0.41 THOUSAND/ΜL (ref 0.17–1.22)
MONOCYTES NFR BLD AUTO: 10 % (ref 4–12)
NEUTROPHILS # BLD AUTO: 2.49 THOUSANDS/ΜL (ref 1.85–7.62)
NEUTS SEG NFR BLD AUTO: 63 % (ref 43–75)
NRBC BLD AUTO-RTO: 0 /100 WBCS
PLATELET # BLD AUTO: 269 THOUSANDS/UL (ref 149–390)
PMV BLD AUTO: 11 FL (ref 8.9–12.7)
PSA SERPL-MCNC: 2.5 NG/ML (ref 0–4)
RBC # BLD AUTO: 5.71 MILLION/UL (ref 3.88–5.62)
WBC # BLD AUTO: 3.93 THOUSAND/UL (ref 4.31–10.16)

## 2020-09-03 PROCEDURE — G0103 PSA SCREENING: HCPCS | Performed by: INTERNAL MEDICINE

## 2020-09-03 PROCEDURE — 82247 BILIRUBIN TOTAL: CPT

## 2020-09-03 PROCEDURE — 36415 COLL VENOUS BLD VENIPUNCTURE: CPT | Performed by: INTERNAL MEDICINE

## 2020-09-03 PROCEDURE — 85025 COMPLETE CBC W/AUTO DIFF WBC: CPT | Performed by: INTERNAL MEDICINE

## 2020-09-03 PROCEDURE — 82248 BILIRUBIN DIRECT: CPT

## 2020-09-03 PROCEDURE — 82306 VITAMIN D 25 HYDROXY: CPT | Performed by: INTERNAL MEDICINE

## 2020-09-09 ENCOUNTER — TELEPHONE (OUTPATIENT)
Dept: INTERNAL MEDICINE CLINIC | Facility: CLINIC | Age: 65
End: 2020-09-09

## 2020-09-09 NOTE — TELEPHONE ENCOUNTER
----- Message from Adelita Starkey DO sent at 9/9/2020 11:30 AM EDT -----  BW is back  blood count remains stable and vitamin D looks good  Prostate blood work is stable at 2 5, results released on [facility]    Let me know if questions

## 2020-09-11 ENCOUNTER — OFFICE VISIT (OUTPATIENT)
Dept: UROLOGY | Facility: CLINIC | Age: 65
End: 2020-09-11
Payer: MEDICARE

## 2020-09-11 VITALS
WEIGHT: 182 LBS | HEIGHT: 67 IN | BODY MASS INDEX: 28.56 KG/M2 | TEMPERATURE: 97.7 F | SYSTOLIC BLOOD PRESSURE: 142 MMHG | DIASTOLIC BLOOD PRESSURE: 76 MMHG

## 2020-09-11 DIAGNOSIS — Z12.5 SCREENING FOR PROSTATE CANCER: Primary | ICD-10-CM

## 2020-09-11 PROCEDURE — 99213 OFFICE O/P EST LOW 20 MIN: CPT | Performed by: PHYSICIAN ASSISTANT

## 2020-09-11 NOTE — PROGRESS NOTES
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    PSA remains stable with normal digital rectal examination in the office today  We will continue with annual surveillance  Follow-up 1 year PSA prior to visit  Encouraged to contact us the meantime with any concerns  Zoe Adkins PA-C      Chief Complaint     Prostate cancer screening,      History of Present Illness     Cecilia Saldivar is a 77 y o  male patient of Dr Shannon Ram presents for prostate cancer screening  Patient was found on genetic testing be to carrier of a OSMANY germline mutation  Patient's brother, he is 3 of 11, was diagnosed with early onset advanced colon cancer at age 48  This was detected on screening colonoscopy  He has been treated surgically at this point  His brother underwent genetic testing was found to be a carrier therefore underwent genetic testing as well  He to was found to be a carrier of this germ line mutation  The OSMANY gene has been linked to hereditary prostate cancer  Patient has no familial history of prostate cancer itself  He has no lower urinary tract symptoms  Patient's most recent PSA is 2 5 (09/03/2020), previously 2 1 (09/25/2019)  Patient is overall comfortable with his lower urinary tract symptoms  Does notice some mild and morning hesitancy which tapers off throughout the morning  Denies any dysuria, gross hematuria, or urinary infections  Review of Systems     Review of Systems   Constitutional: Negative for activity change and fatigue  HENT: Negative for congestion  Eyes: Negative for visual disturbance  Respiratory: Negative for shortness of breath and wheezing  Cardiovascular: Negative for chest pain and leg swelling     Gastrointestinal: Negative for abdominal pain  Genitourinary: Negative for difficulty urinating, dysuria, flank pain, hematuria and urgency  Morning urinary hesitancy, mild   Musculoskeletal: Negative for back pain  Allergic/Immunologic: Negative for immunocompromised state  Neurological: Negative for dizziness and numbness  Psychiatric/Behavioral: Negative for dysphoric mood  All other systems reviewed and are negative  Allergies     Allergies   Allergen Reactions    Albuterol Throat Swelling       Physical Exam     Physical Exam  Constitutional:       General: He is not in acute distress  Appearance: He is well-developed  HENT:      Head: Normocephalic and atraumatic  Neck:      Musculoskeletal: Normal range of motion  Cardiovascular:      Comments: Negative lower extremity edema  Pulmonary:      Effort: Pulmonary effort is normal       Breath sounds: Normal breath sounds  Abdominal:      Palpations: Abdomen is soft  Genitourinary:     Comments: Rectal exam reveals a 40 g prostate smooth no nodules or induration  Musculoskeletal: Normal range of motion  Skin:     General: Skin is warm  Neurological:      Mental Status: He is alert and oriented to person, place, and time     Psychiatric:         Behavior: Behavior normal              Vital Signs  Vitals:    09/11/20 0912   BP: 142/76   BP Location: Left arm   Patient Position: Sitting   Cuff Size: Standard   Temp: 97 7 °F (36 5 °C)   TempSrc: Temporal   Weight: 82 6 kg (182 lb)   Height: 5' 7" (1 702 m)         Current Medications       Current Outpatient Medications:     amLODIPine (NORVASC) 10 mg tablet, Take 1 tablet (10 mg total) by mouth daily, Disp: 90 tablet, Rfl: 1    Ascorbic Acid (VITAMIN C) 1000 MG tablet, Take 2 tablets by mouth daily, Disp: , Rfl:     aspirin 81 MG tablet, Take 1 tablet by mouth daily, Disp: , Rfl:     B Complex Vitamins (VITAMIN B-COMPLEX 100 IJ), Take by mouth, Disp: , Rfl:     BETA CAROTENE PO, Take by mouth, Disp: , Rfl:     CHROMIUM PO, Take by mouth, Disp: , Rfl:     coenzyme Q-10 100 MG capsule, Take by mouth, Disp: , Rfl:     Lactobacillus (ACIDOPHILUS PO), Take by mouth, Disp: , Rfl:     lisinopril-hydrochlorothiazide (PRINZIDE,ZESTORETIC) 20-12 5 MG per tablet, TAKE 2 TABLETS BY MOUTH EVERY DAY, Disp: 180 tablet, Rfl: 1    Multiple Vitamin-Folic Acid TABS, Take by mouth, Disp: , Rfl:     Omega-3 Fatty Acids (FISH OIL) 1,000 mg, Take 2 tablets by mouth daily, Disp: , Rfl:     tadalafil (CIALIS) 5 MG tablet, Take 1 tablet (5 mg total) by mouth daily, Disp: 30 tablet, Rfl: 0    vitamin E, tocopherol, 400 units capsule, Take 400 Units by mouth 2 (two) times a day, Disp: , Rfl:       Active Problems     Patient Active Problem List   Diagnosis    Essential hypertension    Benign colon polyp    Beta+ thalassemia, normal Hb A2, type 1, silent (HCC)    Low vitamin D level    Seasonal allergies    Cervical radiculopathy    Genetic testing    Family history of colon cancer    Monoallelic mutation of OSMANY gene    Screening for prostate cancer         Past Medical History     Past Medical History:   Diagnosis Date    Anemia 1975    Cervicalgia 10/29/2014    Colon polyp     Hypertension 2006    Prostatic hypertrophy     Benign         Surgical History     Past Surgical History:   Procedure Laterality Date    COLONOSCOPY      SKIN TAG REMOVAL      VASECTOMY      Vas Deferens         Family History     Family History   Problem Relation Age of Onset    Thalassemia Mother     Heart Valve Disease Mother         heart valve replaced    Hypertension Father     Stomach cancer Father     Colon cancer Brother 48        mass in colon, suspected cancer-dx'd at 47 y/o    Alcohol abuse Neg Hx     Substance Abuse Neg Hx     Mental illness Neg Hx     Depression Neg Hx          Social History     Social History     Social History     Tobacco Use   Smoking Status Never Smoker   Smokeless Tobacco Never Used Pertinent Lab Values     Lab Results   Component Value Date    CREATININE 1 19 01/07/2020       Lab Results   Component Value Date    PSA 2 5 09/03/2020    PSA 2 1 09/25/2019    PSA 1 6 04/12/2016       @RESULTRCNT(1H])@      Pertinent Imaging      - n/a    Portions of the record may have been created with voice recognition software   Occasional wrong word or "sound a like" substitutions may have occurred due to the inherent limitations of voice recognition software   Read the chart carefully and recognize, using context, where substitutions have occurred

## 2020-09-18 DIAGNOSIS — N52.9 ERECTILE DYSFUNCTION, UNSPECIFIED ERECTILE DYSFUNCTION TYPE: ICD-10-CM

## 2020-09-18 NOTE — TELEPHONE ENCOUNTER
Rec'd a message from [facility] may not be covered    Let me know if he would like me to send rx elsewhere    Marcelo Maria Recommend to call [facility] or [address] [place] [address] in [place] for generic cialis

## 2020-09-18 NOTE — TELEPHONE ENCOUNTER
Pt want to do the pre auth and will wait  Pt will try the other place if the rx is to much in month

## 2020-09-18 NOTE — TELEPHONE ENCOUNTER
Petra Malik    Can you start the prior auth on cover my meds?     Let me know the key # when it is started    thx

## 2020-09-21 RX ORDER — TADALAFIL 5 MG/1
5 TABLET ORAL DAILY
Qty: 30 TABLET | Refills: 0 | Status: SHIPPED | OUTPATIENT
Start: 2020-09-21 | End: 2021-09-07

## 2020-09-21 NOTE — TELEPHONE ENCOUNTER
rx sent per pharmacy request for 30 tablets for 30 days    Waiting to see if pharmacy needs prior auth

## 2021-01-15 DIAGNOSIS — I10 ESSENTIAL HYPERTENSION: ICD-10-CM

## 2021-01-15 RX ORDER — LISINOPRIL AND HYDROCHLOROTHIAZIDE 20; 12.5 MG/1; MG/1
TABLET ORAL
Qty: 180 TABLET | Refills: 1 | Status: SHIPPED | OUTPATIENT
Start: 2021-01-15 | End: 2021-07-25 | Stop reason: SDUPTHER

## 2021-01-22 ENCOUNTER — APPOINTMENT (OUTPATIENT)
Dept: LAB | Facility: CLINIC | Age: 66
End: 2021-01-22
Payer: MEDICARE

## 2021-01-22 DIAGNOSIS — I10 ESSENTIAL HYPERTENSION: ICD-10-CM

## 2021-01-22 DIAGNOSIS — Z13.6 ENCOUNTER FOR LIPID SCREENING FOR CARDIOVASCULAR DISEASE: ICD-10-CM

## 2021-01-22 DIAGNOSIS — Z13.220 ENCOUNTER FOR LIPID SCREENING FOR CARDIOVASCULAR DISEASE: ICD-10-CM

## 2021-01-22 LAB
ALBUMIN SERPL BCP-MCNC: 3.9 G/DL (ref 3.5–5)
ALP SERPL-CCNC: 61 U/L (ref 46–116)
ALT SERPL W P-5'-P-CCNC: 79 U/L (ref 12–78)
ANION GAP SERPL CALCULATED.3IONS-SCNC: 7 MMOL/L (ref 4–13)
AST SERPL W P-5'-P-CCNC: 30 U/L (ref 5–45)
BILIRUB DIRECT SERPL-MCNC: 0.18 MG/DL (ref 0–0.2)
BILIRUB SERPL-MCNC: 1.1 MG/DL (ref 0.2–1)
BUN SERPL-MCNC: 24 MG/DL (ref 5–25)
CALCIUM SERPL-MCNC: 8.8 MG/DL (ref 8.3–10.1)
CHLORIDE SERPL-SCNC: 105 MMOL/L (ref 100–108)
CHOLEST SERPL-MCNC: 171 MG/DL (ref 50–200)
CO2 SERPL-SCNC: 30 MMOL/L (ref 21–32)
CREAT SERPL-MCNC: 1 MG/DL (ref 0.6–1.3)
GFR SERPL CREATININE-BSD FRML MDRD: 77 ML/MIN/1.73SQ M
GLUCOSE P FAST SERPL-MCNC: 87 MG/DL (ref 65–99)
HDLC SERPL-MCNC: 42 MG/DL
LDLC SERPL CALC-MCNC: 104 MG/DL (ref 0–100)
POTASSIUM SERPL-SCNC: 3.3 MMOL/L (ref 3.5–5.3)
PROT SERPL-MCNC: 7.4 G/DL (ref 6.4–8.2)
SODIUM SERPL-SCNC: 142 MMOL/L (ref 136–145)
TRIGL SERPL-MCNC: 125 MG/DL

## 2021-01-22 PROCEDURE — 80076 HEPATIC FUNCTION PANEL: CPT

## 2021-01-22 PROCEDURE — 80048 BASIC METABOLIC PNL TOTAL CA: CPT

## 2021-01-22 PROCEDURE — 36415 COLL VENOUS BLD VENIPUNCTURE: CPT

## 2021-01-22 PROCEDURE — 80061 LIPID PANEL: CPT

## 2021-01-27 ENCOUNTER — OFFICE VISIT (OUTPATIENT)
Dept: INTERNAL MEDICINE CLINIC | Facility: CLINIC | Age: 66
End: 2021-01-27
Payer: MEDICARE

## 2021-01-27 VITALS
WEIGHT: 189.4 LBS | TEMPERATURE: 98.1 F | HEIGHT: 67 IN | HEART RATE: 69 BPM | DIASTOLIC BLOOD PRESSURE: 78 MMHG | RESPIRATION RATE: 18 BRPM | OXYGEN SATURATION: 99 % | SYSTOLIC BLOOD PRESSURE: 132 MMHG | BODY MASS INDEX: 29.73 KG/M2

## 2021-01-27 DIAGNOSIS — I10 ESSENTIAL HYPERTENSION: Primary | ICD-10-CM

## 2021-01-27 DIAGNOSIS — R79.89 LOW VITAMIN D LEVEL: ICD-10-CM

## 2021-01-27 DIAGNOSIS — Z91.89 FRAMINGHAM CARDIAC RISK 10-20% IN NEXT 10 YEARS: ICD-10-CM

## 2021-01-27 DIAGNOSIS — D56.8: ICD-10-CM

## 2021-01-27 DIAGNOSIS — E78.00 ELEVATED LDL CHOLESTEROL LEVEL: ICD-10-CM

## 2021-01-27 DIAGNOSIS — E87.6 LOW SERUM POTASSIUM LEVEL: ICD-10-CM

## 2021-01-27 PROCEDURE — 99214 OFFICE O/P EST MOD 30 MIN: CPT | Performed by: INTERNAL MEDICINE

## 2021-01-27 RX ORDER — AMLODIPINE BESYLATE 10 MG/1
10 TABLET ORAL DAILY
Qty: 90 TABLET | Refills: 1 | Status: SHIPPED | OUTPATIENT
Start: 2021-01-27 | End: 2021-08-04

## 2021-01-27 RX ORDER — POTASSIUM CHLORIDE 750 MG/1
10 TABLET, EXTENDED RELEASE ORAL DAILY
Qty: 90 TABLET | Refills: 1 | Status: SHIPPED | OUTPATIENT
Start: 2021-01-27 | End: 2022-02-02

## 2021-01-27 NOTE — PROGRESS NOTES
Assessment/Plan:     Diagnoses and all orders for this visit:    Essential hypertension  Comments:  BP controlled, c/w CCB/ACEI and HCTZ  Orders:  -     amLODIPine (NORVASC) 10 mg tablet; Take 1 tablet (10 mg total) by mouth daily  -     Hepatic function panel; Future    Low vitamin D level  Comments:  improved with vit D supplement, c/w current rx/dose    Low serum potassium level  Comments:  noted on recent BW, denies any symptoms and prefers to take rx supplement rather than add'l serving of potassium rich foods  Orders:  -     potassium chloride (K-DUR,KLOR-CON) 10 mEq tablet; Take 1 tablet (10 mEq total) by mouth daily  -     Basic metabolic panel; Future    Beta+ thalassemia, normal Hb A2, type 1, silent (HCC)  Comments:  stable for sometime, re-check CBC with next BW  Orders:  -     CBC and differential; Future  -     Hepatic function panel; Future    [place] cardiac risk 10-20% in next 10 years  Comments:  discusssed primary prevention with statin therapy given elevated ASCVD risk score, he declined but will consider CT calcium score testing  Orders:  -     Lipid Panel with Direct LDL reflex; Future    Elevated LDL cholesterol level  Comments:  advised on add'l fiber intake and plant based diet  Orders:  -     Lipid Panel with Direct LDL reflex; Future  -     Hepatic function panel; Future      BMI Counseling: Body mass index is 29 66 kg/m²  The BMI is above normal  Exercise recommendations include exercising 3-5 times per week  Noted on BW: 1 point increase in ALT to 79 (normal 12-78), patient asymptomatic and taking statin, re-check in 6 mos    Subjective:      Patient ID: Bryan Baird is a 74 y o  male  HPI    Here for follow up, Milana Cushing is overall doing well  He is eating more fatty foods lately and staying at home more due to ongoing COVID-19 pandemic  He is UTD on vaccines and colonoscopy  He is taking his medications as prescribed    We reviewed his most recent BW results today notable for an increase in cholesterol, low potassium level  He denies CP/palpitations or muscle spasms/cramps  We discussed ASCVD risk score and primary prevention with taking a statin daily, which Maurilio Yañez declined  He will think about completing a coronary calcium score test(CAT scan)  ROS otherwise negative, no other complaints  Past Medical History:   Diagnosis Date    Anemia 1975    Cervicalgia 10/29/2014    Colon polyp     Prostatic hypertrophy     Benign     Vitals:    01/27/21 1053   BP: 132/78   Pulse: 69   Resp: 18   Temp: 98 1 °F (36 7 °C)   SpO2: 99%   Weight: 85 9 kg (189 lb 6 4 oz)   Height: 5' 7" (1 702 m)     Body mass index is 29 66 kg/m²      Current Outpatient Medications:     amLODIPine (NORVASC) 10 mg tablet, Take 1 tablet (10 mg total) by mouth daily, Disp: 90 tablet, Rfl: 1    Ascorbic Acid (VITAMIN C) 1000 MG tablet, Take 2 tablets by mouth daily, Disp: , Rfl:     aspirin 81 MG tablet, Take 1 tablet by mouth daily, Disp: , Rfl:     B Complex Vitamins (VITAMIN B-COMPLEX 100 IJ), Take by mouth, Disp: , Rfl:     BETA CAROTENE PO, Take by mouth, Disp: , Rfl:     CHROMIUM PO, Take by mouth, Disp: , Rfl:     coenzyme Q-10 100 MG capsule, Take by mouth, Disp: , Rfl:     Lactobacillus (ACIDOPHILUS PO), Take by mouth, Disp: , Rfl:     lisinopril-hydrochlorothiazide (PRINZIDE,ZESTORETIC) 20-12 5 MG per tablet, TAKE 2 TABLETS BY MOUTH EVERY DAY, Disp: 180 tablet, Rfl: 1    Multiple Vitamin-Folic Acid TABS, Take by mouth, Disp: , Rfl:     Omega-3 Fatty Acids (FISH OIL) 1,000 mg, Take 2 tablets by mouth daily, Disp: , Rfl:     vitamin E, tocopherol, 400 units capsule, Take 400 Units by mouth 2 (two) times a day, Disp: , Rfl:     potassium chloride (K-DUR,KLOR-CON) 10 mEq tablet, Take 1 tablet (10 mEq total) by mouth daily, Disp: 90 tablet, Rfl: 1    tadalafil (CIALIS) 5 MG tablet, Take 1 tablet (5 mg total) by mouth daily, Disp: 30 tablet, Rfl: 0  Allergies   Allergen Reactions    Albuterol Throat Swelling         Review of Systems   Constitutional: Negative for fever  HENT: Negative for congestion  Eyes: Negative for visual disturbance  Respiratory: Negative for shortness of breath  Cardiovascular: Negative for chest pain  Gastrointestinal: Negative for abdominal pain  Endocrine: Negative for polyuria  Genitourinary: Negative for difficulty urinating  Musculoskeletal: Negative for myalgias  Skin: Negative for rash  Allergic/Immunologic: Negative for immunocompromised state  Neurological: Negative for weakness  Psychiatric/Behavioral: Negative for dysphoric mood  Objective:      /78   Pulse 69   Temp 98 1 °F (36 7 °C)   Resp 18   Ht 5' 7" (1 702 m)   Wt 85 9 kg (189 lb 6 4 oz)   SpO2 99%   BMI 29 66 kg/m²          Physical Exam  Vitals signs reviewed  Constitutional:       Appearance: Normal appearance  HENT:      Head: Normocephalic and atraumatic  Right Ear: Tympanic membrane normal       Left Ear: Tympanic membrane normal    Cardiovascular:      Rate and Rhythm: Normal rate and regular rhythm  Heart sounds: No murmur  Pulmonary:      Effort: Pulmonary effort is normal       Breath sounds: No wheezing or rales  Abdominal:      General: Bowel sounds are normal       Palpations: Abdomen is soft  Tenderness: There is no abdominal tenderness  Musculoskeletal:      Right lower leg: No edema  Left lower leg: No edema  Neurological:      Mental Status: He is alert  Mental status is at baseline     Psychiatric:         Mood and Affect: Mood normal          Behavior: Behavior normal            The 10-year ASCVD risk score (Alfredo Valerio et al , 2013) is: 19 1%    Values used to calculate the score:      Age: 74 years      Sex: Male      Is Non- : No      Diabetic: No      Tobacco smoker: No      Systolic Blood Pressure: 431 mmHg      Is BP treated: Yes      HDL Cholesterol: 42 mg/dL      Total Cholesterol: 171 mg/dL    Results for orders placed or performed in visit on 01/22/21   Lipid Panel with Direct LDL reflex   Result Value Ref Range    Cholesterol 171 50 - 200 mg/dL    Triglycerides 125 <=150 mg/dL    HDL, Direct 42 >=40 mg/dL    LDL Calculated 104 (H) 0 - 100 mg/dL   Basic metabolic panel   Result Value Ref Range    Sodium 142 136 - 145 mmol/L    Potassium 3 3 (L) 3 5 - 5 3 mmol/L    Chloride 105 100 - 108 mmol/L    CO2 30 21 - 32 mmol/L    ANION GAP 7 4 - 13 mmol/L    BUN 24 5 - 25 mg/dL    Creatinine 1 00 0 60 - 1 30 mg/dL    Glucose, Fasting 87 65 - 99 mg/dL    Calcium 8 8 8 3 - 10 1 mg/dL    eGFR 77 ml/min/1 73sq m   Hepatic function panel   Result Value Ref Range    Total Bilirubin 1 10 (H) 0 20 - 1 00 mg/dL    Bilirubin, Direct 0 18 0 00 - 0 20 mg/dL    Alkaline Phosphatase 61 46 - 116 U/L    AST 30 5 - 45 U/L    ALT 79 (H) 12 - 78 U/L    Total Protein 7 4 6 4 - 8 2 g/dL    Albumin 3 9 3 5 - 5 0 g/dL

## 2021-01-27 NOTE — PATIENT INSTRUCTIONS
1  Debrox over the counter for ear wax  2  Cardiovascular risk score:  http://tools  acc org/CWDQZ-Lvdy-Fsrnhdt[facility]-Plus/#!/calculate/estimate/  3  Consider Coronary calcium test or statin  4  Call if ear does not improve  5  Blood work in July and return after blood work  6  Plant based diet with plenty of fiber    Heart Healthy Diet   AMBULATORY CARE:   A heart healthy diet  is an eating plan low in unhealthy fats and sodium (salt)  The plan is high in healthy fats and fiber  A heart healthy diet helps improve your cholesterol levels and lowers your risk for heart disease and stroke  A dietitian will teach you how to read and understand food labels  Heart healthy diet guidelines to follow:   · Choose foods that contain healthy fats  ? Unsaturated fats  include monounsaturated and polyunsaturated fats  Unsaturated fat is found in foods such as soybean, canola, olive, corn, and safflower oils  It is also found in soft tub margarine that is made with liquid vegetable oil  ? Omega-3 fat  is found in certain fish, such as salmon, tuna, and trout, and in walnuts and flaxseed  Eat fish high in omega-3 fats at least 2 times a week  · Get 20 to 30 grams of fiber each day  Fruits, vegetables, whole-grain foods, and legumes (cooked beans) are good sources of fiber  · Limit or do not have unhealthy fats  ? Cholesterol  is found in animal foods, such as eggs and lobster, and in dairy products made from whole milk  Limit cholesterol to less than 200 mg each day  ? Saturated fat  is found in meats, such as davis and hamburger  It is also found in chicken or turkey skin, whole milk, and butter  Limit saturated fat to less than 7% of your total daily calories  ? Trans fat  is found in packaged foods, such as potato chips and cookies  It is also in hard margarine, some fried foods, and shortening  Do not eat foods that contain trans fats  · Limit sodium as directed    You may be told to limit sodium to 2,000 to 2,300 mg each day  Choose low-sodium or no-salt-added foods  Add little or no salt to food you prepare  Use herbs and spices in place of salt  Include the following in your heart healthy plan:  Ask your dietitian or healthcare provider how many servings to have from each of the following food groups:  · Grains:      ? Whole-wheat breads, cereals, and pastas, and brown rice    ? Low-fat, low-sodium crackers and chips    · Vegetables:      ? Broccoli, green beans, green peas, and spinach    ? Collards, kale, and lima beans    ? Carrots, sweet potatoes, tomatoes, and peppers    ? Canned vegetables with no salt added    · Fruits:      ? Bananas, peaches, pears, and pineapple    ? Grapes, raisins, and dates    ? Oranges, tangerines, grapefruit, orange juice, and grapefruit juice    ? Apricots, mangoes, melons, and papaya    ? Raspberries and strawberries    ? Canned fruit with no added sugar    · Low-fat dairy:      ? Nonfat (skim) milk, 1% milk, and low-fat almond, cashew, or soy milks fortified with calcium    ? Low-fat cheese, regular or frozen yogurt, and cottage cheese    · Meats and proteins:      ? Lean cuts of beef and pork (loin, leg, round), skinless chicken and turkey    ? Legumes, soy products, egg whites, or nuts    Limit or do not include the following in your heart healthy plan:   · Unhealthy fats and oils:      ? Whole or 2% milk, cream cheese, sour cream, or cheese    ? High-fat cuts of beef (T-bone steaks, ribs), chicken or turkey with skin, and organ meats such as liver    ? Butter, stick margarine, shortening, and cooking oils such as coconut or palm oil    · Foods and liquids high in sodium:      ? Packaged foods, such as frozen dinners, cookies, macaroni and cheese, and cereals with more than 300 mg of sodium per serving    ? Vegetables with added sodium, such as instant potatoes, vegetables with added sauces, or regular canned vegetables    ?  Cured or smoked meats, such as hot dogs, davis, and sausage    ? High-sodium ketchup, barbecue sauce, salad dressing, pickles, olives, soy sauce, or miso    · Foods and liquids high in sugar:      ? Candy, cake, cookies, pies, or doughnuts    ? Soft drinks (soda), sports drinks, or sweetened tea    ? Canned or dry mixes for cakes, soups, sauces, or gravies    Other healthy heart guidelines:   · Do not smoke  Nicotine and other chemicals in cigarettes and cigars can cause lung and heart damage  Ask your healthcare provider for information if you currently smoke and need help to quit  E-cigarettes or smokeless tobacco still contain nicotine  Talk to your healthcare provider before you use these products  · Limit or do not drink alcohol as directed  Alcohol can damage your heart and raise your blood pressure  Your healthcare provider may give you specific daily and weekly limits  The general recommended limit is 1 drink a day for women 21 or older and for men 72 or older  Do not have more than 3 drinks in a day or 7 in a week  The recommended limit is 2 drinks a day for men 24to 59years of age  Do not have more than 4 drinks in a day or 14 in a week  A drink of alcohol is 12 ounces of beer, 5 ounces of wine, or 1½ ounces of liquor  · Exercise regularly  Exercise can help you maintain a healthy weight and improve your blood pressure and cholesterol levels  Regular exercise can also decrease your risk for heart problems  Ask your healthcare provider about the best exercise plan for you  Do not start an exercise program without asking your healthcare provider  Follow up with your doctor or cardiologist as directed:  Write down your questions so you remember to ask them during your visits  © Copyright [address] Information is for End User's use only and may not be sold, redistributed or otherwise used for commercial purposes   All illustrations and images included in CareNotes® are the copyrighted property of [employer]  or [employer]  The above information is an  only  It is not intended as medical advice for individual conditions or treatments  Talk to your doctor, nurse or pharmacist before following any medical regimen to see if it is safe and effective for you

## 2021-02-01 ENCOUNTER — VBI (OUTPATIENT)
Dept: ADMINISTRATIVE | Facility: OTHER | Age: 66
End: 2021-02-01

## 2021-02-09 ENCOUNTER — VBI (OUTPATIENT)
Dept: ADMINISTRATIVE | Facility: OTHER | Age: 66
End: 2021-02-09

## 2021-03-10 DIAGNOSIS — Z23 ENCOUNTER FOR IMMUNIZATION: ICD-10-CM

## 2021-07-21 ENCOUNTER — APPOINTMENT (OUTPATIENT)
Dept: LAB | Facility: CLINIC | Age: 66
End: 2021-07-21
Payer: MEDICARE

## 2021-07-21 DIAGNOSIS — E78.00 ELEVATED LDL CHOLESTEROL LEVEL: ICD-10-CM

## 2021-07-21 DIAGNOSIS — D56.8: ICD-10-CM

## 2021-07-21 DIAGNOSIS — I10 ESSENTIAL HYPERTENSION: ICD-10-CM

## 2021-07-21 DIAGNOSIS — Z91.89 FRAMINGHAM CARDIAC RISK 10-20% IN NEXT 10 YEARS: ICD-10-CM

## 2021-07-21 DIAGNOSIS — E87.6 LOW SERUM POTASSIUM LEVEL: ICD-10-CM

## 2021-07-21 LAB
ALBUMIN SERPL BCP-MCNC: 3.9 G/DL (ref 3.5–5)
ALP SERPL-CCNC: 64 U/L (ref 46–116)
ALT SERPL W P-5'-P-CCNC: 78 U/L (ref 12–78)
ANION GAP SERPL CALCULATED.3IONS-SCNC: 8 MMOL/L (ref 4–13)
AST SERPL W P-5'-P-CCNC: 27 U/L (ref 5–45)
BASOPHILS # BLD AUTO: 0.04 THOUSANDS/ΜL (ref 0–0.1)
BASOPHILS NFR BLD AUTO: 1 % (ref 0–1)
BILIRUB DIRECT SERPL-MCNC: 0.23 MG/DL (ref 0–0.2)
BILIRUB SERPL-MCNC: 1.19 MG/DL (ref 0.2–1)
BUN SERPL-MCNC: 19 MG/DL (ref 5–25)
CALCIUM SERPL-MCNC: 8.6 MG/DL (ref 8.3–10.1)
CHLORIDE SERPL-SCNC: 105 MMOL/L (ref 100–108)
CHOLEST SERPL-MCNC: 179 MG/DL (ref 50–200)
CO2 SERPL-SCNC: 30 MMOL/L (ref 21–32)
CREAT SERPL-MCNC: 0.96 MG/DL (ref 0.6–1.3)
EOSINOPHIL # BLD AUTO: 0.36 THOUSAND/ΜL (ref 0–0.61)
EOSINOPHIL NFR BLD AUTO: 7 % (ref 0–6)
ERYTHROCYTE [DISTWIDTH] IN BLOOD BY AUTOMATED COUNT: 17.9 % (ref 11.6–15.1)
GFR SERPL CREATININE-BSD FRML MDRD: 81 ML/MIN/1.73SQ M
GLUCOSE P FAST SERPL-MCNC: 93 MG/DL (ref 65–99)
HCT VFR BLD AUTO: 35.9 % (ref 36.5–49.3)
HDLC SERPL-MCNC: 41 MG/DL
HGB BLD-MCNC: 11.3 G/DL (ref 12–17)
IMM GRANULOCYTES # BLD AUTO: 0.02 THOUSAND/UL (ref 0–0.2)
IMM GRANULOCYTES NFR BLD AUTO: 0 % (ref 0–2)
LDLC SERPL CALC-MCNC: 113 MG/DL (ref 0–100)
LYMPHOCYTES # BLD AUTO: 0.75 THOUSANDS/ΜL (ref 0.6–4.47)
LYMPHOCYTES NFR BLD AUTO: 13 % (ref 14–44)
MCH RBC QN AUTO: 20 PG (ref 26.8–34.3)
MCHC RBC AUTO-ENTMCNC: 31.5 G/DL (ref 31.4–37.4)
MCV RBC AUTO: 63 FL (ref 82–98)
MONOCYTES # BLD AUTO: 0.53 THOUSAND/ΜL (ref 0.17–1.22)
MONOCYTES NFR BLD AUTO: 10 % (ref 4–12)
NEUTROPHILS # BLD AUTO: 3.88 THOUSANDS/ΜL (ref 1.85–7.62)
NEUTS SEG NFR BLD AUTO: 69 % (ref 43–75)
NRBC BLD AUTO-RTO: 0 /100 WBCS
PLATELET # BLD AUTO: 247 THOUSANDS/UL (ref 149–390)
PMV BLD AUTO: 9.8 FL (ref 8.9–12.7)
POTASSIUM SERPL-SCNC: 3.6 MMOL/L (ref 3.5–5.3)
PROT SERPL-MCNC: 7.4 G/DL (ref 6.4–8.2)
RBC # BLD AUTO: 5.66 MILLION/UL (ref 3.88–5.62)
SODIUM SERPL-SCNC: 143 MMOL/L (ref 136–145)
TRIGL SERPL-MCNC: 126 MG/DL
WBC # BLD AUTO: 5.58 THOUSAND/UL (ref 4.31–10.16)

## 2021-07-21 PROCEDURE — 80076 HEPATIC FUNCTION PANEL: CPT

## 2021-07-21 PROCEDURE — 80048 BASIC METABOLIC PNL TOTAL CA: CPT

## 2021-07-21 PROCEDURE — 85025 COMPLETE CBC W/AUTO DIFF WBC: CPT

## 2021-07-21 PROCEDURE — 80061 LIPID PANEL: CPT

## 2021-07-21 PROCEDURE — 36415 COLL VENOUS BLD VENIPUNCTURE: CPT

## 2021-07-28 ENCOUNTER — OFFICE VISIT (OUTPATIENT)
Dept: INTERNAL MEDICINE CLINIC | Facility: CLINIC | Age: 66
End: 2021-07-28
Payer: MEDICARE

## 2021-07-28 VITALS
BODY MASS INDEX: 29.66 KG/M2 | SYSTOLIC BLOOD PRESSURE: 132 MMHG | HEART RATE: 68 BPM | OXYGEN SATURATION: 98 % | WEIGHT: 189 LBS | DIASTOLIC BLOOD PRESSURE: 78 MMHG | RESPIRATION RATE: 16 BRPM | HEIGHT: 67 IN | TEMPERATURE: 98.1 F

## 2021-07-28 DIAGNOSIS — Z00.00 MEDICARE ANNUAL WELLNESS VISIT, SUBSEQUENT: ICD-10-CM

## 2021-07-28 DIAGNOSIS — D56.8: ICD-10-CM

## 2021-07-28 DIAGNOSIS — Z91.89 FRAMINGHAM CARDIAC RISK 10-20% IN NEXT 10 YEARS: ICD-10-CM

## 2021-07-28 DIAGNOSIS — E78.00 ELEVATED LDL CHOLESTEROL LEVEL: ICD-10-CM

## 2021-07-28 DIAGNOSIS — J30.2 SEASONAL ALLERGIES: ICD-10-CM

## 2021-07-28 DIAGNOSIS — I10 ESSENTIAL HYPERTENSION: Primary | ICD-10-CM

## 2021-07-28 DIAGNOSIS — B35.1 ONYCHOMYCOSIS OF TOENAIL: ICD-10-CM

## 2021-07-28 PROBLEM — N52.9 ERECTILE DYSFUNCTION: Status: ACTIVE | Noted: 2021-07-28

## 2021-07-28 PROCEDURE — 1123F ACP DISCUSS/DSCN MKR DOCD: CPT | Performed by: INTERNAL MEDICINE

## 2021-07-28 PROCEDURE — 99214 OFFICE O/P EST MOD 30 MIN: CPT | Performed by: INTERNAL MEDICINE

## 2021-07-28 PROCEDURE — G0439 PPPS, SUBSEQ VISIT: HCPCS | Performed by: INTERNAL MEDICINE

## 2021-07-28 NOTE — PROGRESS NOTES
Kaela Crowell is here for his Subsequent Wellness visit  Health Risk Assessment:   Patient rates overall health as very good  Patient feels that their physical health rating is same  Patient is very satisfied with their life  Eyesight was rated as same  Hearing was rated as same  Patient feels that their emotional and mental health rating is same  Patients states they are never, rarely angry  Patient states they are never, rarely unusually tired/fatigued  Pain experienced in the last 7 days has been none  Patient states that he has experienced no weight loss or gain in last 6 months  Depression Screening:   PHQ-2 Score: 0      Fall Risk Screening: In the past year, patient has experienced: no history of falling in past year      Home Safety:  Patient does not have trouble with stairs inside or outside of their home  Patient has working smoke alarms and has no working carbon monoxide detector  Home safety hazards include: none  Nutrition:   Current diet is Regular  Medications:   Patient is currently taking over-the-counter supplements  OTC medications include: Same list as last year  Patient is able to manage medications  Activities of Daily Living (ADLs)/Instrumental Activities of Daily Living (IADLs):   Walk and transfer into and out of bed and chair?: Yes  Dress and groom yourself?: Yes    Bathe or shower yourself?: Yes    Feed yourself?  Yes  Do your laundry/housekeeping?: Yes  Manage your money, pay your bills and track your expenses?: Yes  Make your own meals?: Yes    Do your own shopping?: Yes    Previous Hospitalizations:   Any hospitalizations or ED visits within the last 12 months?: No      Advance Care Planning:   Living will: No    Durable POA for healthcare: No    Advanced directive: No      PREVENTIVE SCREENINGS      Cardiovascular Screening:    General: Screening Current      Diabetes Screening:     General: Screening Current      Colorectal Cancer Screening:     General: Screening Current      Prostate Cancer Screening:    General: Screening Current      Osteoporosis Screening:    General: Screening Not Indicated      Abdominal Aortic Aneurysm (AAA) Screening:    Risk factors include: age between 73-67 yo        General: Screening Not Indicated      Lung Cancer Screening:     General: Screening Not Indicated      Hepatitis C Screening:    General: Screening Current    Screening, Brief Intervention, and Referral to Treatment (SBIRT)    Screening  Typical number of drinks in a day: 0  Typical number of drinks in a week: 3  Interpretation: Low risk drinking behavior  AUDIT-C Screenin) How often did you have a drink containing alcohol in the past year? 2 to 3 times a week  2) How many drinks did you have on a typical day when you were drinking in the past year?  1 to 2  3) How often did you have 6 or more drinks on one occasion in the past year? never    AUDIT-C Score: 3  Interpretation: Score 0-3 (male): Negative screen for alcohol misuse    Single Item Drug Screening:  How often have you used an illegal drug (including marijuana) or a prescription medication for non-medical reasons in the past year? never    Single Item Drug Screen Score: 0  Interpretation: Negative screen for possible drug use disorder

## 2021-07-28 NOTE — PROGRESS NOTES
Assessment/Plan:     Diagnoses and all orders for this visit:    Essential hypertension  Comments:  BP doing well, c/w ACEI/HCTZ/CCB  Orders:  -     CT coronary calcium score; Future    Beta+ thalassemia, normal Hb A2, type 1, silent (HCC)  Comments:  CBC stable, c/w checking every 6-12 mos    Seasonal allergies  Comments:  off zyrtec and recently worse symptoms  resume zyrtec and t/c addition of nasal steroid spray such as flonase(OTC)    Onychomycosis of toenail  Comments:  he declines oral anti-fungal due to riskout weighing benefits  trial of penlac for 8 weeks, rx ordered to his pharmacy  Orders:  -     ciclopirox (PENLAC) 8 % solution; Apply topically daily at bedtime    Elevated LDL cholesterol level  Comments:  with elevated FRS, he declines to take statin and will complete CT coronary calcium score  Orders:  -     CT coronary calcium score; Future    [place] cardiac risk 10-20% in next 10 years  -     CT coronary calcium score; Future    Medicare annual wellness visit, subsequent      BMI Counseling: Body mass index is 29 6 kg/m²  The BMI is above normal  Exercise recommendations include exercising 3-5 times per week and strength training exercises  Subjective:      Patient ID: Marko Noble is a 74 y o  male  HPI    Here for follow up, Elida Dumont is overall doing well  He stopped exercising recently when his neck started bothering him again but this has since settled down  He moved his son back to [place] from [facility] and son is living with his daughter locally  We reviewed his most recent BW results today, notable for elevated LDL  He declines statin again at this time(elevated ASCVD risk score of 19+%) but is interested in CT coronary calcium score test   He has 2 toenails that have fungus on them for years despite using OTC treatments  We discussed risk/benefit of oral anti-fungals in treating toenail fungus including risk of rare liver toxicity  ROS Otherwise negative, no other complaints      Past Medical History:   Diagnosis Date    Anemia 1975    Cervicalgia 10/29/2014    Colon polyp     Prostatic hypertrophy     Benign     Vitals:    07/28/21 1052   BP: 132/78   Pulse: 68   Resp: 16   Temp: 98 1 °F (36 7 °C)   SpO2: 98%   Weight: 85 7 kg (189 lb)   Height: 5' 7" (1 702 m)     Body mass index is 29 6 kg/m²  Current Outpatient Medications:     amLODIPine (NORVASC) 10 mg tablet, Take 1 tablet (10 mg total) by mouth daily, Disp: 90 tablet, Rfl: 1    Ascorbic Acid (VITAMIN C) 1000 MG tablet, Take 2 tablets by mouth daily, Disp: , Rfl:     aspirin 81 MG tablet, Take 1 tablet by mouth daily, Disp: , Rfl:     B Complex Vitamins (VITAMIN B-COMPLEX 100 IJ), Take by mouth, Disp: , Rfl:     BETA CAROTENE PO, Take by mouth, Disp: , Rfl:     CHROMIUM PO, Take by mouth, Disp: , Rfl:     coenzyme Q-10 100 MG capsule, Take by mouth, Disp: , Rfl:     Lactobacillus (ACIDOPHILUS PO), Take by mouth, Disp: , Rfl:     lisinopril-hydrochlorothiazide (PRINZIDE,ZESTORETIC) 20-12 5 MG per tablet, TAKE 2 TABLETS BY MOUTH EVERY DAY, Disp: 180 tablet, Rfl: 1    Multiple Vitamin-Folic Acid TABS, Take by mouth, Disp: , Rfl:     Omega-3 Fatty Acids (FISH OIL) 1,000 mg, Take 2 tablets by mouth daily, Disp: , Rfl:     potassium chloride (K-DUR,KLOR-CON) 10 mEq tablet, Take 1 tablet (10 mEq total) by mouth daily, Disp: 90 tablet, Rfl: 1    vitamin E, tocopherol, 400 units capsule, Take 400 Units by mouth 2 (two) times a day, Disp: , Rfl:     ciclopirox (PENLAC) 8 % solution, Apply topically daily at bedtime, Disp: 6 6 mL, Rfl: 1    tadalafil (CIALIS) 5 MG tablet, Take 1 tablet (5 mg total) by mouth daily, Disp: 30 tablet, Rfl: 0  Allergies   Allergen Reactions    Albuterol Throat Swelling         Review of Systems   Constitutional: Negative for fever  HENT: Positive for postnasal drip  Eyes: Negative for visual disturbance  Respiratory: Negative for shortness of breath      Cardiovascular: Negative for chest pain    Gastrointestinal: Negative for abdominal pain  Endocrine: Negative for polyuria  Genitourinary: Negative for difficulty urinating  Musculoskeletal: Positive for neck pain (but since improved/resolved)  Skin: Positive for rash (toenail fungus)  Allergic/Immunologic: Positive for environmental allergies  Neurological: Negative for weakness  Psychiatric/Behavioral: Negative for dysphoric mood  Objective:      /78   Pulse 68   Temp 98 1 °F (36 7 °C)   Resp 16   Ht 5' 7" (1 702 m)   Wt 85 7 kg (189 lb)   SpO2 98%   BMI 29 60 kg/m²          Physical Exam  Vitals reviewed  Constitutional:       Appearance: Normal appearance  HENT:      Head: Normocephalic and atraumatic  Right Ear: There is no impacted cerumen (small amt of wax in ear canal)  Left Ear: Tympanic membrane normal  There is no impacted cerumen  Eyes:      Conjunctiva/sclera: Conjunctivae normal    Cardiovascular:      Rate and Rhythm: Normal rate and regular rhythm  Heart sounds: No murmur heard  Pulmonary:      Effort: Pulmonary effort is normal       Breath sounds: No wheezing or rales  Abdominal:      General: Bowel sounds are normal       Palpations: Abdomen is soft  Tenderness: There is no abdominal tenderness  Musculoskeletal:      Right lower leg: No edema  Left lower leg: No edema  Skin:     Comments: Toenail fungus left foot 1st and 5th toe   Neurological:      Mental Status: He is alert  Mental status is at baseline     Psychiatric:         Mood and Affect: Mood normal          Behavior: Behavior normal          Results for orders placed or performed in visit on 07/21/21   Lipid Panel with Direct LDL reflex   Result Value Ref Range    Cholesterol 179 50 - 200 mg/dL    Triglycerides 126 <=150 mg/dL    HDL, Direct 41 >=40 mg/dL    LDL Calculated 113 (H) 0 - 100 mg/dL   CBC and differential   Result Value Ref Range    WBC 5 58 4 31 - 10 16 Thousand/uL    RBC 5 66 (H) 3 88 - 5 62 Million/uL    Hemoglobin 11 3 (L) 12 0 - 17 0 g/dL    Hematocrit 35 9 (L) 36 5 - 49 3 %    MCV 63 (L) 82 - 98 fL    MCH 20 0 (L) 26 8 - 34 3 pg    MCHC 31 5 31 4 - 37 4 g/dL    RDW 17 9 (H) 11 6 - 15 1 %    MPV 9 8 8 9 - 12 7 fL    Platelets 397 748 - 131 Thousands/uL    nRBC 0 /100 WBCs    Neutrophils Relative 69 43 - 75 %    Immat GRANS % 0 0 - 2 %    Lymphocytes Relative 13 (L) 14 - 44 %    Monocytes Relative 10 4 - 12 %    Eosinophils Relative 7 (H) 0 - 6 %    Basophils Relative 1 0 - 1 %    Neutrophils Absolute 3 88 1 85 - 7 62 Thousands/µL    Immature Grans Absolute 0 02 0 00 - 0 20 Thousand/uL    Lymphocytes Absolute 0 75 0 60 - 4 47 Thousands/µL    Monocytes Absolute 0 53 0 17 - 1 22 Thousand/µL    Eosinophils Absolute 0 36 0 00 - 0 61 Thousand/µL    Basophils Absolute 0 04 0 00 - 0 10 Thousands/µL   Basic metabolic panel   Result Value Ref Range    Sodium 143 136 - 145 mmol/L    Potassium 3 6 3 5 - 5 3 mmol/L    Chloride 105 100 - 108 mmol/L    CO2 30 21 - 32 mmol/L    ANION GAP 8 4 - 13 mmol/L    BUN 19 5 - 25 mg/dL    Creatinine 0 96 0 60 - 1 30 mg/dL    Glucose, Fasting 93 65 - 99 mg/dL    Calcium 8 6 8 3 - 10 1 mg/dL    eGFR 81 ml/min/1 73sq m   Hepatic function panel   Result Value Ref Range    Total Bilirubin 1 19 (H) 0 20 - 1 00 mg/dL    Bilirubin, Direct 0 23 (H) 0 00 - 0 20 mg/dL    Alkaline Phosphatase 64 46 - 116 U/L    AST 27 5 - 45 U/L    ALT 78 12 - 78 U/L    Total Protein 7 4 6 4 - 8 2 g/dL    Albumin 3 9 3 5 - 5 0 g/dL     The 10-year ASCVD risk score (Ja Avalos et al , 2013) is: 19 8%    Values used to calculate the score:      Age: 74 years      Sex: Male      Is Non- : No      Diabetic: No      Tobacco smoker: No      Systolic Blood Pressure: 644 mmHg      Is BP treated: Yes      HDL Cholesterol: 41 mg/dL      Total Cholesterol: 179 mg/dL

## 2021-07-28 NOTE — PATIENT INSTRUCTIONS
1  Return in 6 months  2  Blood work before next appointment  3  CAT scan Coronary calcium score  4  Potassium rich foods, 1-2 servings per day(see below)    Potassium Content of Foods List   AMBULATORY CARE:   Potassium  is a mineral that is found in most foods  Potassium helps to balance fluids and minerals in your body  It also helps your body maintain a normal blood pressure  Potassium helps your muscles contract and your nerves function normally  Why you may need to change the amount of potassium you eat:   · You may need more potassium  if you have hypokalemia (low potassium levels) or high blood pressure  You may also need more potassium if you are taking diuretics  Diuretics and certain medicines cause your body to lose potassium  · You may need less potassium  in your diet if you have hyperkalemia (high potassium levels) or kidney disease  Potassium content of fruit:  The amount of potassium in milligrams (mg) contained in each fruit or serving of fruit is listed beside the item  · High-potassium foods (more than 200 mg per serving):      ? 1 medium banana (425)    ? ½ of a papaya (390)    ? ½ cup of prune juice (370)    ? ¼ cup of raisins (270)    ? 1 medium shahram (325) or kiwi (240)    ? 1 small orange (240) or ½ cup of orange juice (235)    ? ½ cup of cubed cantaloupe (215) or diced honeydew melon (200)    ? 1 medium pear (200)    · Medium-potassium foods (50 to 200 mg per serving):      ? 1 medium peach (185)    ? 1 small apple or ½ cup of apple juice (150)    ? ½ cup of peaches canned in juice (120)    ? ½ cup of canned pineapple (100)    ? ½ cup of fresh, sliced strawberries (614)    ? ½ cup of watermelon (85)    · Low-potassium foods (less than 50 mg per serving):      ? ½ cup of cranberries (45) or cranberry juice cocktail (20)    ?  ½ cup of nectar of papaya, shahram, or pear (35)    Potassium content of vegetables:   · High-potassium foods (more than 200 mg per serving):      ? 1 medium baked potato, with skin (925)    ? 1 baked medium sweet potato, with skin (450)    ? ½ cup of tomato or vegetable juice (275), or 1 medium raw tomato (290)    ? ½ cup of mushrooms (280)    ? ½ cup of fresh brussels sprouts (250)    ? ½ cup of cooked zucchini (220) or winter squash (250)    ? ¼ of a medium avocado (245)    ? ½ cup of broccoli (230)    · Medium-potassium foods (50 to 200 mg per serving):      ? ½ cup of corn (195)    ? ½ cup of fresh or cooked carrots (180)    ? ½ cup of fresh cauliflower (150)    ? ½ cup of asparagus (155)    ? ½ cup of canned peas (90)     ? 1 cup of lettuce, all types (100)    ? ½ cup of fresh green beans (90)    ? ½ cup of frozen green beans (85)    ? ½ cup of cucumber (80)    Potassium content of protein foods:   · High-potassium foods (more than 200 mg per serving):      ? ½ cup of cooked valenzuela beans (400) or lentils (365)    ? 1 cup of soy milk (300)    ? 3 ounces of baked or broiled salmon (319)    ? 3 ounces of roasted turkey, dark meat (250)    ? ¼ cup of sunflower seeds (241)    ? 3 ounces of cooked lean beef (224)    ? 2 tablespoons of smooth peanut butter (210)    · Medium-potassium foods (50 to 200 mg per serving):      ? 1 ounce of salted peanuts, almonds, or cashews (200)    ? 1 large egg (60 mg)    Potassium content of dairy foods:   · High-potassium foods (more than 200 mg per serving):      ? 6 ounces of yogurt (260 to 435)    ? 1 cup of nonfat, low-fat, or whole milk (350 to 380)    · Medium-potassium foods (50 to 200 mg per serving):      ? ½ cup of ricotta cheese (154)    ? ½ cup of vanilla ice cream (131)    ?  ½ cup of low-fat (2%) cottage cheese (110)    · Low-potassium foods (less than 50 mg per serving):      ? 1 ounce of cheese (20 to 30)      Potassium content of grains:   · 1 slice of white bread (30)    · ½ cup of white or brown rice (50)    · ½ cup of spaghetti or macaroni (30)    · 1 flour or corn tortilla (50)    · 1 four-inch waffle (50)    Potassium content of other foods:   · 1 tablespoon of molasses (295)    · 1½ ounces of chocolate (165)    · Some salt substitutes may contain a high amount of potassium  Check the food label to find the amount of potassium it contains  © Copyright [employer] 2021 Information is for End User's use only and may not be sold, redistributed or otherwise used for commercial purposes  All illustrations and images included in CareNotes® are the copyrighted property of A [employer] M , Inc  or [employer]   The above information is an  only  It is not intended as medical advice for individual conditions or treatments  Talk to your doctor, nurse or pharmacist before following any medical regimen to see if it is safe and effective for you

## 2021-08-04 DIAGNOSIS — I10 ESSENTIAL HYPERTENSION: ICD-10-CM

## 2021-08-04 RX ORDER — AMLODIPINE BESYLATE 10 MG/1
TABLET ORAL
Qty: 90 TABLET | Refills: 1 | Status: SHIPPED | OUTPATIENT
Start: 2021-08-04 | End: 2022-02-02

## 2021-08-26 ENCOUNTER — HOSPITAL ENCOUNTER (OUTPATIENT)
Dept: CT IMAGING | Facility: HOSPITAL | Age: 66
Discharge: HOME | End: 2021-08-26
Payer: COMMERCIAL

## 2021-08-26 DIAGNOSIS — I10 ESSENTIAL HYPERTENSION: ICD-10-CM

## 2021-08-26 DIAGNOSIS — E78.00 ELEVATED LDL CHOLESTEROL LEVEL: ICD-10-CM

## 2021-08-26 DIAGNOSIS — Z91.89 FRAMINGHAM CARDIAC RISK 10-20% IN NEXT 10 YEARS: ICD-10-CM

## 2021-08-26 PROCEDURE — G1004 CDSM NDSC: HCPCS

## 2021-08-26 PROCEDURE — 75571 CT HRT W/O DYE W/CA TEST: CPT

## 2021-09-03 ENCOUNTER — APPOINTMENT (OUTPATIENT)
Dept: LAB | Facility: AMBULARY SURGERY CENTER | Age: 66
End: 2021-09-03
Payer: MEDICARE

## 2021-09-03 DIAGNOSIS — Z12.5 SCREENING FOR PROSTATE CANCER: ICD-10-CM

## 2021-09-03 LAB — PSA SERPL-MCNC: 2.8 NG/ML (ref 0–4)

## 2021-09-03 PROCEDURE — G0103 PSA SCREENING: HCPCS

## 2021-09-03 PROCEDURE — 36415 COLL VENOUS BLD VENIPUNCTURE: CPT

## 2021-09-07 ENCOUNTER — TELEPHONE (OUTPATIENT)
Dept: INTERNAL MEDICINE CLINIC | Facility: CLINIC | Age: 66
End: 2021-09-07

## 2021-09-07 ENCOUNTER — OFFICE VISIT (OUTPATIENT)
Dept: UROLOGY | Facility: CLINIC | Age: 66
End: 2021-09-07
Payer: MEDICARE

## 2021-09-07 VITALS
HEART RATE: 52 BPM | DIASTOLIC BLOOD PRESSURE: 82 MMHG | SYSTOLIC BLOOD PRESSURE: 144 MMHG | HEIGHT: 67 IN | BODY MASS INDEX: 28.72 KG/M2 | WEIGHT: 183 LBS

## 2021-09-07 DIAGNOSIS — Z15.89 MONOALLELIC MUTATION OF ATM GENE: ICD-10-CM

## 2021-09-07 DIAGNOSIS — E78.00 ELEVATED LDL CHOLESTEROL LEVEL: Primary | ICD-10-CM

## 2021-09-07 DIAGNOSIS — Z15.01 MONOALLELIC MUTATION OF ATM GENE: ICD-10-CM

## 2021-09-07 DIAGNOSIS — Z91.89 FRAMINGHAM CARDIAC RISK 10-20% IN NEXT 10 YEARS: ICD-10-CM

## 2021-09-07 DIAGNOSIS — Z15.09 MONOALLELIC MUTATION OF ATM GENE: ICD-10-CM

## 2021-09-07 DIAGNOSIS — Z12.5 SCREENING FOR PROSTATE CANCER: Primary | ICD-10-CM

## 2021-09-07 PROCEDURE — 99213 OFFICE O/P EST LOW 20 MIN: CPT | Performed by: PHYSICIAN ASSISTANT

## 2021-09-07 RX ORDER — ROSUVASTATIN CALCIUM 5 MG/1
5 TABLET, COATED ORAL DAILY
Qty: 30 TABLET | Refills: 5 | Status: SHIPPED | OUTPATIENT
Start: 2021-09-07 | End: 2022-03-07

## 2021-09-07 NOTE — TELEPHONE ENCOUNTER
----- Message from Payton Avendano DO sent at 9/3/2021  4:44 PM EDT -----  CAT scan for calcium score is back and is 99  A score of 100 or more qualifies Joby to take a statin  Based on his results and medical history, I still think taking a statin will benefit him  Let me know if he would like to take the rx or if he has questions

## 2021-09-07 NOTE — PROGRESS NOTES
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  - PSA from 9/3/21 was 2 8  Previously was 2 5 on 9/3/20    - GEOVANNY unremarkable  - F/u in 1 year with PSA prior to visit  History of Present Illness  Rosey Man is a 74 y o  male here for follow up evaluation of  Prostate cancer screening  Patient was found on genetic testing be to carrier of a OSMANY germline mutation  Patient's brother, he is 3 of 11, was diagnosed with early onset advanced colon cancer at age 48  This was detected on screening colonoscopy  He has been treated surgically at this point  His brother underwent genetic testing was found to be a carrier therefore underwent genetic testing as well  He to was found to be a carrier of this germ line mutation      The OSMANY gene has been linked to hereditary prostate cancer  Patient has no familial history of prostate cancer itself  He has no lower urinary tract symptoms        Patient's most recent PSA was 2 8 from 9/3/21  Previously PSA was 2 5 (09/03/2020) and  2 1 (09/25/2019)  Review of Systems   Constitutional: Negative for chills and fever  Respiratory: Negative for shortness of breath  Cardiovascular: Negative for chest pain  Gastrointestinal: Negative for abdominal pain, constipation, diarrhea, nausea and vomiting  Genitourinary: Negative for difficulty urinating, dysuria, flank pain, frequency, hematuria and urgency  Neurological: Negative for dizziness                    Past Medical History  Past Medical History:   Diagnosis Date    Anemia 1975    Cervicalgia 10/29/2014    Colon polyp     Prostatic hypertrophy     Benign       Past Social History  Past Surgical History:   Procedure Laterality Date    COLONOSCOPY      SKIN TAG REMOVAL      VASECTOMY      Vas Deferens Social History     Tobacco Use   Smoking Status Never Smoker   Smokeless Tobacco Never Used       Past Family History  Family History   Problem Relation Age of Onset    Thalassemia Mother     Heart Valve Disease Mother         heart valve replaced    Hypertension Father     Stomach cancer Father     Colon cancer Brother 48        mass in colon, suspected cancer-dx'd at 47 y/o    Alcohol abuse Neg Hx     Substance Abuse Neg Hx     Mental illness Neg Hx     Depression Neg Hx        Past Social history  Social History     Socioeconomic History    Marital status:      Spouse name: Not on file    Number of children: Not on file    Years of education: Not on file    Highest education level: Not on file   Occupational History    Occupation: Retired, IT/   Tobacco Use    Smoking status: Never Smoker    Smokeless tobacco: Never Used   Substance and Sexual Activity    Alcohol use: Yes     Comment: drinks wine    Drug use: No    Sexual activity: Not on file   Other Topics Concern    Not on file   Social History Narrative    Exercise habits    Living situations, wife and dog    Uses safety equipment- Seatbelts     Social Determinants of Health     Financial Resource Strain:     Difficulty of Paying Living Expenses:    Food Insecurity:     Worried About Running Out of Food in the Last Year:     [address] in the Last Year:    Transportation Needs:     Lack of Transportation (Medical):      Lack of Transportation (Non-Medical):    Physical Activity:     Days of Exercise per Week:     Minutes of Exercise per Session:    Stress:     Feeling of Stress :    Social Connections:     Frequency of Communication with Friends and Family:     Frequency of Social Gatherings with Friends and Family:     Attends [employer] Services:     Active Member of Clubs or Organizations:     Attends Club or Organization Meetings:     Marital Status:    Intimate Partner Violence:     Fear of Current or Ex-Partner:     Emotionally Abused:     Physically Abused:     Sexually Abused:        Current Medications  Current Outpatient Medications   Medication Sig Dispense Refill    amLODIPine (NORVASC) 10 mg tablet TAKE 1 TABLET BY MOUTH EVERY DAY 90 tablet 1    Ascorbic Acid (VITAMIN C) 1000 MG tablet Take 2 tablets by mouth daily      aspirin 81 MG tablet Take 1 tablet by mouth daily      B Complex Vitamins (VITAMIN B-COMPLEX 100 IJ) Take by mouth      BETA CAROTENE PO Take by mouth      CHROMIUM PO Take by mouth      ciclopirox (PENLAC) 8 % solution Apply topically daily at bedtime 6 6 mL 1    coenzyme Q-10 100 MG capsule Take by mouth      Lactobacillus (ACIDOPHILUS PO) Take by mouth      lisinopril-hydrochlorothiazide (PRINZIDE,ZESTORETIC) 20-12 5 MG per tablet TAKE 2 TABLETS BY MOUTH EVERY  tablet 1    Multiple Vitamin-Folic Acid TABS Take by mouth      Omega-3 Fatty Acids (FISH OIL) 1,000 mg Take 2 tablets by mouth daily      vitamin E, tocopherol, 400 units capsule Take 400 Units by mouth 2 (two) times a day      potassium chloride (K-DUR,KLOR-CON) 10 mEq tablet Take 1 tablet (10 mEq total) by mouth daily 90 tablet 1    tadalafil (CIALIS) 5 MG tablet Take 1 tablet (5 mg total) by mouth daily 30 tablet 0     No current facility-administered medications for this visit  Allergies  Allergies   Allergen Reactions    Albuterol Throat Swelling         The following portions of the patient's history were reviewed and updated as appropriate: allergies, current medications, past medical history, past social history, past surgical history and problem list       Vitals  Vitals:    09/07/21 1055   BP: 144/82   Pulse: (!) 52   Weight: 83 kg (183 lb)   Height: 5' 7" (1 702 m)           Physical Exam  Physical Exam  Constitutional:       Appearance: Normal appearance  HENT:      Head: Normocephalic and atraumatic        Right Ear: External ear normal       Left Ear: External ear normal  Eyes:      General: No scleral icterus  Conjunctiva/sclera: Conjunctivae normal    Cardiovascular:      Pulses: Normal pulses  Pulmonary:      Effort: Pulmonary effort is normal    Genitourinary:     Comments: Prostate approximately 40 g without nodules or tenderness  Hemorrhoid noted  Musculoskeletal:         General: Normal range of motion  Cervical back: Normal range of motion  Skin:     General: Skin is warm and dry  Neurological:      General: No focal deficit present  Mental Status: He is alert and oriented to person, place, and time  Psychiatric:         Mood and Affect: Mood normal          Behavior: Behavior normal          Thought Content: Thought content normal          Judgment: Judgment normal            Results  No results found for this or any previous visit (from the past 1 hour(s)) ]  Lab Results   Component Value Date    PSA 2 8 09/03/2021    PSA 2 5 09/03/2020    PSA 2 1 09/25/2019     Lab Results   Component Value Date    GLUCOSE 86 01/30/2015    CALCIUM 8 6 07/21/2021     01/30/2015    K 3 6 07/21/2021    CO2 30 07/21/2021     07/21/2021    BUN 19 07/21/2021    CREATININE 0 96 07/21/2021     Lab Results   Component Value Date    WBC 5 58 07/21/2021    HGB 11 3 (L) 07/21/2021    HCT 35 9 (L) 07/21/2021    MCV 63 (L) 07/21/2021     07/21/2021           Orders  Orders Placed This Encounter   Procedures    PSA, Total Screen     This is a patient instruction: This test is non-fasting  Please drink two glasses of water morning of bloodwork          Standing Status:   Future     Standing Expiration Date:   9/7/2025     Joyce Cui PA-C

## 2021-09-07 NOTE — TELEPHONE ENCOUNTER
Raquel    Sent rosuvastatin 5mg once a day to [facility] on file    Please advise to start taking 5mg every other day for 2 weeks, then increase to every day      Fasting BW recommended to complete in 8 weeks    Call if issues with medication(new muscle aches or other side effects)

## 2021-09-07 NOTE — TELEPHONE ENCOUNTER
Called and gave results  Please send prescription to the pharmacy on file  Patient did not have any questions

## 2021-11-09 ENCOUNTER — APPOINTMENT (OUTPATIENT)
Dept: LAB | Facility: CLINIC | Age: 66
End: 2021-11-09
Payer: MEDICARE

## 2021-11-09 DIAGNOSIS — E78.00 ELEVATED LDL CHOLESTEROL LEVEL: ICD-10-CM

## 2021-11-09 DIAGNOSIS — Z91.89 FRAMINGHAM CARDIAC RISK 10-20% IN NEXT 10 YEARS: ICD-10-CM

## 2021-11-09 LAB
ALBUMIN SERPL BCP-MCNC: 4 G/DL (ref 3.5–5)
ALP SERPL-CCNC: 62 U/L (ref 46–116)
ALT SERPL W P-5'-P-CCNC: 57 U/L (ref 12–78)
AST SERPL W P-5'-P-CCNC: 23 U/L (ref 5–45)
BILIRUB DIRECT SERPL-MCNC: 0.23 MG/DL (ref 0–0.2)
BILIRUB SERPL-MCNC: 1.19 MG/DL (ref 0.2–1)
CHOLEST SERPL-MCNC: 131 MG/DL (ref 50–200)
HDLC SERPL-MCNC: 43 MG/DL
LDLC SERPL CALC-MCNC: 71 MG/DL (ref 0–100)
PROT SERPL-MCNC: 7.4 G/DL (ref 6.4–8.2)
TRIGL SERPL-MCNC: 87 MG/DL

## 2021-11-09 PROCEDURE — 80061 LIPID PANEL: CPT

## 2021-11-09 PROCEDURE — 80076 HEPATIC FUNCTION PANEL: CPT

## 2021-11-09 PROCEDURE — 36415 COLL VENOUS BLD VENIPUNCTURE: CPT

## 2022-01-28 ENCOUNTER — OFFICE VISIT (OUTPATIENT)
Dept: INTERNAL MEDICINE CLINIC | Facility: CLINIC | Age: 67
End: 2022-01-28
Payer: MEDICARE

## 2022-01-28 VITALS
HEIGHT: 67 IN | TEMPERATURE: 98.5 F | BODY MASS INDEX: 28.82 KG/M2 | DIASTOLIC BLOOD PRESSURE: 78 MMHG | WEIGHT: 183.6 LBS | SYSTOLIC BLOOD PRESSURE: 136 MMHG | HEART RATE: 74 BPM | RESPIRATION RATE: 15 BRPM | OXYGEN SATURATION: 98 %

## 2022-01-28 DIAGNOSIS — E55.9 VITAMIN D DEFICIENCY: ICD-10-CM

## 2022-01-28 DIAGNOSIS — E78.00 ELEVATED LDL CHOLESTEROL LEVEL: ICD-10-CM

## 2022-01-28 DIAGNOSIS — I10 ESSENTIAL HYPERTENSION: Primary | ICD-10-CM

## 2022-01-28 DIAGNOSIS — D56.8: ICD-10-CM

## 2022-01-28 PROCEDURE — 99214 OFFICE O/P EST MOD 30 MIN: CPT | Performed by: INTERNAL MEDICINE

## 2022-01-28 NOTE — PATIENT INSTRUCTIONS
1  Continue current medications  2  Take potassium supplement daily  3  Return in 6 months  4   Blood work before next appointment

## 2022-01-28 NOTE — PROGRESS NOTES
Assessment/Plan:      Diagnoses and all orders for this visit:    Essential hypertension  Comments:  BP stable and on re-check by me  advised to c/w CCB/ACEI/HCTZ and to take potassium supplement once a day  BW In July 2022 & f/u OV then as well  Orders:  -     Comprehensive metabolic panel; Future  -     CBC and differential; Future    Vitamin D deficiency  Comments:  taking Vit D OTC and last level WNL, c/w rx and check vit D level with next BW  Orders:  -     Vitamin D 25 hydroxy; Future    Beta+ thalassemia, normal Hb A2, type 1, silent (HCC)  Comments:  s/p hematology eval   Hemoglobin has been stable, check again with next BW in summer 2022  Orders:  -     CBC and differential; Future    Elevated LDL cholesterol level  Comments:  taking statin and no SE, c/w rx  Orders:  -     Comprehensive metabolic panel; Future  -     Lipid Panel with Direct LDL reflex; Future          Subjective:      Patient ID: Jennifer Kyle is a 69 y o  male  HPI    Here for follow up, Cosmo Castillo is overall doing well  He is not exercising as much and gained some weight but is overall stable  He is taking his medications as prescribed  He is UTD on flu and COVID-19 vaccine booster dose  He is taking statin and has no SE  He inquires about his blood type, has never had surgery and has thalessemia, so does not donate blood  ROS Otherwise negative, no other complaints  Past Medical History:   Diagnosis Date    Anemia 1975    Cervicalgia 10/29/2014    Colon polyp     Prostatic hypertrophy     Benign     Vitals:    01/28/22 1059 01/28/22 1131   BP: 138/82 136/78   BP Location:  Left arm   Patient Position:  Sitting   Cuff Size:  Standard   Pulse: 74    Resp: 15    Temp: 98 5 °F (36 9 °C)    SpO2: 98%    Weight: 83 3 kg (183 lb 9 6 oz)    Height: 5' 7" (1 702 m)      Body mass index is 28 76 kg/m²      Current Outpatient Medications:     amLODIPine (NORVASC) 10 mg tablet, TAKE 1 TABLET BY MOUTH EVERY DAY, Disp: 90 tablet, Rfl: 1    Ascorbic Acid (VITAMIN C) 1000 MG tablet, Take 2 tablets by mouth daily, Disp: , Rfl:     aspirin 81 MG tablet, Take 1 tablet by mouth daily, Disp: , Rfl:     B Complex Vitamins (VITAMIN B-COMPLEX 100 IJ), Take by mouth, Disp: , Rfl:     BETA CAROTENE PO, Take by mouth, Disp: , Rfl:     CHROMIUM PO, Take by mouth, Disp: , Rfl:     coenzyme Q-10 100 MG capsule, Take by mouth, Disp: , Rfl:     Lactobacillus (ACIDOPHILUS PO), Take by mouth, Disp: , Rfl:     lisinopril-hydrochlorothiazide (PRINZIDE,ZESTORETIC) 20-12 5 MG per tablet, TAKE 2 TABLETS BY MOUTH EVERY DAY, Disp: 180 tablet, Rfl: 1    Multiple Vitamin-Folic Acid TABS, Take by mouth, Disp: , Rfl:     Omega-3 Fatty Acids (FISH OIL) 1,000 mg, Take 2 tablets by mouth daily, Disp: , Rfl:     rosuvastatin (CRESTOR) 5 mg tablet, Take 1 tablet (5 mg total) by mouth daily, Disp: 30 tablet, Rfl: 5    vitamin E, tocopherol, 400 units capsule, Take 400 Units by mouth 2 (two) times a day, Disp: , Rfl:     ciclopirox (PENLAC) 8 % solution, Apply topically daily at bedtime, Disp: 6 6 mL, Rfl: 1    potassium chloride (K-DUR,KLOR-CON) 10 mEq tablet, Take 1 tablet (10 mEq total) by mouth daily, Disp: 90 tablet, Rfl: 1  Allergies   Allergen Reactions    Albuterol Throat Swelling         Review of Systems   Constitutional: Negative for fever  HENT: Negative for congestion  Eyes: Negative for visual disturbance  Respiratory: Negative for shortness of breath  Cardiovascular: Negative for chest pain  Gastrointestinal: Negative for abdominal pain  Endocrine: Negative for polyuria  Genitourinary: Negative for difficulty urinating  Musculoskeletal: Negative for arthralgias  Skin: Negative for rash  Allergic/Immunologic: Negative for immunocompromised state  Neurological: Negative for weakness  Psychiatric/Behavioral: Negative for dysphoric mood           Objective:      /78 (BP Location: Left arm, Patient Position: Sitting, Cuff Size: Standard)   Pulse 74   Temp 98 5 °F (36 9 °C)   Resp 15   Ht 5' 7" (1 702 m)   Wt 83 3 kg (183 lb 9 6 oz)   SpO2 98%   BMI 28 76 kg/m²          Physical Exam  Vitals reviewed  Constitutional:       Appearance: Normal appearance  Interventions: Face mask in place  HENT:      Head: Normocephalic and atraumatic  Right Ear: Tympanic membrane normal  There is no impacted cerumen (small amt of wax in ear canal)  Left Ear: Tympanic membrane normal  There is no impacted cerumen  Eyes:      Conjunctiva/sclera: Conjunctivae normal    Cardiovascular:      Rate and Rhythm: Normal rate and regular rhythm  Heart sounds: No murmur heard  Pulmonary:      Effort: Pulmonary effort is normal       Breath sounds: No wheezing or rales  Abdominal:      General: Bowel sounds are normal       Palpations: Abdomen is soft  Tenderness: There is no abdominal tenderness  Musculoskeletal:      Right lower leg: No edema  Left lower leg: No edema  Neurological:      Mental Status: He is alert  Mental status is at baseline     Psychiatric:         Mood and Affect: Mood normal          Behavior: Behavior normal

## 2022-02-02 DIAGNOSIS — I10 ESSENTIAL HYPERTENSION: ICD-10-CM

## 2022-02-02 DIAGNOSIS — E87.6 LOW SERUM POTASSIUM LEVEL: ICD-10-CM

## 2022-02-02 RX ORDER — AMLODIPINE BESYLATE 10 MG/1
TABLET ORAL
Qty: 90 TABLET | Refills: 1 | Status: SHIPPED | OUTPATIENT
Start: 2022-02-02 | End: 2022-07-27

## 2022-02-02 RX ORDER — LISINOPRIL AND HYDROCHLOROTHIAZIDE 20; 12.5 MG/1; MG/1
TABLET ORAL
Qty: 180 TABLET | Refills: 1 | Status: SHIPPED | OUTPATIENT
Start: 2022-02-02 | End: 2022-07-27

## 2022-02-02 RX ORDER — POTASSIUM CHLORIDE 750 MG/1
TABLET, EXTENDED RELEASE ORAL
Qty: 90 TABLET | Refills: 1 | Status: SHIPPED | OUTPATIENT
Start: 2022-02-02 | End: 2022-07-27

## 2022-03-05 DIAGNOSIS — E78.00 ELEVATED LDL CHOLESTEROL LEVEL: ICD-10-CM

## 2022-03-05 DIAGNOSIS — Z91.89 FRAMINGHAM CARDIAC RISK 10-20% IN NEXT 10 YEARS: ICD-10-CM

## 2022-03-07 RX ORDER — ROSUVASTATIN CALCIUM 5 MG/1
TABLET, COATED ORAL
Qty: 90 TABLET | Refills: 1 | Status: SHIPPED | OUTPATIENT
Start: 2022-03-07

## 2022-07-26 ENCOUNTER — APPOINTMENT (OUTPATIENT)
Dept: LAB | Facility: CLINIC | Age: 67
End: 2022-07-26
Payer: MEDICARE

## 2022-07-26 DIAGNOSIS — E55.9 VITAMIN D DEFICIENCY: ICD-10-CM

## 2022-07-26 DIAGNOSIS — E78.00 ELEVATED LDL CHOLESTEROL LEVEL: ICD-10-CM

## 2022-07-26 DIAGNOSIS — D56.8: ICD-10-CM

## 2022-07-26 DIAGNOSIS — I10 ESSENTIAL HYPERTENSION: ICD-10-CM

## 2022-07-26 LAB
25(OH)D3 SERPL-MCNC: 77.7 NG/ML (ref 30–100)
ALBUMIN SERPL BCP-MCNC: 4.3 G/DL (ref 3.5–5)
ALP SERPL-CCNC: 55 U/L (ref 34–104)
ALT SERPL W P-5'-P-CCNC: 29 U/L (ref 7–52)
ANION GAP SERPL CALCULATED.3IONS-SCNC: 7 MMOL/L (ref 4–13)
AST SERPL W P-5'-P-CCNC: 18 U/L (ref 13–39)
BASOPHILS # BLD AUTO: 0.03 THOUSANDS/ΜL (ref 0–0.1)
BASOPHILS NFR BLD AUTO: 1 % (ref 0–1)
BILIRUB SERPL-MCNC: 1.28 MG/DL (ref 0.2–1)
BUN SERPL-MCNC: 18 MG/DL (ref 5–25)
CALCIUM SERPL-MCNC: 9.4 MG/DL (ref 8.4–10.2)
CHLORIDE SERPL-SCNC: 105 MMOL/L (ref 96–108)
CHOLEST SERPL-MCNC: 132 MG/DL
CO2 SERPL-SCNC: 29 MMOL/L (ref 21–32)
CREAT SERPL-MCNC: 0.88 MG/DL (ref 0.6–1.3)
EOSINOPHIL # BLD AUTO: 0.36 THOUSAND/ΜL (ref 0–0.61)
EOSINOPHIL NFR BLD AUTO: 7 % (ref 0–6)
ERYTHROCYTE [DISTWIDTH] IN BLOOD BY AUTOMATED COUNT: 18.6 % (ref 11.6–15.1)
GFR SERPL CREATININE-BSD FRML MDRD: 87 ML/MIN/1.73SQ M
GLUCOSE P FAST SERPL-MCNC: 94 MG/DL (ref 65–99)
HCT VFR BLD AUTO: 36.1 % (ref 36.5–49.3)
HDLC SERPL-MCNC: 37 MG/DL
HGB BLD-MCNC: 11.2 G/DL (ref 12–17)
IMM GRANULOCYTES # BLD AUTO: 0.03 THOUSAND/UL (ref 0–0.2)
IMM GRANULOCYTES NFR BLD AUTO: 1 % (ref 0–2)
LDLC SERPL CALC-MCNC: 68 MG/DL (ref 0–100)
LYMPHOCYTES # BLD AUTO: 0.81 THOUSANDS/ΜL (ref 0.6–4.47)
LYMPHOCYTES NFR BLD AUTO: 16 % (ref 14–44)
MCH RBC QN AUTO: 19.5 PG (ref 26.8–34.3)
MCHC RBC AUTO-ENTMCNC: 31 G/DL (ref 31.4–37.4)
MCV RBC AUTO: 63 FL (ref 82–98)
MONOCYTES # BLD AUTO: 0.33 THOUSAND/ΜL (ref 0.17–1.22)
MONOCYTES NFR BLD AUTO: 6 % (ref 4–12)
NEUTROPHILS # BLD AUTO: 3.56 THOUSANDS/ΜL (ref 1.85–7.62)
NEUTS SEG NFR BLD AUTO: 69 % (ref 43–75)
NRBC BLD AUTO-RTO: 0 /100 WBCS
PLATELET # BLD AUTO: 292 THOUSANDS/UL (ref 149–390)
PMV BLD AUTO: 10.6 FL (ref 8.9–12.7)
POTASSIUM SERPL-SCNC: 3.3 MMOL/L (ref 3.5–5.3)
PROT SERPL-MCNC: 7.1 G/DL (ref 6.4–8.4)
RBC # BLD AUTO: 5.74 MILLION/UL (ref 3.88–5.62)
SODIUM SERPL-SCNC: 141 MMOL/L (ref 135–147)
TRIGL SERPL-MCNC: 135 MG/DL
WBC # BLD AUTO: 5.12 THOUSAND/UL (ref 4.31–10.16)

## 2022-07-26 PROCEDURE — 82306 VITAMIN D 25 HYDROXY: CPT

## 2022-07-26 PROCEDURE — 36415 COLL VENOUS BLD VENIPUNCTURE: CPT

## 2022-07-26 PROCEDURE — 80061 LIPID PANEL: CPT

## 2022-07-26 PROCEDURE — 85025 COMPLETE CBC W/AUTO DIFF WBC: CPT

## 2022-07-26 PROCEDURE — 80053 COMPREHEN METABOLIC PANEL: CPT

## 2022-07-27 DIAGNOSIS — E87.6 LOW SERUM POTASSIUM LEVEL: ICD-10-CM

## 2022-07-27 DIAGNOSIS — I10 ESSENTIAL HYPERTENSION: ICD-10-CM

## 2022-07-27 RX ORDER — LISINOPRIL AND HYDROCHLOROTHIAZIDE 20; 12.5 MG/1; MG/1
TABLET ORAL
Qty: 180 TABLET | Refills: 1 | Status: SHIPPED | OUTPATIENT
Start: 2022-07-27

## 2022-07-27 RX ORDER — POTASSIUM CHLORIDE 750 MG/1
TABLET, EXTENDED RELEASE ORAL
Qty: 90 TABLET | Refills: 1 | Status: SHIPPED | OUTPATIENT
Start: 2022-07-27

## 2022-07-27 RX ORDER — AMLODIPINE BESYLATE 10 MG/1
TABLET ORAL
Qty: 90 TABLET | Refills: 1 | Status: SHIPPED | OUTPATIENT
Start: 2022-07-27

## 2022-08-02 ENCOUNTER — OFFICE VISIT (OUTPATIENT)
Dept: INTERNAL MEDICINE CLINIC | Facility: CLINIC | Age: 67
End: 2022-08-02
Payer: MEDICARE

## 2022-08-02 VITALS
WEIGHT: 195 LBS | DIASTOLIC BLOOD PRESSURE: 78 MMHG | OXYGEN SATURATION: 99 % | HEART RATE: 72 BPM | SYSTOLIC BLOOD PRESSURE: 132 MMHG | BODY MASS INDEX: 30.61 KG/M2 | RESPIRATION RATE: 15 BRPM | HEIGHT: 67 IN

## 2022-08-02 DIAGNOSIS — Z15.09 MONOALLELIC MUTATION OF ATM GENE: ICD-10-CM

## 2022-08-02 DIAGNOSIS — D56.8: ICD-10-CM

## 2022-08-02 DIAGNOSIS — Z82.79 FAMILY HISTORY OF BICUSPID AORTIC VALVE: ICD-10-CM

## 2022-08-02 DIAGNOSIS — Z15.01 MONOALLELIC MUTATION OF ATM GENE: ICD-10-CM

## 2022-08-02 DIAGNOSIS — E78.00 ELEVATED LDL CHOLESTEROL LEVEL: ICD-10-CM

## 2022-08-02 DIAGNOSIS — R01.1 SYSTOLIC MURMUR: ICD-10-CM

## 2022-08-02 DIAGNOSIS — E55.9 VITAMIN D DEFICIENCY: ICD-10-CM

## 2022-08-02 DIAGNOSIS — Z15.89 MONOALLELIC MUTATION OF ATM GENE: ICD-10-CM

## 2022-08-02 DIAGNOSIS — Z00.00 MEDICARE ANNUAL WELLNESS VISIT, SUBSEQUENT: ICD-10-CM

## 2022-08-02 DIAGNOSIS — I10 ESSENTIAL HYPERTENSION: Primary | ICD-10-CM

## 2022-08-02 PROBLEM — Z12.5 SCREENING FOR PROSTATE CANCER: Status: RESOLVED | Noted: 2019-09-11 | Resolved: 2022-08-02

## 2022-08-02 PROCEDURE — G0439 PPPS, SUBSEQ VISIT: HCPCS | Performed by: INTERNAL MEDICINE

## 2022-08-02 PROCEDURE — 99214 OFFICE O/P EST MOD 30 MIN: CPT | Performed by: INTERNAL MEDICINE

## 2022-08-02 NOTE — PROGRESS NOTES
Assessment/Plan:     Diagnoses and all orders for this visit:    Essential hypertension  Comments:  BP doing well, c/w ACEI/HCTZ  he did not take his potassium supplement on day of BW   advised to add 1 banana per day  Orders:  -     Echo complete w/ contrast if indicated; Future  -     Comprehensive metabolic panel; Future    Vitamin D deficiency  Comments:  taking 2,000 units of vit D per day, c/w rx    Monoallelic mutation of OSMANY gene    Elevated LDL cholesterol level  Comments:  with elevated ASCVD risk score, c/w statin  Orders:  -     Lipid Panel with Direct LDL reflex; Future    Beta+ thalassemia, normal Hb A2, type 1, silent (HCC)  Comments:  stable, c/w checking CBC every 6 mos  Orders:  -     CBC and differential; Future    Systolic murmur  Comments:  new onset with fhx VHD(brother)  check echocardiogram  Orders:  -     Echo complete w/ contrast if indicated; Future    Family history of bicuspid aortic valve  -     Echo complete w/ contrast if indicated; Future    Medicare annual wellness visit, subsequent          Subjective:      Patient ID: Shan Perkins is a 69 y o  male  HPI    Here for follow up, Petty Samuels is overall doing well  He is taking his medications as prescribed  He is not exercising as much lately but did some push ups today  He completed BW prior to today's appt which was reviewed with him in detail  His brother had bicuspid aortic(?) valve & recently had surgery for this  He has no hx of murmur in past and denies CP/SOB/YANES at rest or with exertion  ROS Otherwise negative, no other complaints  Past Medical History:   Diagnosis Date    Anemia 1975    Cervicalgia 10/29/2014    Colon polyp     Prostatic hypertrophy     Benign     Vitals:    08/02/22 1056   BP: 132/78   Pulse: 72   Resp: 15   SpO2: 99%   Weight: 88 5 kg (195 lb)   Height: 5' 7" (1 702 m)     Body mass index is 30 54 kg/m²      Current Outpatient Medications:     amLODIPine (NORVASC) 10 mg tablet, TAKE 1 TABLET BY MOUTH EVERY DAY, Disp: 90 tablet, Rfl: 1    Ascorbic Acid (VITAMIN C) 1000 MG tablet, Take 2 tablets by mouth daily, Disp: , Rfl:     aspirin 81 MG tablet, Take 1 tablet by mouth daily, Disp: , Rfl:     B Complex Vitamins (VITAMIN B-COMPLEX 100 IJ), Take by mouth, Disp: , Rfl:     CHROMIUM PO, Take by mouth, Disp: , Rfl:     coenzyme Q-10 100 MG capsule, Take by mouth, Disp: , Rfl:     Klor-Con M10 10 MEQ tablet, TAKE 1 TABLET BY MOUTH EVERY DAY, Disp: 90 tablet, Rfl: 1    lisinopril-hydrochlorothiazide (PRINZIDE,ZESTORETIC) 20-12 5 MG per tablet, TAKE 2 TABLETS BY MOUTH EVERY DAY, Disp: 180 tablet, Rfl: 1    Multiple Vitamin-Folic Acid TABS, Take by mouth, Disp: , Rfl:     Omega-3 Fatty Acids (FISH OIL) 1,000 mg, Take 2 tablets by mouth daily, Disp: , Rfl:     rosuvastatin (CRESTOR) 5 mg tablet, TAKE 1 TABLET BY MOUTH EVERY DAY, Disp: 90 tablet, Rfl: 1    vitamin E, tocopherol, 400 units capsule, Take 400 Units by mouth 2 (two) times a day, Disp: , Rfl:     BETA CAROTENE PO, Take by mouth, Disp: , Rfl:     Lactobacillus (ACIDOPHILUS PO), Take by mouth, Disp: , Rfl:   Allergies   Allergen Reactions    Albuterol Throat Swelling         Review of Systems   Constitutional: Negative for fever  HENT: Negative for congestion  Eyes: Negative for visual disturbance  Respiratory: Negative for shortness of breath  Cardiovascular: Negative for chest pain  Gastrointestinal: Negative for abdominal pain  Endocrine: Negative for polyuria  Genitourinary: Negative for difficulty urinating  Musculoskeletal: Negative for arthralgias  Skin: Negative for rash  Allergic/Immunologic: Negative for immunocompromised state  Neurological: Negative for dizziness  Psychiatric/Behavioral: Negative for dysphoric mood  Objective:      /78   Pulse 72   Resp 15   Ht 5' 7" (1 702 m)   Wt 88 5 kg (195 lb)   SpO2 99%   BMI 30 54 kg/m²          Physical Exam  Vitals reviewed  Constitutional:       Appearance: Normal appearance  HENT:      Head: Normocephalic and atraumatic  Right Ear: Tympanic membrane normal       Left Ear: Tympanic membrane normal    Eyes:      Conjunctiva/sclera: Conjunctivae normal    Cardiovascular:      Rate and Rhythm: Normal rate and regular rhythm  Heart sounds: Murmur heard  Systolic murmur is present with a grade of 2/6  No gallop  Pulmonary:      Effort: Pulmonary effort is normal       Breath sounds: No wheezing or rales  Abdominal:      General: Bowel sounds are normal       Palpations: Abdomen is soft  Tenderness: There is no abdominal tenderness  Musculoskeletal:      Right lower leg: No edema  Left lower leg: No edema  Neurological:      Mental Status: He is alert  Mental status is at baseline     Psychiatric:         Mood and Affect: Mood normal          Behavior: Behavior normal            Results for orders placed or performed in visit on 07/26/22   Comprehensive metabolic panel   Result Value Ref Range    Sodium 141 135 - 147 mmol/L    Potassium 3 3 (L) 3 5 - 5 3 mmol/L    Chloride 105 96 - 108 mmol/L    CO2 29 21 - 32 mmol/L    ANION GAP 7 4 - 13 mmol/L    BUN 18 5 - 25 mg/dL    Creatinine 0 88 0 60 - 1 30 mg/dL    Glucose, Fasting 94 65 - 99 mg/dL    Calcium 9 4 8 4 - 10 2 mg/dL    AST 18 13 - 39 U/L    ALT 29 7 - 52 U/L    Alkaline Phosphatase 55 34 - 104 U/L    Total Protein 7 1 6 4 - 8 4 g/dL    Albumin 4 3 3 5 - 5 0 g/dL    Total Bilirubin 1 28 (H) 0 20 - 1 00 mg/dL    eGFR 87 ml/min/1 73sq m   Lipid Panel with Direct LDL reflex   Result Value Ref Range    Cholesterol 132 See Comment mg/dL    Triglycerides 135 See Comment mg/dL    HDL, Direct 37 (L) >=40 mg/dL    LDL Calculated 68 0 - 100 mg/dL   CBC and differential   Result Value Ref Range    WBC 5 12 4 31 - 10 16 Thousand/uL    RBC 5 74 (H) 3 88 - 5 62 Million/uL    Hemoglobin 11 2 (L) 12 0 - 17 0 g/dL    Hematocrit 36 1 (L) 36 5 - 49 3 %    MCV 63 (L) 82 - 98 fL    MCH 19 5 (L) 26 8 - 34 3 pg    MCHC 31 0 (L) 31 4 - 37 4 g/dL    RDW 18 6 (H) 11 6 - 15 1 %    MPV 10 6 8 9 - 12 7 fL    Platelets 682 231 - 475 Thousands/uL    nRBC 0 /100 WBCs    Neutrophils Relative 69 43 - 75 %    Immat GRANS % 1 0 - 2 %    Lymphocytes Relative 16 14 - 44 %    Monocytes Relative 6 4 - 12 %    Eosinophils Relative 7 (H) 0 - 6 %    Basophils Relative 1 0 - 1 %    Neutrophils Absolute 3 56 1 85 - 7 62 Thousands/µL    Immature Grans Absolute 0 03 0 00 - 0 20 Thousand/uL    Lymphocytes Absolute 0 81 0 60 - 4 47 Thousands/µL    Monocytes Absolute 0 33 0 17 - 1 22 Thousand/µL    Eosinophils Absolute 0 36 0 00 - 0 61 Thousand/µL    Basophils Absolute 0 03 0 00 - 0 10 Thousands/µL   Vitamin D 25 hydroxy   Result Value Ref Range    Vit D, 25-Hydroxy 77 7 30 0 - 100 0 ng/mL

## 2022-08-02 NOTE — PATIENT INSTRUCTIONS
Echocardiogram  Return in 6 months  Medicare Preventive Visit Patient Instructions  Thank you for completing your Welcome to Medicare Visit or Medicare Annual Wellness Visit today  Your next wellness visit will be due in one year (8/3/2023)  The screening/preventive services that you may require over the next 5-10 years are detailed below  Some tests may not apply to you based off risk factors and/or age  Screening tests ordered at today's visit but not completed yet may show as past due  Also, please note that scanned in results may not display below  Preventive Screenings:  Service Recommendations Previous Testing/Comments   Colorectal Cancer Screening  Colonoscopy    Fecal Occult Blood Test (FOBT)/Fecal Immunochemical Test (FIT)  Fecal DNA/Cologuard Test  Flexible Sigmoidoscopy Age: 54-65 years old   Colonoscopy: every 10 years (May be performed more frequently if at higher risk)  OR  FOBT/FIT: every 1 year  OR  Cologuard: every 3 years  OR  Sigmoidoscopy: every 5 years  Screening may be recommended earlier than age 48 if at higher risk for colorectal cancer  Also, an individualized decision between you and your healthcare provider will decide whether screening between the ages of 74-80 would be appropriate   Colonoscopy: 08/23/2019  FOBT/FIT: Not on file  Cologuard: Not on file  Sigmoidoscopy: Not on file    Screening Current     Prostate Cancer Screening Individualized decision between patient and health care provider in men between ages of 53-78   Medicare will cover every 12 months beginning on the day after your 50th birthday PSA: 2 8 ng/mL     Screening Current     Hepatitis C Screening Once for adults born between 1945 and 1965  More frequently in patients at high risk for Hepatitis C Hep C Antibody: 07/09/2019    Screening Current   Diabetes Screening 1-2 times per year if you're at risk for diabetes or have pre-diabetes Fasting glucose: 94 mg/dL   A1C: No results in last 5 years    Screening Current Cholesterol Screening Once every 5 years if you don't have a lipid disorder  May order more often based on risk factors  Lipid panel: 07/26/2022    Screening Current      Other Preventive Screenings Covered by Medicare:  Abdominal Aortic Aneurysm (AAA) Screening: covered once if your at risk  You're considered to be at risk if you have a family history of AAA or a male between the age of 73-68 who smoking at least 100 cigarettes in your lifetime  Lung Cancer Screening: covers low dose CT scan once per year if you meet all of the following conditions: (1) Age 50-69; (2) No signs or symptoms of lung cancer; (3) Current smoker or have quit smoking within the last 15 years; (4) You have a tobacco smoking history of at least 30 pack years (packs per day x number of years you smoked); (5) You get a written order from a healthcare provider  Glaucoma Screening: covered annually if you're considered high risk: (1) You have diabetes OR (2) Family history of glaucoma OR (3)  aged 48 and older OR (3)  American aged 72 and older  Osteoporosis Screening: covered every 2 years if you meet one of the following conditions: (1) Have a vertebral abnormality; (2) On glucocorticoid therapy for more than 3 months; (3) Have primary hyperparathyroidism; (4) On osteoporosis medications and need to assess response to drug therapy  HIV Screening: covered annually if you're between the age of 12-76  Also covered annually if you are younger than 13 and older than 72 with risk factors for HIV infection  For pregnant patients, it is covered up to 3 times per pregnancy      Immunizations:  Immunization Recommendations   Influenza Vaccine Annual influenza vaccination during flu season is recommended for all persons aged >= 6 months who do not have contraindications   Pneumococcal Vaccine (Prevnar and Pneumovax)  * Prevnar = PCV13  * Pneumovax = PPSV23 Adults 25-60 years old: 1-3 doses may be recommended based on certain risk factors  Adults 72 years old: Prevnar (PCV13) vaccine recommended followed by Pneumovax (PPSV23) vaccine  If already received PPSV23 since turning 65, then PCV13 recommended at least one year after PPSV23 dose  Hepatitis B Vaccine 3 dose series if at intermediate or high risk (ex: diabetes, end stage renal disease, liver disease)   Tetanus (Td) Vaccine - COST NOT COVERED BY MEDICARE PART B Following completion of primary series, a booster dose should be given every 10 years to maintain immunity against tetanus  Td may also be given as tetanus wound prophylaxis  Tdap Vaccine - COST NOT COVERED BY MEDICARE PART B Recommended at least once for all adults  For pregnant patients, recommended with each pregnancy  Shingles Vaccine (Shingrix) - COST NOT COVERED BY MEDICARE PART B  2 shot series recommended in those aged 48 and above     Health Maintenance Due:      Topic Date Due    Colorectal Cancer Screening  08/23/2029    Hepatitis C Screening  Completed     Immunizations Due:      Topic Date Due    Influenza Vaccine (1) 09/01/2022     Advance Directives   What are advance directives? Advance directives are legal documents that state your wishes and plans for medical care  These plans are made ahead of time in case you lose your ability to make decisions for yourself  Advance directives can apply to any medical decision, such as the treatments you want, and if you want to donate organs  What are the types of advance directives? There are many types of advance directives, and each state has rules about how to use them  You may choose a combination of any of the following:  Living will: This is a written record of the treatment you want  You can also choose which treatments you do not want, which to limit, and which to stop at a certain time  This includes surgery, medicine, IV fluid, and tube feedings  Durable power of  for healthcare [facility]):   This is a written record that states who you want to make healthcare choices for you when you are unable to make them for yourself  This person, called a proxy, is usually a family member or a friend  You may choose more than 1 proxy  Do not resuscitate (DNR) order:  A DNR order is used in case your heart stops beating or you stop breathing  It is a request not to have certain forms of treatment, such as CPR  A DNR order may be included in other types of advance directives  Medical directive: This covers the care that you want if you are in a coma, near death, or unable to make decisions for yourself  You can list the treatments you want for each condition  Treatment may include pain medicine, surgery, blood transfusions, dialysis, IV or tube feedings, and a ventilator (breathing machine)  Values history: This document has questions about your views, beliefs, and how you feel and think about life  This information can help others choose the care that you would choose  Why are advance directives important? An advance directive helps you control your care  Although spoken wishes may be used, it is better to have your wishes written down  Spoken wishes can be misunderstood, or not followed  Treatments may be given even if you do not want them  An advance directive may make it easier for your family to make difficult choices about your care  Weight Management   Why it is important to manage your weight:  Being overweight increases your risk of health conditions such as heart disease, high blood pressure, type 2 diabetes, and certain types of cancer  It can also increase your risk for osteoarthritis, sleep apnea, and other respiratory problems  Aim for a slow, steady weight loss  Even a small amount of weight loss can lower your risk of health problems  How to lose weight safely:  A safe and healthy way to lose weight is to eat fewer calories and get regular exercise   You can lose up about 1 pound a week by decreasing the number of calories you eat by 500 calories each day  Healthy meal plan for weight management:  A healthy meal plan includes a variety of foods, contains fewer calories, and helps you stay healthy  A healthy meal plan includes the following:  Eat whole-grain foods more often  A healthy meal plan should contain fiber  Fiber is the part of grains, fruits, and vegetables that is not broken down by your body  Whole-grain foods are healthy and provide extra fiber in your diet  Some examples of whole-grain foods are whole-wheat breads and pastas, oatmeal, brown rice, and bulgur  Eat a variety of vegetables every day  Include dark, leafy greens such as spinach, kale, chris greens, and mustard greens  Eat yellow and orange vegetables such as carrots, sweet potatoes, and winter squash  Eat a variety of fruits every day  Choose fresh or canned fruit (canned in its own juice or light syrup) instead of juice  Fruit juice has very little or no fiber  Eat low-fat dairy foods  Drink fat-free (skim) milk or 1% milk  Eat fat-free yogurt and low-fat cottage cheese  Try low-fat cheeses such as mozzarella and other reduced-fat cheeses  Choose meat and other protein foods that are low in fat  Choose beans or other legumes such as split peas or lentils  Choose fish, skinless poultry (chicken or turkey), or lean cuts of red meat (beef or pork)  Before you cook meat or poultry, cut off any visible fat  Use less fat and oil  Try baking foods instead of frying them  Add less fat, such as margarine, sour cream, regular salad dressing and mayonnaise to foods  Eat fewer high-fat foods  Some examples of high-fat foods include french fries, doughnuts, ice cream, and cakes  Eat fewer sweets  Limit foods and drinks that are high in sugar  This includes candy, cookies, regular soda, and sweetened drinks  Exercise:  Exercise at least 30 minutes per day on most days of the week  Some examples of exercise include walking, biking, dancing, and swimming   You can also fit in more physical activity by taking the stairs instead of the elevator or parking farther away from stores  Ask your healthcare provider about the best exercise plan for you  © Copyright [employer] 2018 Information is for End User's use only and may not be sold, redistributed or otherwise used for commercial purposes   All illustrations and images included in CareNotes® are the copyrighted property of A D A M , [employer]  or [address] [place] [address]

## 2022-08-02 NOTE — PROGRESS NOTES
Assessment and Plan:     Problem List Items Addressed This Visit     Beta+ thalassemia, normal Hb A2, type 1, silent ([address] )    Relevant Orders    CBC and differential    Elevated LDL cholesterol level    Relevant Orders    Lipid Panel with Direct LDL reflex    Essential hypertension - Primary    Relevant Orders    Echo complete w/ contrast if indicated    Comprehensive metabolic panel    Monoallelic mutation of OSMANY gene    Vitamin D deficiency      Other Visit Diagnoses     Systolic murmur        new onset with fhx VHD(brother)  check echocardiogram    Relevant Orders    Echo complete w/ contrast if indicated    Family history of bicuspid aortic valve        Relevant Orders    Echo complete w/ contrast if indicated    Medicare annual wellness visit, subsequent            BMI Counseling: Body mass index is 30 54 kg/m²  The BMI is above normal  Exercise recommendations include exercising 3-5 times per week  Rationale for BMI follow-up plan is due to patient being overweight or obese  Depression Screening and Follow-up Plan: Patient was screened for depression during today's encounter  They screened negative with a PHQ-2 score of 0  Preventive health issues were discussed with patient, and age appropriate screening tests were ordered as noted in patient's After Visit Summary  Personalized health advice and appropriate referrals for health education or preventive services given if needed, as noted in patient's After Visit Summary      Patient presents for a Medicare Wellness Visit      Patient Care Team:  Jaxon Conrad DO as PCP - General (Internal Medicine)  Katey Birch MD       Problem List:     Patient Active Problem List   Diagnosis    Essential hypertension    Benign colon polyp    Beta+ thalassemia, normal Hb A2, type 1, silent ([address] )    Vitamin D deficiency    Seasonal allergies    Cervical radiculopathy    Genetic testing    Family history of colon cancer    Monoallelic mutation of OSMANY gene  Erectile dysfunction    [place] cardiac risk 10-20% in next 10 years    Elevated LDL cholesterol level      Past Medical and Surgical History:     Past Medical History:   Diagnosis Date    Anemia 1975    Cervicalgia 10/29/2014    Colon polyp     Prostatic hypertrophy     Benign     Past Surgical History:   Procedure Laterality Date    COLONOSCOPY      SKIN TAG REMOVAL      VASECTOMY      Vas Deferens      Family History:     Family History   Problem Relation Age of Onset    Thalassemia Mother     Heart Valve Disease Mother         heart valve replaced    Hypertension Father     Stomach cancer Father     Colon cancer Brother 48        mass in colon, suspected cancer-dx'd at 49 y/o    Valvular heart disease Brother         bicuspid aortic valve    Alcohol abuse Neg Hx     Substance Abuse Neg Hx     Mental illness Neg Hx     Depression Neg Hx       Social History:     Social History     Socioeconomic History    Marital status:      Spouse name: None    Number of children: None    Years of education: None    Highest education level: None   Occupational History    Occupation: Retired, IT/   Tobacco Use    Smoking status: Never Smoker    Smokeless tobacco: Never Used   Substance and Sexual Activity    Alcohol use: Yes     Comment: drinks wine    Drug use: No    Sexual activity: None   Other Topics Concern    None   Social History Narrative    Exercise habits    Living situations, wife and dog    Uses safety equipment- Seatbelts     Social Determinants of Health     Financial Resource Strain: Not on file   Food Insecurity: Not on file   Transportation Needs: Not on file   Physical Activity: Not on file   Stress: Not on file   Social Connections: Not on file   Intimate Partner Violence: Not on file   Housing Stability: Not on file      Medications and Allergies:     Current Outpatient Medications   Medication Sig Dispense Refill    amLODIPine (NORVASC) 10 mg tablet TAKE 1 TABLET BY MOUTH EVERY DAY 90 tablet 1    Ascorbic Acid (VITAMIN C) 1000 MG tablet Take 2 tablets by mouth daily      aspirin 81 MG tablet Take 1 tablet by mouth daily      B Complex Vitamins (VITAMIN B-COMPLEX 100 IJ) Take by mouth      CHROMIUM PO Take by mouth      coenzyme Q-10 100 MG capsule Take by mouth      Klor-Con M10 10 MEQ tablet TAKE 1 TABLET BY MOUTH EVERY DAY 90 tablet 1    lisinopril-hydrochlorothiazide (PRINZIDE,ZESTORETIC) 20-12 5 MG per tablet TAKE 2 TABLETS BY MOUTH EVERY  tablet 1    Multiple Vitamin-Folic Acid TABS Take by mouth      Omega-3 Fatty Acids (FISH OIL) 1,000 mg Take 2 tablets by mouth daily      rosuvastatin (CRESTOR) 5 mg tablet TAKE 1 TABLET BY MOUTH EVERY DAY 90 tablet 1    vitamin E, tocopherol, 400 units capsule Take 400 Units by mouth 2 (two) times a day      BETA CAROTENE PO Take by mouth      Lactobacillus (ACIDOPHILUS PO) Take by mouth       No current facility-administered medications for this visit  Allergies   Allergen Reactions    Albuterol Throat Swelling      Immunizations:     Immunization History   Administered Date(s) Administered    COVID-19 MODERNA VACC 0 5 ML IM 02/12/2021, 03/12/2021, 11/04/2021, 07/20/2022    INFLUENZA 11/11/2015, 11/11/2015, 10/09/2018, 07/15/2019, 11/04/2021    Influenza Split High Dose Preservative Free IM 10/09/2018    Influenza, seasonal, injectable 11/03/2015, 10/24/2017    Pneumococcal Conjugate 13-Valent 01/23/2018    Pneumococcal Polysaccharide PPV23 01/30/2019    Tdap 09/22/2014    Zoster 02/22/2018    Zoster Vaccine Recombinant 02/17/2022    influenza, trivalent, adjuvanted 10/18/2019      Health Maintenance:         Topic Date Due    Colorectal Cancer Screening  08/23/2029    Hepatitis C Screening  Completed         Topic Date Due    Influenza Vaccine (1) 09/01/2022      Medicare Screening Tests and Risk Assessments:     Abdelrahmandebi Briana is here for his Subsequent Wellness visit   Last Medicare Wellness visit information reviewed, patient interviewed and updates made to the record today  Health Risk Assessment:   Patient rates overall health as good  Patient feels that their physical health rating is same  Patient is satisfied with their life  Eyesight was rated as same  Hearing was rated as same  Patient feels that their emotional and mental health rating is same  Patients states they are never, rarely angry  Patient states they are never, rarely unusually tired/fatigued  Pain experienced in the last 7 days has been some  Patient's pain rating has been 2/10  Patient states that he has experienced no weight loss or gain in last 6 months  Depression Screening:   PHQ-2 Score: 0      Fall Risk Screening: In the past year, patient has experienced: no history of falling in past year      Home Safety:  Patient does not have trouble with stairs inside or outside of their home  Patient has working smoke alarms and has working carbon monoxide detector  Home safety hazards include: none  Nutrition:   Current diet is Regular  Medications:   Patient is currently taking over-the-counter supplements  OTC medications include: see medication list  Patient is able to manage medications  Activities of Daily Living (ADLs)/Instrumental Activities of Daily Living (IADLs):   Walk and transfer into and out of bed and chair?: Yes  Dress and groom yourself?: Yes    Bathe or shower yourself?: Yes    Feed yourself? Yes  Do your laundry/housekeeping?: Yes  Manage your money, pay your bills and track your expenses?: Yes  Make your own meals?: Yes    Do your own shopping?: Yes    Previous Hospitalizations:   Any hospitalizations or ED visits within the last 12 months?: No      Advance Care Planning:   Living will: Yes    Durable POA for healthcare:  Yes    Advanced directive: Yes    Five wishes given: Yes      PREVENTIVE SCREENINGS      Cardiovascular Screening:    General: Screening Current      Diabetes Screening:     General: Screening Current      Colorectal Cancer Screening:     General: Screening Current      Prostate Cancer Screening:    General: Screening Current      Osteoporosis Screening:    General: Screening Not Indicated      Abdominal Aortic Aneurysm (AAA) Screening:    Risk factors include: age between 73-69 yo        General: Screening Not Indicated      Lung Cancer Screening:     General: Screening Not Indicated      Hepatitis C Screening:    General: Screening Current    Screening, Brief Intervention, and Referral to Treatment (SBIRT)    Screening  Typical number of drinks in a day: 0  Typical number of drinks in a week: 0  Interpretation: Low risk drinking behavior  AUDIT-C Screenin) How often did you have a drink containing alcohol in the past year? monthly or less  2) How many drinks did you have on a typical day when you were drinking in the past year? 1 to 2  3) How often did you have 6 or more drinks on one occasion in the past year? never    AUDIT-C Score: 1  Interpretation: Score 0-3 (male): Negative screen for alcohol misuse    Single Item Drug Screening:  How often have you used an illegal drug (including marijuana) or a prescription medication for non-medical reasons in the past year? never    Single Item Drug Screen Score: 0  Interpretation: Negative screen for possible drug use disorder    Other Counseling Topics:   Calcium and vitamin D intake       /78   Pulse 72   Resp 15   Ht 5' 7" (1 702 m)   Wt 88 5 kg (195 lb)   SpO2 99%   BMI 30 54 kg/m²         Dony Quick,

## 2022-08-31 ENCOUNTER — HOSPITAL ENCOUNTER (OUTPATIENT)
Dept: NON INVASIVE DIAGNOSTICS | Facility: CLINIC | Age: 67
Discharge: HOME | End: 2022-08-31
Payer: MEDICARE

## 2022-08-31 VITALS
BODY MASS INDEX: 30.61 KG/M2 | WEIGHT: 195 LBS | DIASTOLIC BLOOD PRESSURE: 78 MMHG | HEIGHT: 67 IN | HEART RATE: 72 BPM | SYSTOLIC BLOOD PRESSURE: 132 MMHG

## 2022-08-31 DIAGNOSIS — Z82.79 FAMILY HISTORY OF BICUSPID AORTIC VALVE: ICD-10-CM

## 2022-08-31 DIAGNOSIS — I10 ESSENTIAL HYPERTENSION: ICD-10-CM

## 2022-08-31 DIAGNOSIS — R01.1 SYSTOLIC MURMUR: ICD-10-CM

## 2022-08-31 PROCEDURE — 93306 TTE W/DOPPLER COMPLETE: CPT | Performed by: INTERNAL MEDICINE

## 2022-08-31 PROCEDURE — 93306 TTE W/DOPPLER COMPLETE: CPT

## 2022-09-01 LAB
AORTIC ROOT: 3.1 CM
AORTIC VALVE MEAN VELOCITY: 10.5 M/S
ASCENDING AORTA: 3.6 CM
AV LVOT MEAN GRADIENT: 3 MMHG
AV LVOT PEAK GRADIENT: 6 MMHG
AV MEAN GRADIENT: 5 MMHG
AV PEAK GRADIENT: 10 MMHG
AV REGURGITATION PRESSURE HALF TIME: 880 MS
AV VELOCITY RATIO: 0.78
DOP CALC AO PEAK VEL: 1.57 M/S
DOP CALC AO VTI: 30.8 CM
DOP CALC LVOT PEAK VEL VTI: 24 CM
DOP CALC LVOT PEAK VEL: 1.22 M/S
DOP CALC MV VTI: 33 CM
E WAVE DECELERATION TIME: 245 MS
FRACTIONAL SHORTENING: 33 (ref 28–44)
INTERVENTRICULAR SEPTUM IN DIASTOLE (PARASTERNAL SHORT AXIS VIEW): 1.2 CM
INTERVENTRICULAR SEPTUM: 1.2 CM (ref 0.6–1.1)
LAAS-AP2: 11.5 CM2
LAAS-AP4: 8.6 CM2
LEFT ATRIUM SIZE: 3.5 CM
LEFT INTERNAL DIMENSION IN SYSTOLE: 3.1 CM (ref 2.1–4)
LEFT VENTRICULAR INTERNAL DIMENSION IN DIASTOLE: 4.6 CM (ref 3.5–6)
LEFT VENTRICULAR POSTERIOR WALL IN END DIASTOLE: 1.2 CM
LEFT VENTRICULAR STROKE VOLUME: 58 ML
LVSV (TEICH): 58 ML
MV E'TISSUE VEL-SEP: 6 CM/S
MV MEAN GRADIENT: 1 MMHG
MV PEAK A VEL: 0.99 M/S
MV PEAK E VEL: 70 CM/S
MV PEAK GRADIENT: 6 MMHG
MV STENOSIS PRESSURE HALF TIME: 71 MS
MV VALVE AREA P 1/2 METHOD: 3.1
PULMONARY REGURGITATION LATE DIASTOLIC VELOCITY: 0.01 M/S
RIGHT ATRIUM AREA SYSTOLE A4C: 8.7 CM2
RIGHT VENTRICLE ID DIMENSION: 3.1 CM
SL CV AV DECELERATION TIME RETROGRADE: 3035 MS
SL CV AV PEAK GRADIENT RETROGRADE: 72 MMHG
SL CV LEFT ATRIUM LENGTH A2C: 3.7 CM
SL CV LV EF: 65
SL CV PED ECHO LEFT VENTRICLE DIASTOLIC VOLUME (MOD BIPLANE) 2D: 97 ML
SL CV PED ECHO LEFT VENTRICLE SYSTOLIC VOLUME (MOD BIPLANE) 2D: 39 ML
TR MAX PG: 23 MMHG
TR PEAK VELOCITY: 2.4 M/S
TRICUSPID VALVE PEAK REGURGITATION VELOCITY: 2.39 M/S

## 2022-09-07 DIAGNOSIS — E78.00 ELEVATED LDL CHOLESTEROL LEVEL: ICD-10-CM

## 2022-09-07 DIAGNOSIS — Z91.89 FRAMINGHAM CARDIAC RISK 10-20% IN NEXT 10 YEARS: ICD-10-CM

## 2022-09-07 RX ORDER — ROSUVASTATIN CALCIUM 5 MG/1
TABLET, COATED ORAL
Qty: 90 TABLET | Refills: 1 | Status: SHIPPED | OUTPATIENT
Start: 2022-09-07

## 2023-01-23 DIAGNOSIS — I10 ESSENTIAL HYPERTENSION: ICD-10-CM

## 2023-01-23 DIAGNOSIS — E87.6 LOW SERUM POTASSIUM LEVEL: ICD-10-CM

## 2023-01-23 RX ORDER — AMLODIPINE BESYLATE 10 MG/1
TABLET ORAL
Qty: 90 TABLET | Refills: 1 | Status: SHIPPED | OUTPATIENT
Start: 2023-01-23

## 2023-01-23 RX ORDER — POTASSIUM CHLORIDE 750 MG/1
TABLET, EXTENDED RELEASE ORAL
Qty: 90 TABLET | Refills: 1 | Status: SHIPPED | OUTPATIENT
Start: 2023-01-23

## 2023-01-23 RX ORDER — LISINOPRIL AND HYDROCHLOROTHIAZIDE 20; 12.5 MG/1; MG/1
TABLET ORAL
Qty: 180 TABLET | Refills: 1 | Status: SHIPPED | OUTPATIENT
Start: 2023-01-23

## 2023-02-02 ENCOUNTER — APPOINTMENT (OUTPATIENT)
Dept: LAB | Facility: CLINIC | Age: 68
End: 2023-02-02

## 2023-02-02 DIAGNOSIS — E78.00 ELEVATED LDL CHOLESTEROL LEVEL: ICD-10-CM

## 2023-02-02 DIAGNOSIS — I10 ESSENTIAL HYPERTENSION: ICD-10-CM

## 2023-02-02 DIAGNOSIS — D56.8: ICD-10-CM

## 2023-02-02 LAB
ALBUMIN SERPL BCP-MCNC: 4.4 G/DL (ref 3.5–5)
ALP SERPL-CCNC: 54 U/L (ref 34–104)
ALT SERPL W P-5'-P-CCNC: 28 U/L (ref 7–52)
ANION GAP SERPL CALCULATED.3IONS-SCNC: 6 MMOL/L (ref 4–13)
AST SERPL W P-5'-P-CCNC: 17 U/L (ref 13–39)
BASOPHILS # BLD AUTO: 0.05 THOUSANDS/ÂΜL (ref 0–0.1)
BASOPHILS NFR BLD AUTO: 1 % (ref 0–1)
BILIRUB SERPL-MCNC: 1.13 MG/DL (ref 0.2–1)
BUN SERPL-MCNC: 20 MG/DL (ref 5–25)
CALCIUM SERPL-MCNC: 9.2 MG/DL (ref 8.4–10.2)
CHLORIDE SERPL-SCNC: 106 MMOL/L (ref 96–108)
CHOLEST SERPL-MCNC: 116 MG/DL
CO2 SERPL-SCNC: 30 MMOL/L (ref 21–32)
CREAT SERPL-MCNC: 0.86 MG/DL (ref 0.6–1.3)
EOSINOPHIL # BLD AUTO: 0.2 THOUSAND/ÂΜL (ref 0–0.61)
EOSINOPHIL NFR BLD AUTO: 4 % (ref 0–6)
ERYTHROCYTE [DISTWIDTH] IN BLOOD BY AUTOMATED COUNT: 17.4 % (ref 11.6–15.1)
GFR SERPL CREATININE-BSD FRML MDRD: 87 ML/MIN/1.73SQ M
GLUCOSE P FAST SERPL-MCNC: 91 MG/DL (ref 65–99)
HCT VFR BLD AUTO: 34.6 % (ref 36.5–49.3)
HDLC SERPL-MCNC: 36 MG/DL
HGB BLD-MCNC: 10.8 G/DL (ref 12–17)
IMM GRANULOCYTES # BLD AUTO: 0.02 THOUSAND/UL (ref 0–0.2)
IMM GRANULOCYTES NFR BLD AUTO: 0 % (ref 0–2)
LDLC SERPL CALC-MCNC: 59 MG/DL (ref 0–100)
LYMPHOCYTES # BLD AUTO: 0.78 THOUSANDS/ÂΜL (ref 0.6–4.47)
LYMPHOCYTES NFR BLD AUTO: 16 % (ref 14–44)
MCH RBC QN AUTO: 19.5 PG (ref 26.8–34.3)
MCHC RBC AUTO-ENTMCNC: 31.2 G/DL (ref 31.4–37.4)
MCV RBC AUTO: 63 FL (ref 82–98)
MONOCYTES # BLD AUTO: 0.39 THOUSAND/ÂΜL (ref 0.17–1.22)
MONOCYTES NFR BLD AUTO: 8 % (ref 4–12)
NEUTROPHILS # BLD AUTO: 3.38 THOUSANDS/ÂΜL (ref 1.85–7.62)
NEUTS SEG NFR BLD AUTO: 71 % (ref 43–75)
NRBC BLD AUTO-RTO: 0 /100 WBCS
PLATELET # BLD AUTO: 295 THOUSANDS/UL (ref 149–390)
PMV BLD AUTO: 10.2 FL (ref 8.9–12.7)
POTASSIUM SERPL-SCNC: 3.5 MMOL/L (ref 3.5–5.3)
PROT SERPL-MCNC: 7.1 G/DL (ref 6.4–8.4)
RBC # BLD AUTO: 5.53 MILLION/UL (ref 3.88–5.62)
SODIUM SERPL-SCNC: 142 MMOL/L (ref 135–147)
TRIGL SERPL-MCNC: 107 MG/DL
WBC # BLD AUTO: 4.82 THOUSAND/UL (ref 4.31–10.16)

## 2023-02-09 ENCOUNTER — OFFICE VISIT (OUTPATIENT)
Dept: INTERNAL MEDICINE CLINIC | Facility: CLINIC | Age: 68
End: 2023-02-09

## 2023-02-09 VITALS
WEIGHT: 193 LBS | BODY MASS INDEX: 30.29 KG/M2 | RESPIRATION RATE: 17 BRPM | DIASTOLIC BLOOD PRESSURE: 76 MMHG | HEIGHT: 67 IN | TEMPERATURE: 98 F | SYSTOLIC BLOOD PRESSURE: 138 MMHG | HEART RATE: 80 BPM | OXYGEN SATURATION: 97 %

## 2023-02-09 DIAGNOSIS — E55.9 VITAMIN D DEFICIENCY: ICD-10-CM

## 2023-02-09 DIAGNOSIS — Z12.5 SCREENING FOR PROSTATE CANCER: ICD-10-CM

## 2023-02-09 DIAGNOSIS — D56.8: Primary | ICD-10-CM

## 2023-02-09 DIAGNOSIS — K63.5 POLYP OF COLON, UNSPECIFIED PART OF COLON, UNSPECIFIED TYPE: ICD-10-CM

## 2023-02-09 DIAGNOSIS — E78.00 ELEVATED LDL CHOLESTEROL LEVEL: ICD-10-CM

## 2023-02-09 DIAGNOSIS — I10 ESSENTIAL HYPERTENSION: ICD-10-CM

## 2023-02-09 NOTE — PROGRESS NOTES
Name: Supriya Olmedo      : 1955      MRN: [id number]  Encounter Provider: Dolores Fraga DO  Encounter Date: 2023   Encounter department: [place] [address]     1  Beta+ thalassemia, normal Hb A2, type 1, silent (HCC)  Comments:  Hgb overall stable and patient denies fatigue/lack of energy  check CBC again in 6 mos  Orders:  -     CBC and differential; Future; Expected date: 2023    2  Essential hypertension  Comments:  BP doing well, c/w ACEI/HCTZ/CCB  Orders:  -     Comprehensive metabolic panel; Future; Expected date: 2023    3  Vitamin D deficiency  Comments:  taking vit D OTC, c/w rx and check level with next BW  Orders:  -     Vitamin D 25 hydroxy; Future; Expected date: 2023    4  Elevated LDL cholesterol level  Comments:  with elevated ASCVD risk score, c/w statin  Orders:  -     Lipid Panel with Direct LDL reflex; Future; Expected date: 2023    5  Polyp of colon, unspecified part of colon, unspecified type  Comments:  had polyps and has fhx colon cancer  due for repeat colonoscopy -> he will call colo-rectal to schedule  Orders:  -     Ambulatory Referral to Colorectal Surgery; Future    6  Screening for prostate cancer  Comments:  PSA ordered by urology TBD in 2022  lab order handed to Christen Plaza so he can complete      BMI Counseling: Body mass index is 30 23 kg/m²  The BMI is above normal  Exercise recommendations include exercising 3-5 times per week  Rationale for BMI follow-up plan is due to patient being overweight or obese  Depression Screening and Follow-up Plan: Patient was screened for depression during today's encounter  They screened negative with a PHQ-2 score of 0  Subjective      HPI     Here for follow up, Christen Plaza is overall doing well  He is taking his medications as prescribed  He is UTD on flu and COVID vaccines    He is due for colonoscopy due to having polyps and fhx colon cancer in past   He is trying to stay active and completed BW prior to today's appt  ROS Otherwise negative, no other complaints  Review of Systems   Constitutional: Negative for fever  HENT: Negative for congestion  Eyes: Negative for visual disturbance  Respiratory: Negative for shortness of breath  Cardiovascular: Negative for chest pain  Gastrointestinal: Negative for abdominal pain  Endocrine: Negative for polyuria  Genitourinary: Negative for difficulty urinating  Musculoskeletal: Negative for arthralgias  Skin: Negative for rash  Allergic/Immunologic: Negative for immunocompromised state  Neurological: Negative for dizziness  Psychiatric/Behavioral: Negative for dysphoric mood  Current Outpatient Medications on File Prior to Visit   Medication Sig   • amLODIPine (NORVASC) 10 mg tablet TAKE 1 TABLET BY MOUTH EVERY DAY   • Ascorbic Acid (VITAMIN C) 1000 MG tablet Take 2 tablets by mouth daily   • B Complex Vitamins (VITAMIN B-COMPLEX 100 IJ) Take by mouth   • BETA CAROTENE PO Take by mouth   • CHROMIUM PO Take by mouth   • coenzyme Q-10 100 MG capsule Take by mouth   • Klor-Con M10 10 MEQ tablet TAKE 1 TABLET BY MOUTH EVERY DAY   • Lactobacillus (ACIDOPHILUS PO) Take by mouth   • lisinopril-hydrochlorothiazide (PRINZIDE,ZESTORETIC) 20-12 5 MG per tablet TAKE 2 TABLETS BY MOUTH EVERY DAY   • Multiple Vitamin-Folic Acid TABS Take by mouth   • Omega-3 Fatty Acids (FISH OIL) 1,000 mg Take 2 tablets by mouth daily   • rosuvastatin (CRESTOR) 5 mg tablet TAKE 1 TABLET BY MOUTH EVERY DAY   • vitamin E, tocopherol, 400 units capsule Take 400 Units by mouth 2 (two) times a day   • [DISCONTINUED] aspirin 81 MG tablet Take 1 tablet by mouth daily       Objective     /76 (BP Location: Left arm, Patient Position: Sitting, Cuff Size: Adult)   Pulse 80   Temp 98 °F (36 7 °C) (Tympanic)   Resp 17   Ht 5' 7" (1 702 m)   Wt 87 5 kg (193 lb)   SpO2 97%   BMI 30 23 kg/m²     Physical Exam  Vitals reviewed  Constitutional:       General: He is not in acute distress  Appearance: Normal appearance  HENT:      Head: Normocephalic and atraumatic  Right Ear: Tympanic membrane normal       Left Ear: Tympanic membrane normal    Eyes:      Conjunctiva/sclera: Conjunctivae normal    Cardiovascular:      Rate and Rhythm: Normal rate and regular rhythm  Heart sounds: Murmur heard  Systolic murmur is present  Comments: Echo completed 8/2022  Pulmonary:      Effort: Pulmonary effort is normal       Breath sounds: No wheezing or rales  Abdominal:      General: Bowel sounds are normal       Palpations: Abdomen is soft  Tenderness: There is no abdominal tenderness  Musculoskeletal:      Cervical back: Normal range of motion  Right lower leg: No edema  Left lower leg: No edema  Neurological:      Mental Status: He is alert  Mental status is at baseline     Psychiatric:         Mood and Affect: Mood normal          Behavior: Behavior normal        Dony Quick DO

## 2023-03-02 ENCOUNTER — TELEPHONE (OUTPATIENT)
Dept: UROLOGY | Facility: MEDICAL CENTER | Age: 68
End: 2023-03-02

## 2023-03-02 DIAGNOSIS — Z12.5 PROSTATE CANCER SCREENING: ICD-10-CM

## 2023-03-02 DIAGNOSIS — Z12.5 ENCOUNTER FOR SCREENING FOR MALIGNANT NEOPLASM OF PROSTATE: ICD-10-CM

## 2023-03-02 DIAGNOSIS — Z12.5 SCREENING FOR PROSTATE CANCER: Primary | ICD-10-CM

## 2023-03-02 NOTE — TELEPHONE ENCOUNTER
Patient seen by Emilia Youngblood at [facility]    Patient called to schedule his yearly follow up  Patient is over due  He needs a new order for psa to do prior to appointment on 04/04/23        Patient can be reached at    [phone]

## 2023-03-03 NOTE — TELEPHONE ENCOUNTER
Called and spoke to patient  Informed psa was placed in chart  Patient thankful for call and verbalized understanding

## 2023-03-08 DIAGNOSIS — E78.00 ELEVATED LDL CHOLESTEROL LEVEL: ICD-10-CM

## 2023-03-08 DIAGNOSIS — Z91.89 FRAMINGHAM CARDIAC RISK 10-20% IN NEXT 10 YEARS: ICD-10-CM

## 2023-03-08 RX ORDER — ROSUVASTATIN CALCIUM 5 MG/1
TABLET, COATED ORAL
Qty: 90 TABLET | Refills: 1 | Status: SHIPPED | OUTPATIENT
Start: 2023-03-08

## 2023-03-17 ENCOUNTER — TELEPHONE (OUTPATIENT)
Age: 68
End: 2023-03-17

## 2023-03-17 NOTE — LETTER
Eldon Lagos  [facility] [address]  [facility] INSTRUCTIONS  PLEASE NOTE    AS OF JUNE 1, 2014, OUR OFFICE REQUIRES 72 HOURS NOTICE OF CANCELLATION/RESCHEDULE OF A PROCEDURE TO AVOID INCURRING A MISSED APPOINTMENT FEE  Your Colonoscopy Procedure has been scheduled at:  [address] [facility], [place] [id number] [facility], [address] [facility]    The date of your procedure is: May 26, 2023       The hospital facility will contact you the evening prior to your scheduled procedure with your arrival time  Use the bowel preparation as directed  Check with your family doctor if you are taking a blood thinner (Coumadin, Plavix, Xarelto, Pradaxa, Gingko biloba, Ginseng, Feverfew, St  Eleazar's Wort)  We suggest stopping these for 3 days  Special instructions may be needed if you are taking aspirin or any aspirin-containing medication  Check with your family physician  If you are on DIABETIC MEDICATION (tablets or insulin) your doctor may make changes in your preparation  Take all medications usual unless otherwise instructed  As always, if you have any questions or concerns, feel free to call the office  Our  staff will be happy to connect you with one of the nurses to answer any questions or address any concerns you have regarding your procedure  Do not wear any jewelry the day of your procedure including wedding rings  Arrangements must be made for a responsible party to drive you home from the procedure  If you do not have a responsible family member or friend to drive you home, the procedure will not be done  Vast or a taxi is not acceptable  It is important you notify our office of any insurance changes prior to your procedure and, if necessary, supply us with referrals from your primary care physician      COLONOSCOPY PREPARATION INSTRUCTIONS    Purchase (prescription not required):  · 238 gram bottle of Miralax® (Glycolax®)  · 4 Dulcolax® (Bisacodyl) Laxative Tablets  · 64 oz  bottle of Gatorade® or your preference of a non-carbonated clear liquid - NOT RED OR PURPLE     One Day Prior to Colonoscopy Procedure  · Nothing to eat the day before your procedure, only clear liquids  · It is important that you drink plenty of clear liquids throughout the day to prevent dehydration  Clear Liquids include:  o Water/Iced Tea/Lemonade/Gatorade®/Black Coffee or tea (no milk or creamers  o Soft drinks: orange, ginger ale, cola, Pepsi®, Sprite®, 7Up®  o Riki-Aid® (lemonade or orange flavors only)  o Strained fruit juices without pulp such as apple, white grape, white cranberry  o Jell-O®, lemon, lime or orange (no fruit or toppings)  o Popsicles, [place] Ice (No Ice Cream, sherbets, or fruit bars)  o Chicken or beef bouillon/broth  DO NOT EAT OR DRINK ANYTHING RED OR PURPLE  DO NOT DRINK ANY ALCOHOLIC BEVERAGES  DIABETIC PATIENTS: Consult your physician    At 4:00 pm, take (2) Dulcolax® (Bisacodyl) Laxative Tablets  Swallow the tablets whole with an 8 oz  glass of water  At 8:00 pm, take the additional (2) Dulcolax® (Bisacodyl) Laxative Tablets with 8 oz  of water  The package may direct you not to exceed (2) tablets at any time but for the purpose of this examination, you should take (4) total     Mix the 238 gm of Miralax® in 64 oz  of Gatorade® and shake the solution until the Miralax® is dissolved  You will drink half (32 oz) of this solution this evening, beginning between 4 and 6 o'clock  Drink 8 oz glassfuls at your own pace  It may take several hours to drink the solution  Remember to stay close to toilet facilities  DAY OF COLONOSCOPY PROCEDURE    Five (5) hours before your procedure, drink the other half (32 oz) of the Miralax®/Gatorade® mixture within a two (2) hour period  This may require you to get up very early if you are scheduled for an early procedure      NOTHING IS TO BE TAKEN BY MOUTH 3 HOURS PRIOR TO PROCEDURE  If you use an inhaler, please bring it with you to your procedure

## 2023-03-17 NOTE — TELEPHONE ENCOUNTER
PT called to schedule recall colonoscopy, last done 8/23/2019 by Dr Alcon William,  PT's info verified

## 2023-03-22 ENCOUNTER — PREP FOR PROCEDURE (OUTPATIENT)
Age: 68
End: 2023-03-22

## 2023-03-22 DIAGNOSIS — Z86.010 PERSONAL HISTORY OF COLONIC POLYPS: Primary | ICD-10-CM

## 2023-03-22 NOTE — TELEPHONE ENCOUNTER
CE 3 yr recall, last fc 8/23/19 protruding grade 4 hemorrhoids, hyperplastic x2, BMI 30     Scheduled DB 5/26 [facility]   Mailed PW to patient     Additional Information  • Negative: Is this a new patient under the age of 48? • Negative: Is this a patient over the age of 76 that has never had a colonoscopy? • Negative: Is this patient over the age of [de-identified] with a history of cancer and/or polyps? • Negative: Has the patient had a colonoscopy within the last year and when was it? • Affirmative: Does the patient have a family history of colon or rectal cancer? • Negative: Does the patient experience chest pain or shortness of breath when climbing stairs? • Negative: Has the patient had a cardiac procedure within the last year? • Negative: Has the patient had a stroke or blood clots? • Negative: Is the patient currently on any blood thinners such as Coumadin, Plavix, Eliquis, Pradaxa, Xarelto, etc?  (Check the patient's current medication list and document reason for taking medication)  • Negative: Advised Patient - Initial Assessment  1  Patient advised that our office requires 72 hours notice for a cancellation of a procedure to avoid incurring a missed appointment fee  2  Asked patient to please contact insurance company to ask how they will be processing the individual claim for the procedure  3  Advised patient that he is welcome to see the doctor in the office prior to the procedure  4  Advised patient to be at the location of the procedure at 30 minutes prior to the scheduled time      Protocols used: [facility] COLONOSCOPY SCHEDULING

## 2023-03-28 ENCOUNTER — APPOINTMENT (OUTPATIENT)
Dept: LAB | Facility: AMBULARY SURGERY CENTER | Age: 68
End: 2023-03-28

## 2023-03-28 DIAGNOSIS — Z12.5 SCREENING FOR PROSTATE CANCER: ICD-10-CM

## 2023-03-28 LAB — PSA SERPL-MCNC: 4 NG/ML (ref 0–4)

## 2023-04-04 ENCOUNTER — OFFICE VISIT (OUTPATIENT)
Dept: PODIATRY | Facility: CLINIC | Age: 68
End: 2023-04-04

## 2023-04-04 ENCOUNTER — OFFICE VISIT (OUTPATIENT)
Dept: UROLOGY | Facility: CLINIC | Age: 68
End: 2023-04-04

## 2023-04-04 VITALS
SYSTOLIC BLOOD PRESSURE: 158 MMHG | HEIGHT: 67 IN | HEART RATE: 65 BPM | DIASTOLIC BLOOD PRESSURE: 77 MMHG | BODY MASS INDEX: 30.29 KG/M2 | WEIGHT: 193 LBS

## 2023-04-04 VITALS
SYSTOLIC BLOOD PRESSURE: 136 MMHG | OXYGEN SATURATION: 97 % | DIASTOLIC BLOOD PRESSURE: 74 MMHG | HEART RATE: 82 BPM | BODY MASS INDEX: 30.29 KG/M2 | WEIGHT: 193 LBS | HEIGHT: 67 IN

## 2023-04-04 DIAGNOSIS — M79.674 PAIN IN TOE OF RIGHT FOOT: ICD-10-CM

## 2023-04-04 DIAGNOSIS — R97.20 ELEVATED PSA: Primary | ICD-10-CM

## 2023-04-04 DIAGNOSIS — L03.031 CELLULITIS AND ABSCESS OF TOE, RIGHT: Primary | ICD-10-CM

## 2023-04-04 DIAGNOSIS — L02.611 CELLULITIS AND ABSCESS OF TOE, RIGHT: Primary | ICD-10-CM

## 2023-04-04 RX ORDER — CEPHALEXIN 500 MG/1
500 CAPSULE ORAL EVERY 8 HOURS SCHEDULED
Qty: 21 CAPSULE | Refills: 0 | Status: SHIPPED | OUTPATIENT
Start: 2023-04-04 | End: 2023-04-11

## 2023-04-04 NOTE — PROGRESS NOTES
UROLOGY PROGRESS NOTE   Patient Identifiers: Aye Jacome (MRN [id number])  Date of Service: 4/4/2023    Subjective:   57-year-old male with history of elevated PSA  He has the OSMANY germ cell mutation which has a link to increased cancer risk  His brother has colon cancer  His last PSA was 2 8 in   2021  His current PSA is 4 0  He has no significant outlet symptoms  There is no family history of prostate cancer      Reason for visit: Prostate cancer screening and elevated PSA    Objective:     VITALS:    Vitals:    04/04/23 1122   BP: 136/74   Pulse: 82   SpO2: 97%     AUA SYMPTOM SCORE    Flowsheet Row Most Recent Value   AUA SYMPTOM SCORE    How often have you had a sensation of not emptying your bladder completely after you finished urinating? 0 (P)     How often have you had to urinate again less than two hours after you finished urinating? 0 (P)     How often have you found you stopped and started again several times when you urinate? 1 (P)     How often have you found it difficult to postpone urination? 0 (P)     How often have you had a weak urinary stream? 1 (P)     How often have you had to push or strain to begin urination? 1 (P)     How many times did you most typically get up to urinate from the time you went to bed at night until the time you got up in the morning? 0 (P)     Quality of Life: If you were to spend the rest of your life with your urinary condition just the way it is now, how would you feel about that? 1 (P)     AUA SYMPTOM SCORE 3 (P)             LABS:  Lab Results   Component Value Date    HGB 10 8 (L) 02/02/2023    HCT 34 6 (L) 02/02/2023    WBC 4 82 02/02/2023     02/02/2023   ]    Lab Results   Component Value Date     01/30/2015    K 3 5 02/02/2023     02/02/2023    CO2 30 02/02/2023    BUN 20 02/02/2023    CREATININE 0 86 02/02/2023    CALCIUM 9 2 02/02/2023    GLUCOSE 86 01/30/2015   ]        INPATIENT MEDS:    Current Outpatient Medications:   • "amLODIPine (NORVASC) 10 mg tablet, TAKE 1 TABLET BY MOUTH EVERY DAY, Disp: 90 tablet, Rfl: 1  •  Ascorbic Acid (VITAMIN C) 1000 MG tablet, Take 2 tablets by mouth daily, Disp: , Rfl:   •  B Complex Vitamins (VITAMIN B-COMPLEX 100 IJ), Take by mouth, Disp: , Rfl:   •  BETA CAROTENE PO, Take by mouth, Disp: , Rfl:   •  CHROMIUM PO, Take by mouth, Disp: , Rfl:   •  coenzyme Q-10 100 MG capsule, Take by mouth, Disp: , Rfl:   •  Klor-Con M10 10 MEQ tablet, TAKE 1 TABLET BY MOUTH EVERY DAY, Disp: 90 tablet, Rfl: 1  •  Lactobacillus (ACIDOPHILUS PO), Take by mouth, Disp: , Rfl:   •  lisinopril-hydrochlorothiazide (PRINZIDE,ZESTORETIC) 20-12 5 MG per tablet, TAKE 2 TABLETS BY MOUTH EVERY DAY, Disp: 180 tablet, Rfl: 1  •  Multiple Vitamin-Folic Acid TABS, Take by mouth, Disp: , Rfl:   •  Omega-3 Fatty Acids (FISH OIL) 1,000 mg, Take 2 tablets by mouth daily, Disp: , Rfl:   •  rosuvastatin (CRESTOR) 5 mg tablet, TAKE 1 TABLET BY MOUTH EVERY DAY, Disp: 90 tablet, Rfl: 1  •  vitamin E, tocopherol, 400 units capsule, Take 400 Units by mouth 2 (two) times a day, Disp: , Rfl:       Physical Exam:   /74 (BP Location: Left arm, Patient Position: Sitting)   Pulse 82   Ht 5' 7\" (1 702 m)   Wt 87 5 kg (193 lb)   SpO2 97%   BMI 30 23 kg/m²   GEN: no acute distress    RESP: breathing comfortably with no accessory muscle use    ABD: soft, non-tender, non-distended   INCISION:    EXT: no significant peripheral edema   (Male): Penis uncircumcised, phallus normal, meatus patent  Testicles descended into scrotum bilaterally without masses nor tenderness  No inguinal hernias bilaterally  GEOVANNY: Prostate is enlarged at 45 grams  The prostate is not boggy  The prostate is not tender  No nodules noted      RADIOLOGY:   None    Assessment:   #1    Elevated PSA    Plan:   -I reviewed the findings with the patient  -In view of his family history I recommend he have a multiparametric MRI now  -We reviewed the possible results and I will " call him for PI-RADS 3, 4 or 5 and arrange for a prostate biopsy  -Otherwise I will see him in 6 months with PSA prior to visit

## 2023-04-04 NOTE — PROGRESS NOTES
"               PATIENT:  Karon Opitz  1955       ASSESSMENT:     1  Cellulitis and abscess of toe, right  cephalexin (KEFLEX) 500 mg capsule    Nail removal      2  Pain in toe of right foot                  PLAN:  1  Reviewed medical records/  Patient was counseled and educated on the condition and the diagnosis  2  The diagnosis, treatment options and prognosis were discussed with the patient  3  He has chronic infection around proximal nail fold  Recommended total nail avulsion  See procedure  4  Home care instructions were given to the patient including decreased activity, ice and home pain medication as needed for 3 days  Patient will also perform daily dressing changes until the surgical site is fully healed  5  Rx: Keflex  6  Patient will return in 2 weeks for re-evaluation  Imaging: I have personally reviewed pertinent films in PACS  Labs, pathology, and Other Studies: I have personally reviewed pertinent reports  Nail removal    Date/Time: 4/4/2023 4:22 PM  Performed by: Amie Soria DPM  Authorized by: Amie Soria DPM     Patient location:  ClinicUniversal Protocol:  Consent: Verbal consent obtained  Risks and benefits: risks, benefits and alternatives were discussed  Consent given by: patient  Time out: Immediately prior to procedure a \"time out\" was called to verify the correct patient, procedure, equipment, support staff and site/side marked as required  Timeout called at: 4/4/2023 4:22 PM   Patient understanding: patient states understanding of the procedure being performed  Site marked: the operative site was marked  Patient identity confirmed: verbally with patient      Location:     Foot:  R big toe  Pre-procedure details:     Skin preparation:  Betadine  Anesthesia (see MAR for exact dosages):      Anesthesia method:  Local infiltration    Local anesthetic:  Lidocaine 1% w/o epi  Nail Removal:     Nail removed:  Complete  Post-procedure details:     Dressing:  4x4 " sterile gauze, antibiotic ointment and gauze roll    Patient tolerance of procedure: Tolerated well, no immediate complications          Subjective:       HPI  The patient presents with chief complaint of pain in right great toe  He had it for about 3 weeks with pain and redness  Some drainage noted  He has been putting antibiotic cream and superglue to help with the problem  It has been getting worse in the last few days  He does not recall any injury  No history of diabetes  No associated numbness or paresthesia  No significant weakness or dysfunction  The following portions of the patient's history were reviewed and updated as appropriate: allergies, current medications, past family history, past medical history, past social history, past surgical history and problem list   All pertinent labs and images were reviewed        Past Medical History  Past Medical History:   Diagnosis Date   • Allergic 1998   • Anemia 1975   • Cervicalgia 10/29/2014   • Colon polyp    • Hypertension 2006   • Prostatic hypertrophy     Benign       Past Surgical History  Past Surgical History:   Procedure Laterality Date   • COLONOSCOPY     • SKIN TAG REMOVAL     • VASECTOMY      Vas Deferens        Allergies:  Albuterol    Medications:  Current Outpatient Medications   Medication Sig Dispense Refill   • amLODIPine (NORVASC) 10 mg tablet TAKE 1 TABLET BY MOUTH EVERY DAY 90 tablet 1   • Ascorbic Acid (VITAMIN C) 1000 MG tablet Take 2 tablets by mouth daily     • B Complex Vitamins (VITAMIN B-COMPLEX 100 IJ) Take by mouth     • BETA CAROTENE PO Take by mouth     • cephalexin (KEFLEX) 500 mg capsule Take 1 capsule (500 mg total) by mouth every 8 (eight) hours for 7 days 21 capsule 0   • CHROMIUM PO Take by mouth     • coenzyme Q-10 100 MG capsule Take by mouth     • Klor-Con M10 10 MEQ tablet TAKE 1 TABLET BY MOUTH EVERY DAY 90 tablet 1   • Lactobacillus (ACIDOPHILUS PO) Take by mouth     • lisinopril-hydrochlorothiazide (PRINZIDE,ZESTORETIC) 20-12 5 MG per tablet TAKE 2 TABLETS BY MOUTH EVERY  tablet 1   • Multiple Vitamin-Folic Acid TABS Take by mouth     • Omega-3 Fatty Acids (FISH OIL) 1,000 mg Take 2 tablets by mouth daily     • rosuvastatin (CRESTOR) 5 mg tablet TAKE 1 TABLET BY MOUTH EVERY DAY 90 tablet 1   • vitamin E, tocopherol, 400 units capsule Take 400 Units by mouth 2 (two) times a day       No current facility-administered medications for this visit  Social History:  Social History     Socioeconomic History   • Marital status:      Spouse name: None   • Number of children: None   • Years of education: None   • Highest education level: None   Occupational History   • Occupation: Retired, IT/   Tobacco Use   • Smoking status: Never   • Smokeless tobacco: Never   Substance and Sexual Activity   • Alcohol use: Yes     Alcohol/week: 5 0 standard drinks     Types: 3 Glasses of wine, 2 Cans of beer per week     Comment: drinks wine   • Drug use: No   • Sexual activity: Not Currently   Other Topics Concern   • None   Social History Narrative    Exercise habits    Living situations, wife and dog    Uses safety equipment- Seatbelts     Social Determinants of Health     Financial Resource Strain: Not on file   Food Insecurity: Not on file   Transportation Needs: Not on file   Physical Activity: Not on file   Stress: Not on file   Social Connections: Not on file   Intimate Partner Violence: Not on file   Housing Stability: Not on file          Review of Systems   Constitutional: Negative for chills and fever  HENT: Negative for sore throat  Respiratory: Negative for cough and shortness of breath  Cardiovascular: Negative for chest pain and leg swelling  Gastrointestinal: Negative for nausea and vomiting  Musculoskeletal: Negative for gait problem  Allergic/Immunologic: Negative for immunocompromised state  Neurological: Negative for weakness and numbness  Hematological: Negative  "  Psychiatric/Behavioral: Negative for behavioral problems and confusion  Objective:      /77 (BP Location: Left arm, Patient Position: Sitting, Cuff Size: Standard)   Pulse 65   Ht 5' 7\" (1 702 m)   Wt 87 5 kg (193 lb)   BMI 30 23 kg/m²          Physical Exam  Vitals reviewed  Constitutional:       General: He is not in acute distress  Appearance: He is not ill-appearing or toxic-appearing  HENT:      Head: Normocephalic and atraumatic  Eyes:      Extraocular Movements: Extraocular movements intact  Cardiovascular:      Rate and Rhythm: Normal rate and regular rhythm  Pulses: Normal pulses  Dorsalis pedis pulses are 2+ on the right side and 2+ on the left side  Posterior tibial pulses are 2+ on the right side and 2+ on the left side  Pulmonary:      Effort: Pulmonary effort is normal  No respiratory distress  Musculoskeletal:         General: No signs of injury  Normal range of motion  Cervical back: Normal range of motion and neck supple  Right lower leg: No edema  Left lower leg: No edema  Right foot: No foot drop  Left foot: No foot drop  Skin:     General: Skin is warm  Capillary Refill: Capillary refill takes less than 2 seconds  Coloration: Skin is not cyanotic or mottled  Findings: No laceration  Nails: There is no clubbing  Comments: Redness, swelling, and pain around proximal nail fold right great toe  Maceration presents as well  No areli pus  Loosening of nail right great toe proximally  Neurological:      General: No focal deficit present  Mental Status: He is alert and oriented to person, place, and time  Cranial Nerves: No cranial nerve deficit  Sensory: No sensory deficit  Motor: No weakness  Coordination: Coordination normal    Psychiatric:         Mood and Affect: Mood normal          Behavior: Behavior normal          Thought Content:  Thought content normal  "

## 2023-05-26 ENCOUNTER — ANESTHESIA EVENT (OUTPATIENT)
Dept: GASTROENTEROLOGY | Facility: AMBULARY SURGERY CENTER | Age: 68
End: 2023-05-26

## 2023-05-26 ENCOUNTER — HOSPITAL ENCOUNTER (OUTPATIENT)
Dept: GASTROENTEROLOGY | Facility: AMBULARY SURGERY CENTER | Age: 68
Setting detail: OUTPATIENT SURGERY
End: 2023-05-26
Attending: COLON & RECTAL SURGERY

## 2023-05-26 ENCOUNTER — ANESTHESIA (OUTPATIENT)
Dept: GASTROENTEROLOGY | Facility: AMBULARY SURGERY CENTER | Age: 68
End: 2023-05-26

## 2023-05-26 VITALS
HEART RATE: 59 BPM | HEIGHT: 67 IN | SYSTOLIC BLOOD PRESSURE: 124 MMHG | DIASTOLIC BLOOD PRESSURE: 69 MMHG | WEIGHT: 193 LBS | BODY MASS INDEX: 30.29 KG/M2 | OXYGEN SATURATION: 95 % | RESPIRATION RATE: 18 BRPM | TEMPERATURE: 98.3 F

## 2023-05-26 DIAGNOSIS — Z86.010 PERSONAL HISTORY OF COLONIC POLYPS: ICD-10-CM

## 2023-05-26 RX ORDER — LIDOCAINE HYDROCHLORIDE 10 MG/ML
INJECTION, SOLUTION EPIDURAL; INFILTRATION; INTRACAUDAL; PERINEURAL AS NEEDED
Status: DISCONTINUED | OUTPATIENT
Start: 2023-05-26 | End: 2023-05-26

## 2023-05-26 RX ORDER — PROPOFOL 10 MG/ML
INJECTION, EMULSION INTRAVENOUS AS NEEDED
Status: DISCONTINUED | OUTPATIENT
Start: 2023-05-26 | End: 2023-05-26

## 2023-05-26 RX ORDER — SODIUM CHLORIDE, SODIUM LACTATE, POTASSIUM CHLORIDE, CALCIUM CHLORIDE 600; 310; 30; 20 MG/100ML; MG/100ML; MG/100ML; MG/100ML
INJECTION, SOLUTION INTRAVENOUS CONTINUOUS PRN
Status: DISCONTINUED | OUTPATIENT
Start: 2023-05-26 | End: 2023-05-26

## 2023-05-26 RX ADMIN — PROPOFOL 50 MG: 10 INJECTION, EMULSION INTRAVENOUS at 10:28

## 2023-05-26 RX ADMIN — Medication 40 MG: at 10:46

## 2023-05-26 RX ADMIN — PROPOFOL 50 MG: 10 INJECTION, EMULSION INTRAVENOUS at 10:34

## 2023-05-26 RX ADMIN — PROPOFOL 50 MG: 10 INJECTION, EMULSION INTRAVENOUS at 10:31

## 2023-05-26 RX ADMIN — PROPOFOL 100 MG: 10 INJECTION, EMULSION INTRAVENOUS at 10:25

## 2023-05-26 RX ADMIN — SODIUM CHLORIDE, SODIUM LACTATE, POTASSIUM CHLORIDE, AND CALCIUM CHLORIDE: .6; .31; .03; .02 INJECTION, SOLUTION INTRAVENOUS at 10:20

## 2023-05-26 RX ADMIN — PROPOFOL 50 MG: 10 INJECTION, EMULSION INTRAVENOUS at 10:40

## 2023-05-26 RX ADMIN — PROPOFOL 50 MG: 10 INJECTION, EMULSION INTRAVENOUS at 10:37

## 2023-05-26 RX ADMIN — LIDOCAINE HYDROCHLORIDE 50 MG: 10 INJECTION, SOLUTION EPIDURAL; INFILTRATION; INTRACAUDAL; PERINEURAL at 10:25

## 2023-05-26 RX ADMIN — PROPOFOL 50 MG: 10 INJECTION, EMULSION INTRAVENOUS at 10:43

## 2023-05-26 RX ADMIN — PROPOFOL 50 MG: 10 INJECTION, EMULSION INTRAVENOUS at 10:46

## 2023-05-26 NOTE — ANESTHESIA PREPROCEDURE EVALUATION
"Procedure:  COLONOSCOPY    ECHO (08/31/22): Interpretation Summary  •  Left Ventricle: Left ventricular cavity size is normal  Wall thickness is mildly increased  The left ventricular ejection fraction is 60-65%  Systolic function is normal  Wall motion is normal   •  Aortic Valve: The aortic valve is trileaflet  The leaflets are mildly calcified  The leaflets exhibit normal mobility  There is trace to mild regurgitation  The aortic valve has no significant stenosis        Relevant Problems   ANESTHESIA (within normal limits)      CARDIO   (+) Essential hypertension      ENDO (within normal limits)      GI/HEPATIC (within normal limits)      /RENAL (within normal limits)      GYN (within normal limits)      HEMATOLOGY   (+) Beta+ thalassemia, normal Hb A2, type 1, silent (HCC)      MUSCULOSKELETAL (within normal limits)      NEURO/PSYCH (within normal limits)      PULMONARY (within normal limits)      Nervous and Auditory   (+) Cervical radiculopathy      Lab Results   Component Value Date    HCT 34 6 (L) 02/02/2023    HGB 10 8 (L) 02/02/2023    MCV 63 (L) 02/02/2023     02/02/2023    WBC 4 82 02/02/2023     Lab Results   Component Value Date    AGAP 17 (H) 04/10/2023    ALKPHOS 54 02/02/2023    ALT 28 02/02/2023    ANIONGAP 2 (L) 01/30/2015    AST 17 02/02/2023    BILITOT 0 7 01/30/2015    BUN 20 04/10/2023    CALCIUM 9 4 04/10/2023    CL 98 04/10/2023    CO2 29 04/10/2023    CREATININE 0 94 04/10/2023    EGFR 81 04/10/2023    GLUC 84 04/10/2023    GLUF 91 02/02/2023    K 3 2 (L) 04/10/2023     01/30/2015    PROT 7 1 01/30/2015    SODIUM 144 04/10/2023    TBILI 1 13 (H) 02/02/2023    TP 7 1 02/02/2023     No results found for: \"PTT\"  No results found for: \"INR\", \"PROTIME\"    Physical Exam    Airway    Mallampati score: II  TM Distance: >3 FB  Neck ROM: full     Dental   No notable dental hx     Cardiovascular  Rhythm: regular, Rate: normal, Cardiovascular exam normal    Pulmonary  Pulmonary exam " normal Breath sounds clear to auscultation,     Other Findings        Anesthesia Plan  ASA Score- 2     Anesthesia Type- IV sedation with anesthesia with ASA Monitors  Additional Monitors:   Airway Plan:           Plan Factors-Exercise tolerance (METS): >4 METS  Chart reviewed  EKG reviewed  Imaging results reviewed  Existing labs reviewed  Patient summary reviewed  Patient is not a current smoker  Patient did not smoke on day of surgery  Obstructive sleep apnea risk education given perioperatively  Induction- intravenous  Postoperative Plan-     Informed Consent- Anesthetic plan and risks discussed with patient  I personally reviewed this patient with the CRNA  Discussed and agreed on the Anesthesia Plan with the CRNA  Natalia Lai

## 2023-05-26 NOTE — H&P
History and Physical   Colon and Rectal Surgery   Sheri Postal  77 y o  male MRN: [id number]  Unit/Bed#:  Encounter: [id number]  05/26/23   @NOW    No chief complaint on file          History of Present Illness   HPI:  Sheri Postal  is a 77 y o  male who presents for surveillance colonoscopy for personal history of polyps      Historical Information   Past Medical History:   Diagnosis Date   • Allergic 1998   • Anemia 1975   • Cervicalgia 10/29/2014   • Colon polyp    • Hypertension 2006   • Prostatic hypertrophy     Benign     Past Surgical History:   Procedure Laterality Date   • COLONOSCOPY     • SKIN TAG REMOVAL     • VASECTOMY      Vas Deferens       Meds/Allergies     (Not in a hospital admission)        Current Outpatient Medications:   •  amLODIPine (NORVASC) 10 mg tablet, TAKE 1 TABLET BY MOUTH EVERY DAY, Disp: 90 tablet, Rfl: 1  •  Ascorbic Acid (VITAMIN C) 1000 MG tablet, Take 2 tablets by mouth daily, Disp: , Rfl:   •  B Complex Vitamins (VITAMIN B-COMPLEX 100 IJ), Take by mouth, Disp: , Rfl:   •  BETA CAROTENE PO, Take by mouth, Disp: , Rfl:   •  CHROMIUM PO, Take by mouth, Disp: , Rfl:   •  coenzyme Q-10 100 MG capsule, Take by mouth, Disp: , Rfl:   •  Klor-Con M10 10 MEQ tablet, TAKE 1 TABLET BY MOUTH EVERY DAY, Disp: 90 tablet, Rfl: 1  •  Lactobacillus (ACIDOPHILUS PO), Take by mouth, Disp: , Rfl:   •  lisinopril-hydrochlorothiazide (PRINZIDE,ZESTORETIC) 20-12 5 MG per tablet, TAKE 2 TABLETS BY MOUTH EVERY DAY, Disp: 180 tablet, Rfl: 1  •  Multiple Vitamin-Folic Acid TABS, Take by mouth, Disp: , Rfl:   •  Omega-3 Fatty Acids (FISH OIL) 1,000 mg, Take 2 tablets by mouth daily, Disp: , Rfl:   •  rosuvastatin (CRESTOR) 5 mg tablet, TAKE 1 TABLET BY MOUTH EVERY DAY, Disp: 90 tablet, Rfl: 1  •  vitamin E, tocopherol, 400 units capsule, Take 400 Units by mouth 2 (two) times a day, Disp: , Rfl:     Allergies   Allergen Reactions   • Albuterol Throat Swelling         Social History   Social History     Substance and Sexual Activity   Alcohol Use Yes   • Alcohol/week: 5 0 standard drinks of alcohol   • Types: 3 Glasses of wine, 2 Cans of beer per week    Comment: drinks wine     Social History     Substance and Sexual Activity   Drug Use No     Social History     Tobacco Use   Smoking Status Never   Smokeless Tobacco Never         Family History:   Family History   Problem Relation Age of Onset   • Thalassemia Mother    • Heart Valve Disease Mother         heart valve replaced   • Hypertension Father    • Stomach cancer Father    • Cancer Father         Stomach cancer   • Colon cancer Brother         mass in colon, suspected cancer-dx'd at 47 y/o   • Valvular heart disease Brother         bicuspid aortic valve   • Alcohol abuse Neg Hx    • Substance Abuse Neg Hx    • Mental illness Neg Hx    • Depression Neg Hx          Objective     Current Vitals:      No intake or output data in the 24 hours ending 05/26/23 9601    Physical Exam:  General:  Resting comfortably in bed   Eyes:Sclera anicteric  ENT: Trachea midline  Pulm:  Symmetric chest raise  No respiratory Distress  CV:  Regular on monitor  Abdomen:  Soft NT ND  Extremities:  No clubbing/ cyanosis/ edema    Lab Results: I have personally reviewed pertinent lab results  Imaging: I have personally reviewed pertinent reports  ASSESSMENT:  Nadia Gerardo  is a 77 y o  male who presents for outpatient colonoscopy  PLAN:  For colonoscopy    Risks/ Benefits reviewed to include but not limited to anesthesia, bleeding, missed lesions, and colonoscopic perforation requiring surgery

## 2023-05-26 NOTE — ANESTHESIA POSTPROCEDURE EVALUATION
Post-Op Assessment Note    CV Status:  Stable  Pain Score: 0    Pain management: adequate     Mental Status:  Sleepy   Hydration Status:  Stable   PONV Controlled:  None   Airway Patency:  Patent      Post Op Vitals Reviewed: Yes      Staff: CRNA         No notable events documented      BP 98/61 (05/26/23 1052)    Temp 98 3 °F (36 8 °C) (05/26/23 1052)    Pulse     Resp 18 (05/26/23 1052)    SpO2 93 % (05/26/23 1052)

## 2023-07-21 DIAGNOSIS — I10 ESSENTIAL HYPERTENSION: ICD-10-CM

## 2023-07-21 DIAGNOSIS — E87.6 LOW SERUM POTASSIUM LEVEL: ICD-10-CM

## 2023-07-21 RX ORDER — LISINOPRIL AND HYDROCHLOROTHIAZIDE 20; 12.5 MG/1; MG/1
TABLET ORAL
Qty: 180 TABLET | Refills: 1 | Status: SHIPPED | OUTPATIENT
Start: 2023-07-21

## 2023-07-21 RX ORDER — AMLODIPINE BESYLATE 10 MG/1
TABLET ORAL
Qty: 90 TABLET | Refills: 1 | Status: SHIPPED | OUTPATIENT
Start: 2023-07-21

## 2023-07-21 RX ORDER — POTASSIUM CHLORIDE 750 MG/1
TABLET, EXTENDED RELEASE ORAL
Qty: 90 TABLET | Refills: 1 | Status: SHIPPED | OUTPATIENT
Start: 2023-07-21

## 2023-08-21 ENCOUNTER — APPOINTMENT (OUTPATIENT)
Dept: LAB | Facility: CLINIC | Age: 68
End: 2023-08-21
Payer: MEDICARE

## 2023-08-21 DIAGNOSIS — I10 ESSENTIAL HYPERTENSION: ICD-10-CM

## 2023-08-21 DIAGNOSIS — E55.9 VITAMIN D DEFICIENCY: ICD-10-CM

## 2023-08-21 DIAGNOSIS — E78.00 ELEVATED LDL CHOLESTEROL LEVEL: ICD-10-CM

## 2023-08-21 DIAGNOSIS — D56.8: ICD-10-CM

## 2023-08-21 LAB
25(OH)D3 SERPL-MCNC: 73.6 NG/ML (ref 30–100)
ALBUMIN SERPL BCP-MCNC: 4.5 G/DL (ref 3.5–5)
ALP SERPL-CCNC: 54 U/L (ref 34–104)
ALT SERPL W P-5'-P-CCNC: 20 U/L (ref 7–52)
ANION GAP SERPL CALCULATED.3IONS-SCNC: 6 MMOL/L
AST SERPL W P-5'-P-CCNC: 14 U/L (ref 13–39)
BASOPHILS # BLD AUTO: 0.04 THOUSANDS/ÂΜL (ref 0–0.1)
BASOPHILS NFR BLD AUTO: 1 % (ref 0–1)
BILIRUB SERPL-MCNC: 1.25 MG/DL (ref 0.2–1)
BUN SERPL-MCNC: 17 MG/DL (ref 5–25)
CALCIUM SERPL-MCNC: 9 MG/DL (ref 8.4–10.2)
CHLORIDE SERPL-SCNC: 105 MMOL/L (ref 96–108)
CHOLEST SERPL-MCNC: 99 MG/DL
CO2 SERPL-SCNC: 30 MMOL/L (ref 21–32)
CREAT SERPL-MCNC: 0.84 MG/DL (ref 0.6–1.3)
EOSINOPHIL # BLD AUTO: 0.34 THOUSAND/ÂΜL (ref 0–0.61)
EOSINOPHIL NFR BLD AUTO: 7 % (ref 0–6)
ERYTHROCYTE [DISTWIDTH] IN BLOOD BY AUTOMATED COUNT: 18 % (ref 11.6–15.1)
GFR SERPL CREATININE-BSD FRML MDRD: 88 ML/MIN/1.73SQ M
GLUCOSE P FAST SERPL-MCNC: 90 MG/DL (ref 65–99)
HCT VFR BLD AUTO: 36.7 % (ref 36.5–49.3)
HDLC SERPL-MCNC: 39 MG/DL
HGB BLD-MCNC: 11.3 G/DL (ref 12–17)
IMM GRANULOCYTES # BLD AUTO: 0.01 THOUSAND/UL (ref 0–0.2)
IMM GRANULOCYTES NFR BLD AUTO: 0 % (ref 0–2)
LDLC SERPL CALC-MCNC: 41 MG/DL (ref 0–100)
LYMPHOCYTES # BLD AUTO: 0.76 THOUSANDS/ÂΜL (ref 0.6–4.47)
LYMPHOCYTES NFR BLD AUTO: 15 % (ref 14–44)
MCH RBC QN AUTO: 19.7 PG (ref 26.8–34.3)
MCHC RBC AUTO-ENTMCNC: 30.8 G/DL (ref 31.4–37.4)
MCV RBC AUTO: 64 FL (ref 82–98)
MONOCYTES # BLD AUTO: 0.43 THOUSAND/ÂΜL (ref 0.17–1.22)
MONOCYTES NFR BLD AUTO: 8 % (ref 4–12)
NEUTROPHILS # BLD AUTO: 3.54 THOUSANDS/ÂΜL (ref 1.85–7.62)
NEUTS SEG NFR BLD AUTO: 69 % (ref 43–75)
NRBC BLD AUTO-RTO: 0 /100 WBCS
PLATELET # BLD AUTO: 284 THOUSANDS/UL (ref 149–390)
PMV BLD AUTO: 10.8 FL (ref 8.9–12.7)
POTASSIUM SERPL-SCNC: 3.3 MMOL/L (ref 3.5–5.3)
PROT SERPL-MCNC: 7.2 G/DL (ref 6.4–8.4)
RBC # BLD AUTO: 5.75 MILLION/UL (ref 3.88–5.62)
SODIUM SERPL-SCNC: 141 MMOL/L (ref 135–147)
TRIGL SERPL-MCNC: 94 MG/DL
WBC # BLD AUTO: 5.12 THOUSAND/UL (ref 4.31–10.16)

## 2023-08-21 PROCEDURE — 85025 COMPLETE CBC W/AUTO DIFF WBC: CPT

## 2023-08-21 PROCEDURE — 36415 COLL VENOUS BLD VENIPUNCTURE: CPT

## 2023-08-21 PROCEDURE — 82306 VITAMIN D 25 HYDROXY: CPT

## 2023-08-21 PROCEDURE — 80061 LIPID PANEL: CPT

## 2023-08-21 PROCEDURE — 80053 COMPREHEN METABOLIC PANEL: CPT

## 2023-08-24 ENCOUNTER — RA CDI HCC (OUTPATIENT)
Dept: OTHER | Facility: HOSPITAL | Age: 68
End: 2023-08-24

## 2023-09-02 DIAGNOSIS — Z91.89 FRAMINGHAM CARDIAC RISK 10-20% IN NEXT 10 YEARS: ICD-10-CM

## 2023-09-02 DIAGNOSIS — E78.00 ELEVATED LDL CHOLESTEROL LEVEL: ICD-10-CM

## 2023-09-04 RX ORDER — ROSUVASTATIN CALCIUM 5 MG/1
TABLET, COATED ORAL
Qty: 90 TABLET | Refills: 1 | Status: SHIPPED | OUTPATIENT
Start: 2023-09-04

## 2023-09-05 ENCOUNTER — OFFICE VISIT (OUTPATIENT)
Dept: INTERNAL MEDICINE CLINIC | Facility: CLINIC | Age: 68
End: 2023-09-05
Payer: MEDICARE

## 2023-09-05 ENCOUNTER — HOSPITAL ENCOUNTER (OUTPATIENT)
Dept: NON INVASIVE DIAGNOSTICS | Facility: HOSPITAL | Age: 68
Discharge: HOME | End: 2023-09-05
Payer: MEDICARE

## 2023-09-05 VITALS
TEMPERATURE: 98.2 F | HEART RATE: 69 BPM | OXYGEN SATURATION: 98 % | DIASTOLIC BLOOD PRESSURE: 84 MMHG | WEIGHT: 199.6 LBS | BODY MASS INDEX: 31.26 KG/M2 | SYSTOLIC BLOOD PRESSURE: 126 MMHG

## 2023-09-05 DIAGNOSIS — R17 ELEVATED BILIRUBIN: ICD-10-CM

## 2023-09-05 DIAGNOSIS — Z12.83 SKIN EXAM, SCREENING FOR CANCER: ICD-10-CM

## 2023-09-05 DIAGNOSIS — E78.00 ELEVATED LDL CHOLESTEROL LEVEL: ICD-10-CM

## 2023-09-05 DIAGNOSIS — R60.0 PEDAL EDEMA: ICD-10-CM

## 2023-09-05 DIAGNOSIS — L81.9 ATYPICAL PIGMENTED SKIN LESION: ICD-10-CM

## 2023-09-05 DIAGNOSIS — I10 ESSENTIAL HYPERTENSION: ICD-10-CM

## 2023-09-05 DIAGNOSIS — Z15.09 MONOALLELIC MUTATION OF ATM GENE: ICD-10-CM

## 2023-09-05 DIAGNOSIS — E87.6 LOW SERUM POTASSIUM LEVEL: ICD-10-CM

## 2023-09-05 DIAGNOSIS — Z15.01 MONOALLELIC MUTATION OF ATM GENE: ICD-10-CM

## 2023-09-05 DIAGNOSIS — Z91.89 FRAMINGHAM CARDIAC RISK 10-20% IN NEXT 10 YEARS: ICD-10-CM

## 2023-09-05 DIAGNOSIS — Z00.00 MEDICARE ANNUAL WELLNESS VISIT, SUBSEQUENT: ICD-10-CM

## 2023-09-05 DIAGNOSIS — Z15.89 MONOALLELIC MUTATION OF ATM GENE: ICD-10-CM

## 2023-09-05 DIAGNOSIS — D56.8: Primary | ICD-10-CM

## 2023-09-05 PROBLEM — Z13.79 GENETIC TESTING: Status: RESOLVED | Noted: 2019-01-18 | Resolved: 2023-09-05

## 2023-09-05 PROCEDURE — 99214 OFFICE O/P EST MOD 30 MIN: CPT | Performed by: INTERNAL MEDICINE

## 2023-09-05 PROCEDURE — 93970 EXTREMITY STUDY: CPT

## 2023-09-05 PROCEDURE — G0439 PPPS, SUBSEQ VISIT: HCPCS | Performed by: INTERNAL MEDICINE

## 2023-09-05 PROCEDURE — 93970 EXTREMITY STUDY: CPT | Performed by: SURGERY

## 2023-09-05 RX ORDER — LOSARTAN POTASSIUM AND HYDROCHLOROTHIAZIDE 25; 100 MG/1; MG/1
1 TABLET ORAL DAILY
Qty: 30 TABLET | Refills: 5 | Status: SHIPPED | OUTPATIENT
Start: 2023-09-05

## 2023-09-05 RX ORDER — POTASSIUM CHLORIDE 20 MEQ/1
20 TABLET, EXTENDED RELEASE ORAL DAILY
Qty: 90 TABLET | Refills: 1 | Status: SHIPPED | OUTPATIENT
Start: 2023-09-05

## 2023-09-05 NOTE — PROGRESS NOTES
Name: Marcelo Green. : 1955      MRN: [id number]  Encounter Provider: Osvaldo Mayers DO  Encounter Date: 2023   Encounter department: [address] [place] [address]     1. Beta+ thalassemia, normal Hb A2, type 1, silent (HCC)  Comments:  stable s/p heme eval. C/w monitoring CBC every 6 mos  Orders:  -     CBC and differential; Future; Expected date: 2024    2. Essential hypertension  Comments:  BP doing well but he is having a dry cough. will switch ACEI to ARB and c/w HCTZ  Orders:  -     Comprehensive metabolic panel; Future; Expected date: 2024  -     losartan-hydrochlorothiazide (HYZAAR) 100-25 MG per tablet; Take 1 tablet by mouth daily  -     TSH, 3rd generation with Free T4 reflex    3. Elevated bilirubin  Comments:  noted on BW, likely related to thalessemia.  given new onset pedal edema, check liver US  Orders:  -     US right upper quadrant; Future; Expected date: 2023    4. Harper cardiac risk 10-20% in next 10 years  -     Lipid Panel with Direct LDL reflex; Future; Expected date: 2024    5. Elevated LDL cholesterol level  Comments:  taking statin and no SE, c/w rx  Orders:  -     Lipid Panel with Direct LDL reflex; Future; Expected date: 2024    6. Low serum potassium level  Comments:  Increase potassium dose to 20 meq per day  Orders:  -     potassium chloride (K-DUR,KLOR-CON) 20 mEq tablet; Take 1 tablet (20 mEq total) by mouth daily    7. Pedal edema  Comments:  recent onset, etiology of this is unclear. check venous duplex to r/o DVT. check BNP and Liver US  Orders:  -     B-Type Natriuretic Peptide(BNP); Future  -     VAS lower limb venous duplex study, complete bilateral; Future; Expected date: 2023  -     TSH, 3rd generation with Free T4 reflex    8. Monoallelic mutation of OSMANY gene  -     Ambulatory Referral to Dermatology; Future    9.  Atypical pigmented skin lesion  -     Ambulatory Referral to Dermatology; Future    10. Skin exam, screening for cancer  -     Ambulatory Referral to Dermatology; Future    11. Medicare annual wellness visit, subsequent        Depression Screening and Follow-up Plan: Patient was screened for depression during today's encounter. They screened negative with a PHQ-2 score of 0. Subjective      HPI     Here for follow up, Meir Morrow is overall doing well. He is taking his medications and completed BW Prior to today's appt. He has no questions or concerns today. He walked 10k-15K steps per day during [employer].  He is UTD On colonoscopy. He has some skin lesion on his back that are pigmented. He has a hx of monoallelic for OSMANY gene. He is compliant with potassium supplement daily. He has a nagging dry cough over recent weeks, no congestion, wheezing, SOB, runny nose. He wonders if this is from his ACEI medication. On exam he has 1+ pedal edema b/l and has noticed this over the last 1 week. He has no SOB, orthopnea, dizziness, CP or other associated symptoms. ROS otherwise negative, no other complaints. Review of Systems   Constitutional: Negative for fever. HENT: Negative for congestion, postnasal drip and rhinorrhea. Eyes: Negative for visual disturbance. Respiratory: Positive for cough. Negative for shortness of breath. Cardiovascular: Positive for leg swelling. Negative for chest pain. Gastrointestinal: Negative for abdominal pain. Endocrine: Negative for polyuria. Genitourinary: Negative for difficulty urinating. Musculoskeletal: Negative for gait problem. Skin: Positive for rash. Allergic/Immunologic: Negative for immunocompromised state. Neurological: Negative for dizziness. Psychiatric/Behavioral: Negative for dysphoric mood.        Current Outpatient Medications on File Prior to Visit   Medication Sig   • amLODIPine (NORVASC) 10 mg tablet TAKE 1 TABLET BY MOUTH EVERY DAY   • Ascorbic Acid (VITAMIN C) 1000 MG tablet Take 2 tablets by mouth daily   • B Complex Vitamins (VITAMIN B-COMPLEX 100 IJ) Take by mouth   • BETA CAROTENE PO Take by mouth   • CHROMIUM PO Take by mouth   • coenzyme Q-10 100 MG capsule Take by mouth   • Lactobacillus (ACIDOPHILUS PO) Take by mouth   • Multiple Vitamin-Folic Acid TABS Take by mouth   • Omega-3 Fatty Acids (FISH OIL) 1,000 mg Take 2 tablets by mouth daily   • rosuvastatin (CRESTOR) 5 mg tablet TAKE 1 TABLET BY MOUTH EVERY DAY   • vitamin E, tocopherol, 400 units capsule Take 400 Units by mouth 2 (two) times a day   • [DISCONTINUED] Klor-Con M10 10 MEQ tablet TAKE 1 TABLET BY MOUTH EVERY DAY   • [DISCONTINUED] lisinopril-hydrochlorothiazide (PRINZIDE,ZESTORETIC) 20-12.5 MG per tablet TAKE 2 TABLETS BY MOUTH EVERY DAY       Objective     /84 (BP Location: Left arm, Patient Position: Sitting, Cuff Size: Standard)   Pulse 69   Temp 98.2 °F (36.8 °C)   Wt 90.5 kg (199 lb 9.6 oz)   SpO2 98%   BMI 31.26 kg/m²     Physical Exam  Vitals reviewed. Constitutional:       General: He is not in acute distress. HENT:      Head: Normocephalic and atraumatic. Right Ear: Tympanic membrane normal.      Left Ear: Tympanic membrane normal.   Eyes:      Conjunctiva/sclera: Conjunctivae normal.   Cardiovascular:      Rate and Rhythm: Normal rate and regular rhythm. Heart sounds: No murmur heard. Pulmonary:      Effort: Pulmonary effort is normal.      Breath sounds: No wheezing or rales. Abdominal:      General: Bowel sounds are normal.      Palpations: Abdomen is soft. Tenderness: There is no abdominal tenderness. Musculoskeletal:      Cervical back: Neck supple. Right lower le+ Pitting Edema present. Left lower le+ Pitting Edema present. Skin:     Findings: Lesion (multiple raised pigmented and non-pigmented skin lesions on back b/l) present. Neurological:      Mental Status: He is alert. Mental status is at baseline.    Psychiatric:         Mood and Affect: Mood normal. Behavior: Behavior normal.          Results for orders placed or performed in visit on 08/21/23   Comprehensive metabolic panel   Result Value Ref Range    Sodium 141 135 - 147 mmol/L    Potassium 3.3 (L) 3.5 - 5.3 mmol/L    Chloride 105 96 - 108 mmol/L    CO2 30 21 - 32 mmol/L    ANION GAP 6 mmol/L    BUN 17 5 - 25 mg/dL    Creatinine 0.84 0.60 - 1.30 mg/dL    Glucose, Fasting 90 65 - 99 mg/dL    Calcium 9.0 8.4 - 10.2 mg/dL    AST 14 13 - 39 U/L    ALT 20 7 - 52 U/L    Alkaline Phosphatase 54 34 - 104 U/L    Total Protein 7.2 6.4 - 8.4 g/dL    Albumin 4.5 3.5 - 5.0 g/dL    Total Bilirubin 1.25 (H) 0.20 - 1.00 mg/dL    eGFR 88 ml/min/1.73sq m   Lipid Panel with Direct LDL reflex   Result Value Ref Range    Cholesterol 99 See Comment mg/dL    Triglycerides 94 See Comment mg/dL    HDL, Direct 39 (L) >=40 mg/dL    LDL Calculated 41 0 - 100 mg/dL   CBC and differential   Result Value Ref Range    WBC 5.12 4.31 - 10.16 Thousand/uL    RBC 5.75 (H) 3.88 - 5.62 Million/uL    Hemoglobin 11.3 (L) 12.0 - 17.0 g/dL    Hematocrit 36.7 36.5 - 49.3 %    MCV 64 (L) 82 - 98 fL    MCH 19.7 (L) 26.8 - 34.3 pg    MCHC 30.8 (L) 31.4 - 37.4 g/dL    RDW 18.0 (H) 11.6 - 15.1 %    MPV 10.8 8.9 - 12.7 fL    Platelets 742 696 - 339 Thousands/uL    nRBC 0 /100 WBCs    Neutrophils Relative 69 43 - 75 %    Immat GRANS % 0 0 - 2 %    Lymphocytes Relative 15 14 - 44 %    Monocytes Relative 8 4 - 12 %    Eosinophils Relative 7 (H) 0 - 6 %    Basophils Relative 1 0 - 1 %    Neutrophils Absolute 3.54 1.85 - 7.62 Thousands/µL    Immature Grans Absolute 0.01 0.00 - 0.20 Thousand/uL    Lymphocytes Absolute 0.76 0.60 - 4.47 Thousands/µL    Monocytes Absolute 0.43 0.17 - 1.22 Thousand/µL    Eosinophils Absolute 0.34 0.00 - 0.61 Thousand/µL    Basophils Absolute 0.04 0.00 - 0.10 Thousands/µL   Vitamin D 25 hydroxy   Result Value Ref Range    Vit D, 25-Hydroxy 73.6 30.0 - 100.0 ng/mL         Dony Quick DO

## 2023-09-05 NOTE — PROGRESS NOTES
Masha Shine is here for his Subsequent Wellness visit. Health Risk Assessment:   Patient rates overall health as very good. Patient feels that their physical health rating is same. Patient is very satisfied with their life. Eyesight was rated as same. Hearing was rated as same. Patient feels that their emotional and mental health rating is same. Patients states they are never, rarely angry. Patient states they are never, rarely unusually tired/fatigued. Pain experienced in the last 7 days has been none. Patient states that he has experienced no weight loss or gain in last 6 months. Depression Screening:   PHQ-2 Score: 0      Fall Risk Screening: In the past year, patient has experienced: no history of falling in past year      Home Safety:  Patient does not have trouble with stairs inside or outside of their home. Patient has working smoke alarms and has no working carbon monoxide detector. Home safety hazards include: none. Nutrition:   Current diet is Regular. Medications:   Patient is currently taking over-the-counter supplements. OTC medications include: Same list as last year. Patient is able to manage medications. Activities of Daily Living (ADLs)/Instrumental Activities of Daily Living (IADLs):   Walk and transfer into and out of bed and chair?: Yes  Dress and groom yourself?: Yes    Bathe or shower yourself?: Yes    Feed yourself?  Yes  Do your laundry/housekeeping?: Yes  Manage your money, pay your bills and track your expenses?: Yes  Make your own meals?: Yes    Do your own shopping?: Yes    Previous Hospitalizations:   Any hospitalizations or ED visits within the last 12 months?: No      Advance Care Planning:   Living will: No    Durable POA for healthcare: No    Advanced directive: No      PREVENTIVE SCREENINGS      Cardiovascular Screening:    General: Screening Current      Diabetes Screening:     General: Screening Current      Colorectal Cancer Screening:     General: Screening Current      Prostate Cancer Screening:    General: Screening Current      Osteoporosis Screening:    General: Screening Not Indicated      Abdominal Aortic Aneurysm (AAA) Screening:    Risk factors include: age between 70-77 yo        General: Screening Not Indicated      Lung Cancer Screening:     General: Screening Not Indicated      Hepatitis C Screening:    General: Screening Current    Screening, Brief Intervention, and Referral to Treatment (SBIRT)    Screening  Typical number of drinks in a day: 1  Typical number of drinks in a week: 5  Interpretation: Low risk drinking behavior. AUDIT-C Screenin) How often did you have a drink containing alcohol in the past year? 4 or more times a week  2) How many drinks did you have on a typical day when you were drinking in the past year? 1 to 2  3) How often did you have 6 or more drinks on one occasion in the past year? never    AUDIT-C Score: 4  Interpretation: Score 4-12 (male): POSITIVE screen for alcohol misuse    AUDIT Screenin) How often during the last year have you found that you were not able to stop drinking once you had started? 0 - never  5) How often during the last year have you failed to do what was normally expected from you because of drinking? 0 - never  6) How often during the last year have you needed a first drink in the morning to get yourself going after a heavy drinking session?  0 - never  7) How often during the last year have you had a feeling of guilt or remorse after drinking? 0 - never  8) How often during the last year have you been unable to remember what happened the night before because you had been drinking? 0 - never  9) Have you or someone else been injured as a result of your drinking? 0 - no  10) Has a relative or friend or a doctor or another health worker been concerned about your drinking or suggested you cut down? 0 - no    AUDIT Score: 4  Interpretation: Low risk alcohol consumption    Single Item Drug Screening:  How often have you used an illegal drug (including marijuana) or a prescription medication for non-medical reasons in the past year? never    Single Item Drug Screen Score: 0  Interpretation: Negative screen for possible drug use disorder    Brief Intervention  Healthy alcohol use/limits discussed. Other Counseling Topics:   Regular weightbearing exercise.

## 2023-09-05 NOTE — PATIENT INSTRUCTIONS
Blood work  Ultrasound of legs  Ultrasound of liver  Monitor blood pressure at home (goal under 140/90)

## 2023-09-18 ENCOUNTER — APPOINTMENT (OUTPATIENT)
Dept: LAB | Facility: CLINIC | Age: 68
End: 2023-09-18
Payer: MEDICARE

## 2023-09-18 DIAGNOSIS — R60.0 PEDAL EDEMA: ICD-10-CM

## 2023-09-18 LAB
BNP SERPL-MCNC: 19 PG/ML (ref 0–100)
TSH SERPL DL<=0.05 MIU/L-ACNC: 3.73 UIU/ML (ref 0.45–4.5)

## 2023-09-18 PROCEDURE — 83880 ASSAY OF NATRIURETIC PEPTIDE: CPT

## 2023-09-19 ENCOUNTER — TELEPHONE (OUTPATIENT)
Dept: INTERNAL MEDICINE CLINIC | Facility: CLINIC | Age: 68
End: 2023-09-19

## 2023-09-19 NOTE — TELEPHONE ENCOUNTER
----- Message from Marlen Ojeda DO sent at 9/19/2023  1:00 PM EDT -----  BW from yesterday is back and is normal.  I see that Dionicio Coffey scheduled his liver US. How is his leg swelling, is it the same/better/worse?

## 2023-09-19 NOTE — TELEPHONE ENCOUNTER
Sounds like his BP is running a bit high with losartan-HCTZ 100mg tablet    If his readings are all a bit high at home, recommend to stop losartan and try olmesartan-HCTZ 40mg once a day instead    Monitor BP @ home after making the change    Let me know if questions, otherwise I will send the order to [facility]

## 2023-09-21 ENCOUNTER — HOSPITAL ENCOUNTER (OUTPATIENT)
Dept: ULTRASOUND IMAGING | Facility: HOSPITAL | Age: 68
Discharge: HOME | End: 2023-09-21
Attending: INTERNAL MEDICINE
Payer: MEDICARE

## 2023-09-21 DIAGNOSIS — R17 ELEVATED BILIRUBIN: ICD-10-CM

## 2023-09-21 PROCEDURE — 76705 ECHO EXAM OF ABDOMEN: CPT

## 2023-09-22 ENCOUNTER — TELEPHONE (OUTPATIENT)
Dept: INTERNAL MEDICINE CLINIC | Facility: CLINIC | Age: 68
End: 2023-09-22

## 2023-09-22 DIAGNOSIS — I10 ESSENTIAL HYPERTENSION: Primary | ICD-10-CM

## 2023-09-22 RX ORDER — OLMESARTAN MEDOXOMIL AND HYDROCHLOROTHIAZIDE 40/25 40; 25 MG/1; MG/1
1 TABLET ORAL DAILY
Qty: 30 TABLET | Refills: 0 | Status: SHIPPED | OUTPATIENT
Start: 2023-09-22

## 2023-09-22 NOTE — TELEPHONE ENCOUNTER
Pt called back wanting to know if you sent the new medication over to [facility] please advise I do not see it sent over

## 2023-09-27 ENCOUNTER — TELEPHONE (OUTPATIENT)
Dept: INTERNAL MEDICINE CLINIC | Facility: CLINIC | Age: 68
End: 2023-09-27

## 2023-09-27 DIAGNOSIS — K76.0 FATTY LIVER: ICD-10-CM

## 2023-09-27 DIAGNOSIS — R17 ELEVATED BILIRUBIN: Primary | ICD-10-CM

## 2023-09-27 NOTE — TELEPHONE ENCOUNTER
----- Message from Joshua Armstrong DO sent at 9/27/2023  2:07 PM EDT -----  Liver ultrasound results are back, show moderate to severe fatty liver. This normally occurs from an unhealthy/high cholesterol diet and in people that are overweight and don't exercise. Fat in liver can inflame the liver. Recommend Joby see a liver specialist, Dr Tamy Barraza or Janae Cason at [facility].   If he agrees, pls give him phone # to call and let me know, thanks

## 2023-09-28 ENCOUNTER — TELEPHONE (OUTPATIENT)
Dept: GASTROENTEROLOGY | Facility: CLINIC | Age: 68
End: 2023-09-28

## 2023-09-29 ENCOUNTER — TELEPHONE (OUTPATIENT)
Age: 68
End: 2023-09-29

## 2023-09-29 ENCOUNTER — APPOINTMENT (OUTPATIENT)
Dept: LAB | Facility: CLINIC | Age: 68
End: 2023-09-29
Payer: MEDICARE

## 2023-09-29 DIAGNOSIS — R97.20 ELEVATED PSA: ICD-10-CM

## 2023-09-29 DIAGNOSIS — R97.20 ELEVATED PSA: Primary | ICD-10-CM

## 2023-09-29 LAB — PSA SERPL-MCNC: 4.26 NG/ML (ref 0–4)

## 2023-09-29 PROCEDURE — 36415 COLL VENOUS BLD VENIPUNCTURE: CPT

## 2023-09-29 PROCEDURE — 84153 ASSAY OF PSA TOTAL: CPT

## 2023-09-29 NOTE — TELEPHONE ENCOUNTER
Patient seen by Jazmine Valentin at [facility]      Patient called stating he went to the lab for psa but no oder in epic,  Patient has follow up appointment on 10/05/23 with Jazmine Valentin. He is going back to the lab this afternoon and would like to have psa in system.     Patient can be reached at [phone]

## 2023-10-05 ENCOUNTER — OFFICE VISIT (OUTPATIENT)
Dept: UROLOGY | Facility: CLINIC | Age: 68
End: 2023-10-05
Payer: MEDICARE

## 2023-10-05 VITALS
SYSTOLIC BLOOD PRESSURE: 120 MMHG | WEIGHT: 189 LBS | BODY MASS INDEX: 29.6 KG/M2 | DIASTOLIC BLOOD PRESSURE: 78 MMHG | OXYGEN SATURATION: 98 % | HEART RATE: 59 BPM

## 2023-10-05 DIAGNOSIS — R97.20 ELEVATED PSA: Primary | ICD-10-CM

## 2023-10-05 PROCEDURE — 99213 OFFICE O/P EST LOW 20 MIN: CPT | Performed by: PHYSICIAN ASSISTANT

## 2023-10-05 NOTE — PROGRESS NOTES
UROLOGY PROGRESS NOTE   Patient Identifiers: Chana Yo (MRN [id number])  Date of Service: 10/5/2023    Subjective:   History of elevated PSA. A germ cell mutation which is linked to increased cancer risk. I saw him in April with history of elevated PSA from in the mid twos up to 4. He had a multiparametric MRI showing PI-RADS 2 with his prostate size of 49 g. PSA currently 4.26. He believes he had sexual activity prior to the test.  He has no outlet complaints.     Reason for visit: Elevated PSA follow-up    Objective:     VITALS:    Vitals:    10/05/23 1125   BP: 120/78   Pulse: 59   SpO2: 98%     AUA SYMPTOM SCORE    Flowsheet Row Most Recent Value   AUA SYMPTOM SCORE    How often have you had a sensation of not emptying your bladder completely after you finished urinating? 1 (P)     How often have you had to urinate again less than two hours after you finished urinating? 1 (P)     How often have you found you stopped and started again several times when you urinate? 1 (P)     How often have you found it difficult to postpone urination? 1 (P)     How often have you had a weak urinary stream? 1 (P)     How often have you had to push or strain to begin urination? 1 (P)     How many times did you most typically get up to urinate from the time you went to bed at night until the time you got up in the morning? 1 (P)     Quality of Life: If you were to spend the rest of your life with your urinary condition just the way it is now, how would you feel about that? 1 (P)     AUA SYMPTOM SCORE 7 (P)             LABS:  Lab Results   Component Value Date    HGB 11.3 (L) 08/21/2023    HCT 36.7 08/21/2023    WBC 5.12 08/21/2023     08/21/2023   ]    Lab Results   Component Value Date     01/30/2015    K 3.3 (L) 08/21/2023     08/21/2023    CO2 30 08/21/2023    BUN 17 08/21/2023    CREATININE 0.84 08/21/2023    CALCIUM 9.0 08/21/2023    GLUCOSE 86 01/30/2015   ]        INPATIENT MEDS:    Current Outpatient Medications:   •  amLODIPine (NORVASC) 10 mg tablet, TAKE 1 TABLET BY MOUTH EVERY DAY, Disp: 90 tablet, Rfl: 1  •  Ascorbic Acid (VITAMIN C) 1000 MG tablet, Take 2 tablets by mouth daily, Disp: , Rfl:   •  B Complex Vitamins (VITAMIN B-COMPLEX 100 IJ), Take by mouth, Disp: , Rfl:   •  BETA CAROTENE PO, Take by mouth, Disp: , Rfl:   •  CHROMIUM PO, Take by mouth, Disp: , Rfl:   •  coenzyme Q-10 100 MG capsule, Take by mouth, Disp: , Rfl:   •  Lactobacillus (ACIDOPHILUS PO), Take by mouth, Disp: , Rfl:   •  Multiple Vitamin-Folic Acid TABS, Take by mouth, Disp: , Rfl:   •  olmesartan-hydrochlorothiazide (BENICAR HCT) 40-25 MG per tablet, Take 1 tablet by mouth daily, Disp: 30 tablet, Rfl: 0  •  Omega-3 Fatty Acids (FISH OIL) 1,000 mg, Take 2 tablets by mouth daily, Disp: , Rfl:   •  potassium chloride (K-DUR,KLOR-CON) 20 mEq tablet, Take 1 tablet (20 mEq total) by mouth daily, Disp: 90 tablet, Rfl: 1  •  rosuvastatin (CRESTOR) 5 mg tablet, TAKE 1 TABLET BY MOUTH EVERY DAY, Disp: 90 tablet, Rfl: 1  •  vitamin E, tocopherol, 400 units capsule, Take 400 Units by mouth 2 (two) times a day, Disp: , Rfl:       Physical Exam:   /78 (BP Location: Left arm, Patient Position: Sitting, Cuff Size: Large)   Pulse 59   Wt 85.7 kg (189 lb)   SpO2 98%   BMI 29.60 kg/m²   GEN: no acute distress    RESP: breathing comfortably with no accessory muscle use    ABD: soft, non-tender, non-distended   INCISION:    EXT: no significant peripheral edema       RADIOLOGY:   none     Assessment:   #1.   Elevated PSA    Plan:   -Follow-up in 6 months with PSA prior to visit  -  -  -

## 2023-10-17 DIAGNOSIS — I10 ESSENTIAL HYPERTENSION: ICD-10-CM

## 2023-10-17 RX ORDER — OLMESARTAN MEDOXOMIL AND HYDROCHLOROTHIAZIDE 40/25 40; 25 MG/1; MG/1
1 TABLET ORAL DAILY
Qty: 90 TABLET | Refills: 1 | Status: SHIPPED | OUTPATIENT
Start: 2023-10-17

## 2023-10-27 ENCOUNTER — OFFICE VISIT (OUTPATIENT)
Dept: GASTROENTEROLOGY | Facility: CLINIC | Age: 68
End: 2023-10-27
Payer: MEDICARE

## 2023-10-27 VITALS
TEMPERATURE: 96.8 F | WEIGHT: 194.8 LBS | DIASTOLIC BLOOD PRESSURE: 78 MMHG | HEART RATE: 64 BPM | BODY MASS INDEX: 30.57 KG/M2 | SYSTOLIC BLOOD PRESSURE: 110 MMHG | HEIGHT: 67 IN

## 2023-10-27 DIAGNOSIS — R17 ELEVATED BILIRUBIN: ICD-10-CM

## 2023-10-27 DIAGNOSIS — Z15.89 BIALLELIC MUTATION OF ATM GENE: Primary | ICD-10-CM

## 2023-10-27 DIAGNOSIS — K76.0 FATTY LIVER: ICD-10-CM

## 2023-10-27 DIAGNOSIS — Z11.59 ENCOUNTER FOR SCREENING FOR OTHER VIRAL DISEASES: ICD-10-CM

## 2023-10-27 PROCEDURE — 99214 OFFICE O/P EST MOD 30 MIN: CPT | Performed by: STUDENT IN AN ORGANIZED HEALTH CARE EDUCATION/TRAINING PROGRAM

## 2023-10-27 NOTE — PROGRESS NOTES
Patient seen and examined with Dr. Sharmila Galan and agree with her note with the following additions/exceptions:    77year-old male with history of metabolic syndrome who presents for initial evaluation for abnormal liver tests. He was incidentally found to have mildly elevated hyperbilirubinemia on routine labs. He previously had abnormal liver tests dating back to 2016 with peak elevation in serum ALT to 80. Most recent labs showed normalization of serum transaminases in August 2023 after weight loss. His liver synthetic function and platelets are otherwise normal.  He had an RUQ US which showed hepatomegaly with hepatic steatosis. We discussed that he likely has NAFLD given his metabolic risk factors but will complete a serologic work-up to exclude other causes of liver disease. He should also have an ultrasound elastography to stage his fibrosis. Would also recommend referral to Dr. Asad Deng for pancreatic cancer screening guidelines for history of OSMANY mutation. We discussed the natural history, prognosis, and complications of cirrhosis, including decompensation in the form of ascites, variceal bleeding, and hepatic encephalopathy as well as risk for development of HCC. We discussed that weight loss of at least 5 to 10% of his body weight and aggressive modification of his metabolic risk factors are payne in preventing progression of disease. Otherwise, he should return to clinic in 4 months or sooner if needed. Thank you for the opportunity to consult in his care.     Jennifer Haque MD

## 2023-10-27 NOTE — PROGRESS NOTES
[facility] - Outpatient Consultation  Soniya Georges. 77 y.o. male MRN: [id number]  Encounter: [id number]      PCP: Nacho Diallo DO  Referring: Nacho Diallo DO  [address],  [address] [facility] [place]      ASSESSMENT AND PLAN:    77 yr old M w/ history of metabolic syndrome ( HTN, central obesity and HLD) who presents to GI clinic for evaluation of fatty liver. 1. 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 No further testting is needed. - Ambulatory Referral to Hepatology    2. Fatty liver    Based on non-invasive assessments of hepatic fibrosis, He has absence of advanced fibrosis. Currently, there are no medications that are FDA approved for the specific management of fatty liver disease. We discussed that weight loss through lifestyle modification, diet and exercise is key in the management of NAFLD. he should participate in moderate exercise 3-4 times a week for at least 30 minutes at a time. A goal of 5-10% is recommended to decrease the risks and complications from fatty liver disease. He does not want to meet with weight management right now as he is able to lose weight with lifestyle modification   He will get hepatitis B and C serologies  Check alpha 1 antitrypsin phenotype   Check iron panel     - Ambulatory Referral to Hepatology  - Iron Panel (Includes Ferritin, Iron Sat%, Iron, and TIBC); Future  - Hepatitis B surface antibody; Future  - Hepatitis A antibody, total; Future  - Hepatitis B core antibody, total; Future  - Hepatitis B surface antigen; Future  - Alpha 1 Antitrypsin Phenotype; Future  - Hemoglobin A1C; Future  - US elastography/UGAP; Future      3. Mutation of OSMANY gene    Patient has OSMANY gene mutation. He was screened because his brother was diagnosed with colon cancer at a young age. We discussed that OSMANY gene can increase his risk of pancreatic cancer. We discussed there are no clear guidelines for pancreatic cancer screening, referral to Dr. Bernardo Lynne provided     - Ambulatory Referral to Gastroenterology; Future       Mr. Carlos Delgadillo will have the testing done as outlined above and follow-up in 4 months.         ______________________________________________________________________    CC:  Chief Complaint   Patient presents with    Fatty liver       HPI: 77 yr old M w/ history of metabolic syndrome ( HTN, central obesity and HLD) who presents to GI clinic for evaluation of fatty liver. Patient had an [address] liver done which showed hepatomegaly with fatty infiltration. He states he drinks alcohol only socially. He has been working on weight loss. Fibrosis-4 (FIB-4) Score is: .77    Indication for testing Absence of advanced fibrosis Presence of advanced fibrosis Indeterminate result   NAFLD <2.00 >2.67 2.00-2.67   Hepatitis C <1.45 >3.25 1.45-3.25   Hepatitis B <1.00 >2.65 1.00-2.65     FIB-4 Score Component Values:  Component Value Date   Age: 77 y.o. AST: 14 U/L 8/21/2023   Platelet: 433 Thousands/uL 8/21/2023   ALT: 20 U/L 8/21/2023        REVIEW OF SYSTEMS:    CONSTITUTIONAL: Denies any fever, chills, rigors, and weight loss. HEENT: No earache or tinnitus. Denies hearing loss or visual disturbances. CARDIOVASCULAR: No chest pain or palpitations. RESPIRATORY: Denies any cough, hemoptysis, shortness of breath or dyspnea on exertion. GASTROINTESTINAL: As noted in the History of Present Illness. GENITOURINARY: No problems with urination. Denies any hematuria or dysuria. NEUROLOGIC: No dizziness or vertigo, denies headaches. MUSCULOSKELETAL: Denies any muscle or joint pain. SKIN: Denies skin rashes or itching. ENDOCRINE: Denies excessive thirst. Denies intolerance to heat or cold. PSYCHOSOCIAL: Denies depression or anxiety. Denies any recent memory loss.        Historical Information   Past Medical History:   Diagnosis Date    Allergic 1998 Anemia 1975    Cervicalgia 10/29/2014    Colon polyp     Hyperlipidemia     Hypertension 2006    Prostatic hypertrophy     Benign     Past Surgical History:   Procedure Laterality Date    COLONOSCOPY      SKIN TAG REMOVAL      VASECTOMY      Vas Deferens     Social History   Social History     Substance and Sexual Activity   Alcohol Use Yes    Alcohol/week: 5.0 standard drinks of alcohol    Types: 3 Glasses of wine, 2 Cans of beer per week    Comment: drinks wine     Social History     Substance and Sexual Activity   Drug Use No     Social History     Tobacco Use   Smoking Status Never   Smokeless Tobacco Never     Family History   Problem Relation Age of Onset    Thalassemia Mother     Heart Valve Disease Mother         heart valve replaced    Hypertension Father     Stomach cancer Father     Cancer Father         Stomach cancer    Colon cancer Brother         mass in colon, suspected cancer-dx'd at 49 y/o    Valvular heart disease Brother         bicuspid aortic valve    Alcohol abuse Neg Hx     Substance Abuse Neg Hx     Mental illness Neg Hx     Depression Neg Hx        Meds/Allergies       Current Outpatient Medications:     amLODIPine (NORVASC) 10 mg tablet    Ascorbic Acid (VITAMIN C) 1000 MG tablet    B Complex Vitamins (VITAMIN B-COMPLEX 100 IJ)    BETA CAROTENE PO    CHROMIUM PO    coenzyme Q-10 100 MG capsule    Lactobacillus (ACIDOPHILUS PO)    Multiple Vitamin-Folic Acid TABS    olmesartan-hydrochlorothiazide (BENICAR HCT) 40-25 MG per tablet    Omega-3 Fatty Acids (FISH OIL) 1,000 mg    potassium chloride (K-DUR,KLOR-CON) 20 mEq tablet    rosuvastatin (CRESTOR) 5 mg tablet    vitamin E, tocopherol, 400 units capsule    Allergies   Allergen Reactions    Albuterol Throat Swelling           Objective     Blood pressure 110/78, pulse 64, temperature (!) 96.8 °F (36 °C), temperature source Tympanic, height 5' 7" (1.702 m), weight 88.4 kg (194 lb 12.8 oz). Body mass index is 30.51 kg/m².      PHYSICAL EXAM: General Appearance:   Alert, cooperative, no distress   HEENT:   Normocephalic, atraumatic, anicteric. Neck:  Supple, symmetrical, trachea midline   Lungs:   Clear to auscultation bilaterally; no rales, rhonchi or wheezing; respirations unlabored    Heart[de-identified]   Regular rate and rhythm; no murmur, rub, or gallop. Abdomen:   Soft, non-tender, non-distended; normal bowel sounds; no masses, no organomegaly    Genitalia:   Deferred    Rectal:   Deferred    Extremities:  No cyanosis, clubbing or edema    Pulses:  2+ and symmetric    Skin:  No jaundice, rashes, or lesions    Lymph nodes:  No palpable cervical lymphadenopathy        Lab Results:     Lab Results   Component Value Date    WBC 5.12 08/21/2023    HGB 11.3 (L) 08/21/2023    HCT 36.7 08/21/2023    MCV 64 (L) 08/21/2023     08/21/2023       Lab Results   Component Value Date     01/30/2015    K 3.3 (L) 08/21/2023     08/21/2023    CO2 30 08/21/2023    ANIONGAP 2 (L) 01/30/2015    BUN 17 08/21/2023    CREATININE 0.84 08/21/2023    GLUCOSE 86 01/30/2015    GLUF 90 08/21/2023    CALCIUM 9.0 08/21/2023    AST 14 08/21/2023    ALT 20 08/21/2023    ALKPHOS 54 08/21/2023    PROT 7.1 01/30/2015    BILITOT 0.7 01/30/2015    EGFR 88 08/21/2023       No results found for: "INR", "PROTIME"      Radiology Results:   No results found. Portions of the record may have been created with voice recognition software. Occasional wrong word or "sound a like" substitutions may have occurred due to the inherent limitations of voice recognition software. Read the chart carefully and recognize, using context, where substitutions have occurred.

## 2023-11-02 ENCOUNTER — APPOINTMENT (OUTPATIENT)
Dept: LAB | Facility: CLINIC | Age: 68
End: 2023-11-02
Payer: MEDICARE

## 2023-11-02 ENCOUNTER — HOSPITAL ENCOUNTER (OUTPATIENT)
Dept: ULTRASOUND IMAGING | Facility: HOSPITAL | Age: 68
Discharge: HOME | End: 2023-11-02
Payer: MEDICARE

## 2023-11-02 DIAGNOSIS — K76.0 FATTY LIVER: ICD-10-CM

## 2023-11-02 DIAGNOSIS — Z11.59 ENCOUNTER FOR SCREENING FOR OTHER VIRAL DISEASES: ICD-10-CM

## 2023-11-02 DIAGNOSIS — R97.20 ELEVATED PSA: ICD-10-CM

## 2023-11-02 LAB
EST. AVERAGE GLUCOSE BLD GHB EST-MCNC: 117 MG/DL
FERRITIN SERPL-MCNC: 44 NG/ML (ref 24–336)
HAV AB SER QL IA: NORMAL
HBA1C MFR BLD: 5.7 %
HBV CORE AB SER QL: NORMAL
HBV SURFACE AB SER-ACNC: <3 MIU/ML
HBV SURFACE AG SER QL: NORMAL
HCV AB SER QL: NORMAL
IRON SATN MFR SERPL: 23 % (ref 15–50)
IRON SERPL-MCNC: 84 UG/DL (ref 50–212)
TIBC SERPL-MCNC: 369 UG/DL (ref 250–450)
UIBC SERPL-MCNC: 285 UG/DL (ref 155–355)

## 2023-11-02 PROCEDURE — 86704 HEP B CORE ANTIBODY TOTAL: CPT

## 2023-11-02 PROCEDURE — 86803 HEPATITIS C AB TEST: CPT

## 2023-11-02 PROCEDURE — 82103 ALPHA-1-ANTITRYPSIN TOTAL: CPT

## 2023-11-02 PROCEDURE — 83550 IRON BINDING TEST: CPT

## 2023-11-02 PROCEDURE — 36415 COLL VENOUS BLD VENIPUNCTURE: CPT

## 2023-11-02 PROCEDURE — 82104 ALPHA-1-ANTITRYPSIN PHENO: CPT

## 2023-11-02 PROCEDURE — 86706 HEP B SURFACE ANTIBODY: CPT

## 2023-11-02 PROCEDURE — 86708 HEPATITIS A ANTIBODY: CPT

## 2023-11-02 PROCEDURE — 76981 USE PARENCHYMA: CPT

## 2023-11-02 PROCEDURE — 83540 ASSAY OF IRON: CPT

## 2023-11-02 PROCEDURE — 82728 ASSAY OF FERRITIN: CPT

## 2023-11-02 PROCEDURE — 83036 HEMOGLOBIN GLYCOSYLATED A1C: CPT

## 2023-11-02 PROCEDURE — 87340 HEPATITIS B SURFACE AG IA: CPT

## 2023-11-10 LAB
A1AT PHENOTYP SERPL IFE: NORMAL
A1AT SERPL-MCNC: 151 MG/DL (ref 101–187)

## 2023-12-21 ENCOUNTER — OFFICE VISIT (OUTPATIENT)
Dept: GASTROENTEROLOGY | Facility: MEDICAL CENTER | Age: 68
End: 2023-12-21
Payer: MEDICARE

## 2023-12-21 VITALS
HEIGHT: 67 IN | HEART RATE: 63 BPM | TEMPERATURE: 96.9 F | DIASTOLIC BLOOD PRESSURE: 85 MMHG | BODY MASS INDEX: 30.45 KG/M2 | SYSTOLIC BLOOD PRESSURE: 141 MMHG | WEIGHT: 194 LBS | OXYGEN SATURATION: 98 %

## 2023-12-21 DIAGNOSIS — K64.9 HEMORRHOIDS, UNSPECIFIED HEMORRHOID TYPE: ICD-10-CM

## 2023-12-21 DIAGNOSIS — Z15.01 MONOALLELIC MUTATION OF ATM GENE: Primary | ICD-10-CM

## 2023-12-21 DIAGNOSIS — Z15.89 MONOALLELIC MUTATION OF ATM GENE: Primary | ICD-10-CM

## 2023-12-21 DIAGNOSIS — Z15.09 MONOALLELIC MUTATION OF ATM GENE: Primary | ICD-10-CM

## 2023-12-21 DIAGNOSIS — Z80.0 FAMILY HISTORY OF GASTRIC CANCER: ICD-10-CM

## 2023-12-21 PROCEDURE — 99213 OFFICE O/P EST LOW 20 MIN: CPT | Performed by: STUDENT IN AN ORGANIZED HEALTH CARE EDUCATION/TRAINING PROGRAM

## 2023-12-21 NOTE — PROGRESS NOTES
ASC Screening    ASC Screening  BMI > than 45: No  Are you currently pregnant?: No  Do you rely on a wheelchair for mobility?: No  Do you need oxygen during the day?: No  Have you ever been informed by anesthesia that you have a difficult airway?: No  Have you been diagnosed with End Stage Renal Disease (ESRD)?: No  Are you actively on dialysis?: No  Have you been diagnosed with Pulmonary Hypertension?: No  Do you have a pacemaker or an Automatic Implantable Cardioverter Defibrillator (AICD)?: No  Have you ever had an organ transplant?: No  Have you had a stroke, heart attack, myocardial infarction (MI) within the last 6 months?: No  Have you ever been diagnosed with Aortic Stenosis?: No  Have you ever been diagnosed  with Congestive Heart Failure?: No  Have you ever been diagnosed with a heart valve disease?: No  Are you Diabetic?: No  If you are Diabetic, has your A1C been greater than 12 within the last six months?: N/A

## 2023-12-21 NOTE — PROGRESS NOTES
[facility] - Outpatient Consultation  Dom Malone Jr. 68 y.o. male MRN: [id number]  Encounter: [id number]          ASSESSMENT AND PLAN:    68-year-old male with hypertension, hyperlipidemia, BMI 30, fatty liver referred for OSMANY mutation.  He has been receiving regular colonoscopies and is up-to-date on this.  He also follows with urology and is undergoing active prostate surveillance.  He reports 2 first-degree relatives with gastric cancer. Gastric cancer risk has been associated with OSMANY mutations though screening guidelines are unclear.  Also discussed pancreatic cancer screening which he does not currently have an indication for given a lack of family history of pancreatic cancer.  1. Monoallelic mutation of OSMANY gene  2. Family history of gastric cancer  - Ambulatory Referral to Gastroenterology  - EGD; Future  - Current guidelines recommend pancreatic cancer screening only patients with OSMANY mutation and a family history of pancreatic cancer.  Discussed that should he have a family history of PDAC, would pursue screening with MRI or EUS    3. Hemorrhoids, unspecified hemorrhoid type  2023 colonoscopy report mentions surgical management of hemorrhoids if they become bothersome. Patient is aware of this and not currently interested    ______________________________________________________________________    HPI:    Brother was diagnosed with colon cancer at age 50. Was found to have OSMANY mutation.  Brother now has stomach cancer, about to start fruquininib    No family history of pancreatic cancer.  Father had gastric cancer.     No abdominal pain, nausea, vomiting, reflux. Gets hemorrhoids which he's had for 30 years. Tries to eat a lot of fiber.  Would like to get rid of them but doesn't want to go through a painful procedure    Last colonoscopy 5/26/23 with no polyps.  2019 with polyps  No prior EGD.    Has tried to lose weight and has lost about 10#.    Reviewed 2/25/2020  genetic counseling note and results.  Heterozygous OSMANY mutation and MSH6 VUS      REVIEW OF SYSTEMS:    CONSTITUTIONAL: Denies any fever, chills, rigors, and weight loss.  HEENT: No earache or tinnitus. Denies hearing loss or visual disturbances.  CARDIOVASCULAR: No chest pain or palpitations.   RESPIRATORY: Denies any cough, hemoptysis, shortness of breath or dyspnea on exertion.  GASTROINTESTINAL: As noted in the History of Present Illness.   GENITOURINARY: No problems with urination. Denies any hematuria or dysuria.  NEUROLOGIC: No dizziness or vertigo, denies headaches.   MUSCULOSKELETAL: Denies any muscle or joint pain.   SKIN: Denies skin rashes or itching.   ENDOCRINE: Denies excessive thirst. Denies intolerance to heat or cold.  PSYCHOSOCIAL: Denies depression or anxiety. Denies any recent memory loss.       Historical Information   Past Medical History:   Diagnosis Date    Allergic 1998    Anemia 1975    Cervicalgia 10/29/2014    Colon polyp     Hyperlipidemia     Hypertension 2006    Prostatic hypertrophy     Benign     Past Surgical History:   Procedure Laterality Date    COLONOSCOPY      SKIN TAG REMOVAL      VASECTOMY      Vas Deferens     Social History   Social History     Substance and Sexual Activity   Alcohol Use Yes    Alcohol/week: 5.0 standard drinks of alcohol    Types: 3 Glasses of wine, 2 Cans of beer per week    Comment: drinks wine     Social History     Substance and Sexual Activity   Drug Use No     Social History     Tobacco Use   Smoking Status Never   Smokeless Tobacco Never     Family History   Problem Relation Age of Onset    Thalassemia Mother     Heart Valve Disease Mother         heart valve replaced    Hypertension Father     Stomach cancer Father     Cancer Father         Stomach cancer    Colon cancer Brother 50        mass in colon, suspected cancer-dx'd at 49 y/o    Valvular heart disease Brother         bicuspid aortic valve    Alcohol abuse Neg Hx     Substance Abuse Neg Hx  "    Mental illness Neg Hx     Depression Neg Hx        Meds/Allergies       Current Outpatient Medications:     amLODIPine (NORVASC) 10 mg tablet    Ascorbic Acid (VITAMIN C) 1000 MG tablet    B Complex Vitamins (VITAMIN B-COMPLEX 100 IJ)    BETA CAROTENE PO    CHROMIUM PO    coenzyme Q-10 100 MG capsule    Lactobacillus (ACIDOPHILUS PO)    Multiple Vitamin-Folic Acid TABS    olmesartan-hydrochlorothiazide (BENICAR HCT) 40-25 MG per tablet    Omega-3 Fatty Acids (FISH OIL) 1,000 mg    potassium chloride (K-DUR,KLOR-CON) 20 mEq tablet    rosuvastatin (CRESTOR) 5 mg tablet    vitamin E, tocopherol, 400 units capsule    Allergies   Allergen Reactions    Albuterol Throat Swelling           Objective     Blood pressure 141/85, pulse 63, temperature (!) 96.9 °F (36.1 °C), height 5' 7\" (1.702 m), weight 88 kg (194 lb), SpO2 98%. Body mass index is 30.38 kg/m².        PHYSICAL EXAM:      General Appearance:   Alert, cooperative, no distress   HEENT:   Normocephalic, atraumatic, anicteric.     Neck:  Supple, symmetrical, trachea midline   Lungs:   Clear to auscultation bilaterally; no rales, rhonchi or wheezing; respirations unlabored    Heart::   Regular rate and rhythm; no murmur, rub, or gallop.   Abdomen:   Soft, non-tender, non-distended; normal bowel sounds; no masses, no organomegaly    Genitalia:   Deferred    Rectal:   Deferred    Extremities:  No cyanosis, clubbing or edema    Pulses:  2+ and symmetric    Skin:  No jaundice, rashes, or lesions    Lymph nodes:  No palpable cervical lymphadenopathy        Lab Results:   No visits with results within 1 Day(s) from this visit.   Latest known visit with results is:   Appointment on 11/02/2023   Component Date Value    Hep B S Ab 11/02/2023 <3.00     Hep A Total Ab 11/02/2023 Non-reactive     Hep B Core Total Ab 11/02/2023 Non-reactive     Hepatitis B Surface Ag 11/02/2023 Non-reactive     A-1 Antitrypsin 11/02/2023 151     A-1 Antitrypsin Pheno 11/02/2023 MM     " Hemoglobin A1C 11/02/2023 5.7 (H)     EAG 11/02/2023 117     Hepatitis C Ab 11/02/2023 Non-reactive     Iron Saturation 11/02/2023 23     TIBC 11/02/2023 369     Iron 11/02/2023 84     UIBC 11/02/2023 285     Ferritin 11/02/2023 44          Radiology Results:   No results found.    Answers submitted by the patient for this visit:  Abdominal Pain Questionnaire (Submitted on 12/21/2023)  Chief Complaint: Abdominal pain  Progression since onset: resolved  Pain location: LLQ  Pain - numeric: 0/10  Pain quality: dull  Radiates to: does not radiate  anorexia: No  arthralgias: Yes  belching: No  constipation: No  diarrhea: No  dysuria: No  fever: No  flatus: No  frequency: No  headaches: No  hematochezia: No  hematuria: No  melena: No  myalgias: No  nausea: No  weight loss: No  vomiting: No  Aggravated by: nothing  Relieved by: liquids

## 2024-01-09 NOTE — ANESTHESIA POSTPROCEDURE EVALUATION
Post-Op Assessment Note    CV Status:  Stable  Pain Score: 0    Pain management: adequate     Mental Status:  Alert and awake   Hydration Status:  Euvolemic   PONV Controlled:  Controlled   Airway Patency:  Patent   Post Op Vitals Reviewed: Yes      Staff: CRNA           /67 (08/23/19 1138)    Temp 97 8 °F (36 6 °C) (08/23/19 1138)    Pulse 60 (08/23/19 1138)   Resp 18 (08/23/19 1138)    SpO2 100 % (08/23/19 1138)
Prep Text: The patient's skin was cleaned and prepped.
Vacuum Pressure: 10
Treatment Number: 1
Wand: Medium
Consent: Written consent obtained, risks reviewed including but not limited to crusting, scabbing, blistering, scarring, darker or lighter pigmentary change, bruising, and/or incomplete response.
Indication: pigmentation abnormalities
Number Of Passes: 3
Price (Use Numbers Only, No Special Characters Or $): 135.00
Detail Level: Generalized

## 2024-01-19 DIAGNOSIS — I10 ESSENTIAL HYPERTENSION: ICD-10-CM

## 2024-01-19 RX ORDER — AMLODIPINE BESYLATE 10 MG/1
TABLET ORAL
Qty: 90 TABLET | Refills: 1 | Status: SHIPPED | OUTPATIENT
Start: 2024-01-19

## 2024-02-09 DIAGNOSIS — Z00.6 ENCOUNTER FOR EXAMINATION FOR NORMAL COMPARISON OR CONTROL IN CLINICAL RESEARCH PROGRAM: ICD-10-CM

## 2024-02-22 ENCOUNTER — APPOINTMENT (OUTPATIENT)
Dept: LAB | Facility: CLINIC | Age: 69
End: 2024-02-22
Payer: MEDICARE

## 2024-02-22 DIAGNOSIS — Z91.89 FRAMINGHAM CARDIAC RISK 10-20% IN NEXT 10 YEARS: ICD-10-CM

## 2024-02-22 DIAGNOSIS — I10 ESSENTIAL HYPERTENSION: ICD-10-CM

## 2024-02-22 DIAGNOSIS — Z00.6 ENCOUNTER FOR EXAMINATION FOR NORMAL COMPARISON OR CONTROL IN CLINICAL RESEARCH PROGRAM: ICD-10-CM

## 2024-02-22 DIAGNOSIS — D56.8: ICD-10-CM

## 2024-02-22 DIAGNOSIS — E78.00 ELEVATED LDL CHOLESTEROL LEVEL: ICD-10-CM

## 2024-02-22 LAB
ALBUMIN SERPL BCP-MCNC: 4.5 G/DL (ref 3.5–5)
ALP SERPL-CCNC: 57 U/L (ref 34–104)
ALT SERPL W P-5'-P-CCNC: 25 U/L (ref 7–52)
ANION GAP SERPL CALCULATED.3IONS-SCNC: 8 MMOL/L
AST SERPL W P-5'-P-CCNC: 16 U/L (ref 13–39)
BASOPHILS # BLD AUTO: 0.04 THOUSANDS/ÂΜL (ref 0–0.1)
BASOPHILS NFR BLD AUTO: 1 % (ref 0–1)
BILIRUB SERPL-MCNC: 1.32 MG/DL (ref 0.2–1)
BUN SERPL-MCNC: 17 MG/DL (ref 5–25)
CALCIUM SERPL-MCNC: 9.3 MG/DL (ref 8.4–10.2)
CHLORIDE SERPL-SCNC: 105 MMOL/L (ref 96–108)
CHOLEST SERPL-MCNC: 126 MG/DL
CO2 SERPL-SCNC: 29 MMOL/L (ref 21–32)
CREAT SERPL-MCNC: 0.8 MG/DL (ref 0.6–1.3)
EOSINOPHIL # BLD AUTO: 0.25 THOUSAND/ÂΜL (ref 0–0.61)
EOSINOPHIL NFR BLD AUTO: 4 % (ref 0–6)
ERYTHROCYTE [DISTWIDTH] IN BLOOD BY AUTOMATED COUNT: 18.3 % (ref 11.6–15.1)
GFR SERPL CREATININE-BSD FRML MDRD: 89 ML/MIN/1.73SQ M
GLUCOSE P FAST SERPL-MCNC: 83 MG/DL (ref 65–99)
HCT VFR BLD AUTO: 37.4 % (ref 36.5–49.3)
HDLC SERPL-MCNC: 42 MG/DL
HGB BLD-MCNC: 11.4 G/DL (ref 12–17)
IMM GRANULOCYTES # BLD AUTO: 0.03 THOUSAND/UL (ref 0–0.2)
IMM GRANULOCYTES NFR BLD AUTO: 1 % (ref 0–2)
LDLC SERPL CALC-MCNC: 60 MG/DL (ref 0–100)
LYMPHOCYTES # BLD AUTO: 0.83 THOUSANDS/ÂΜL (ref 0.6–4.47)
LYMPHOCYTES NFR BLD AUTO: 13 % (ref 14–44)
MCH RBC QN AUTO: 19.2 PG (ref 26.8–34.3)
MCHC RBC AUTO-ENTMCNC: 30.5 G/DL (ref 31.4–37.4)
MCV RBC AUTO: 63 FL (ref 82–98)
MONOCYTES # BLD AUTO: 0.53 THOUSAND/ÂΜL (ref 0.17–1.22)
MONOCYTES NFR BLD AUTO: 8 % (ref 4–12)
NEUTROPHILS # BLD AUTO: 4.81 THOUSANDS/ÂΜL (ref 1.85–7.62)
NEUTS SEG NFR BLD AUTO: 73 % (ref 43–75)
NRBC BLD AUTO-RTO: 0 /100 WBCS
PLATELET # BLD AUTO: 292 THOUSANDS/UL (ref 149–390)
PMV BLD AUTO: 11.5 FL (ref 8.9–12.7)
POTASSIUM SERPL-SCNC: 3.3 MMOL/L (ref 3.5–5.3)
PROT SERPL-MCNC: 7.3 G/DL (ref 6.4–8.4)
RBC # BLD AUTO: 5.93 MILLION/UL (ref 3.88–5.62)
SODIUM SERPL-SCNC: 142 MMOL/L (ref 135–147)
TRIGL SERPL-MCNC: 120 MG/DL
WBC # BLD AUTO: 6.49 THOUSAND/UL (ref 4.31–10.16)

## 2024-02-22 PROCEDURE — 85025 COMPLETE CBC W/AUTO DIFF WBC: CPT

## 2024-02-22 PROCEDURE — 36415 COLL VENOUS BLD VENIPUNCTURE: CPT

## 2024-02-22 PROCEDURE — 80053 COMPREHEN METABOLIC PANEL: CPT

## 2024-02-22 PROCEDURE — 80061 LIPID PANEL: CPT

## 2024-02-26 ENCOUNTER — OFFICE VISIT (OUTPATIENT)
Dept: GASTROENTEROLOGY | Facility: CLINIC | Age: 69
End: 2024-02-26
Payer: MEDICARE

## 2024-02-26 VITALS
TEMPERATURE: 97.8 F | BODY MASS INDEX: 31.23 KG/M2 | HEIGHT: 67 IN | OXYGEN SATURATION: 99 % | SYSTOLIC BLOOD PRESSURE: 120 MMHG | DIASTOLIC BLOOD PRESSURE: 78 MMHG | WEIGHT: 199 LBS | HEART RATE: 76 BPM

## 2024-02-26 DIAGNOSIS — Z15.01 MONOALLELIC MUTATION OF ATM GENE: Primary | ICD-10-CM

## 2024-02-26 DIAGNOSIS — K76.0 NAFLD (NONALCOHOLIC FATTY LIVER DISEASE): ICD-10-CM

## 2024-02-26 DIAGNOSIS — Z12.11 SCREENING FOR COLON CANCER: ICD-10-CM

## 2024-02-26 DIAGNOSIS — Z15.09 MONOALLELIC MUTATION OF ATM GENE: Primary | ICD-10-CM

## 2024-02-26 DIAGNOSIS — Z15.89 MONOALLELIC MUTATION OF ATM GENE: Primary | ICD-10-CM

## 2024-02-26 DIAGNOSIS — E80.6 HYPERBILIRUBINEMIA: ICD-10-CM

## 2024-02-26 PROCEDURE — 99213 OFFICE O/P EST LOW 20 MIN: CPT | Performed by: FAMILY MEDICINE

## 2024-02-26 NOTE — PROGRESS NOTES
[facility] - Outpatient Follow-up Note  Dom Malone Jr. 69 y.o. male MRN: [id number]  Encounter: [id number]          ASSESSMENT AND PLAN:      1. NAFLD (nonalcoholic fatty liver disease)  2. Hyperbilirubinemia  Patient was noted to have mild and chronic hyperbilirubinemia dating back to 2016. RUQ US was notable for hepatomegaly (19 cm) and marked fatty infiltration but with normal biliary anatomy. He also had mildly elevated serum transaminases, when compared to trend normal, which have been well within normal limits since 2022 in the setting of weight loss. Since his last appointment, he completed a focused serologic workup negative for viral hepatitis and hemochromatosis as well as with a normal A1AT phenotype.  US elastography was with F0-F1 fibrosis and S3 steatosis.    Patient is aware of the basic pathophysiology of fatty liver disease, potential progress to cirrhosis if left untreated and recommendations for treatment to include steady and sustainable weight loss, optimization of his metabolic risk factors and limiting his alcohol consumption. We did discuss that his hemoglobin A1c was noted to be in the prediabetic range and that weight loss would likely improve this. Also encouraged him to work with his primary care provider for improved glycemic control.    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 He is aware to have drawn 1-2 weeks prior to his next follow-up appointment    Otherwise, it is recommended he continue to have his hepatic function monitored q6-12 months and repeating a US elastography q2-3 years, or as clinically indicated, to monitor for the development of fibrosis.    3. Monoallelic mutation of OSMANY gene  Patient was noted to have an OSMANY mutation. He has a family history of gastric cancer, including both his father  and brother, but no family history of pancreatic cancer. He has been seen in consultation by gastroenterology and recommended for an EGD but it was noted that current guidelines recommend pancreatic screening only in patients with OSMANY mutations and a family history of pancreatic cancer. He is aware to inform provide rif his family hx were to change.     4. Screening for colon cancer  Patient is up-to-date with CRC screening. Next due in 5/2026.    Follow-up in 6 months or sooner if necessary.   ______________________________________________________________________    SUBJECTIVE: Patient is a 69 y.o. male with PMH significant for obesity, prediabetes, HTN, HLD and a monoallelic mutation of OSMANY gene who presents today for follow-up regarding fatty liver disease.     Interval history  - Focused serologic workup was negative for viral hepatitis and hemochromatosis. Also with a normal A1AT phenotype. Patient without immunity to hepatitis A or B.  - Patient's hemoglobin A1c was noted to be in the prediabetic range.  - U/S elastography with F0-F1 fibrosis and S3 steatosis.    Extended liver history  He was incidentally found to have mildly elevated hyperbilirubinemia on routine labs.  He previously had abnormal liver tests dating back to 2016 with peak elevation in serum ALT to 80. Most recent labs showed normalization of serum transaminases in August 2023 after weight loss. His liver synthetic function and platelets are otherwise normal. He had an RUQ US which showed hepatomegaly with hepatic steatosis. Admits to social EtOH use.       REVIEW OF SYSTEMS IS OTHERWISE NEGATIVE.      Historical Information   Past Medical History:   Diagnosis Date   • Allergic 1998   • Anemia 1975   • Cervicalgia 10/29/2014   • Colon polyp    • Hyperlipidemia    • Hypertension 2006   • Prostatic hypertrophy     Benign     Past Surgical History:   Procedure Laterality Date   • COLONOSCOPY     • SKIN TAG REMOVAL     • VASECTOMY      Vas  "Deferens     Social History   Social History     Substance and Sexual Activity   Alcohol Use Yes   • Alcohol/week: 5.0 standard drinks of alcohol   • Types: 3 Glasses of wine, 2 Cans of beer per week    Comment: drinks wine     Social History     Substance and Sexual Activity   Drug Use No     Social History     Tobacco Use   Smoking Status Never   Smokeless Tobacco Never     Family History   Problem Relation Age of Onset   • Thalassemia Mother    • Heart Valve Disease Mother         heart valve replaced   • Hypertension Father    • Stomach cancer Father    • Cancer Father         Stomach cancer   • Colon cancer Brother 50        mass in colon, suspected cancer-dx'd at 51 y/o   • Valvular heart disease Brother         bicuspid aortic valve   • Alcohol abuse Neg Hx    • Substance Abuse Neg Hx    • Mental illness Neg Hx    • Depression Neg Hx        Meds/Allergies       Current Outpatient Medications:   •  amLODIPine (NORVASC) 10 mg tablet  •  Ascorbic Acid (VITAMIN C) 1000 MG tablet  •  B Complex Vitamins (VITAMIN B-COMPLEX 100 IJ)  •  BETA CAROTENE PO  •  CHROMIUM PO  •  coenzyme Q-10 100 MG capsule  •  Lactobacillus (ACIDOPHILUS PO)  •  Multiple Vitamin-Folic Acid TABS  •  olmesartan-hydrochlorothiazide (BENICAR HCT) 40-25 MG per tablet  •  Omega-3 Fatty Acids (FISH OIL) 1,000 mg  •  potassium chloride (K-DUR,KLOR-CON) 20 mEq tablet  •  rosuvastatin (CRESTOR) 5 mg tablet  •  vitamin E, tocopherol, 400 units capsule    Allergies   Allergen Reactions   • Albuterol Throat Swelling           Objective     Blood pressure 120/78, pulse 76, temperature 97.8 °F (36.6 °C), temperature source Tympanic, height 5' 7\" (1.702 m), weight 90.3 kg (199 lb), SpO2 99%. Body mass index is 31.17 kg/m².      PHYSICAL EXAM:      General Appearance:   Alert, cooperative, no distress   HEENT:   Normocephalic, atraumatic, anicteric.     Neck:  Supple, symmetrical, trachea midline   Lungs:   Clear to auscultation bilaterally; no rales, " rhonchi or wheezing; respirations unlabored    Heart::   Regular rate and rhythm; no murmur, rub, or gallop.   Abdomen:   Soft, non-tender, non-distended; normal bowel sounds; no masses, no organomegaly    Genitalia:   Deferred    Rectal:   Deferred    Extremities:  No cyanosis, clubbing or edema    Pulses:  2+ and symmetric    Skin:  No jaundice, rashes, or lesions    Lymph nodes:  No palpable cervical lymphadenopathy        Lab Results:   No visits with results within 1 Day(s) from this visit.   Latest known visit with results is:   Appointment on 02/22/2024   Component Date Value   • Sodium 02/22/2024 142    • Potassium 02/22/2024 3.3 (L)    • Chloride 02/22/2024 105    • CO2 02/22/2024 29    • ANION GAP 02/22/2024 8    • BUN 02/22/2024 17    • Creatinine 02/22/2024 0.80    • Glucose, Fasting 02/22/2024 83    • Calcium 02/22/2024 9.3    • AST 02/22/2024 16    • ALT 02/22/2024 25    • Alkaline Phosphatase 02/22/2024 57    • Total Protein 02/22/2024 7.3    • Albumin 02/22/2024 4.5    • Total Bilirubin 02/22/2024 1.32 (H)    • eGFR 02/22/2024 89    • Cholesterol 02/22/2024 126    • Triglycerides 02/22/2024 120    • HDL, Direct 02/22/2024 42    • LDL Calculated 02/22/2024 60    • WBC 02/22/2024 6.49    • RBC 02/22/2024 5.93 (H)    • Hemoglobin 02/22/2024 11.4 (L)    • Hematocrit 02/22/2024 37.4    • MCV 02/22/2024 63 (L)    • MCH 02/22/2024 19.2 (L)    • MCHC 02/22/2024 30.5 (L)    • RDW 02/22/2024 18.3 (H)    • MPV 02/22/2024 11.5    • Platelets 02/22/2024 292    • nRBC 02/22/2024 0    • Neutrophils Relative 02/22/2024 73    • Immat GRANS % 02/22/2024 1    • Lymphocytes Relative 02/22/2024 13 (L)    • Monocytes Relative 02/22/2024 8    • Eosinophils Relative 02/22/2024 4    • Basophils Relative 02/22/2024 1    • Neutrophils Absolute 02/22/2024 4.81    • Immature Grans Absolute 02/22/2024 0.03    • Lymphocytes Absolute 02/22/2024 0.83    • Monocytes Absolute 02/22/2024 0.53    • Eosinophils Absolute 02/22/2024 0.25     • Basophils Absolute 02/22/2024 0.04          Radiology Results:   No results found.    Michelle Urbina PA-C     **Please note: Dictation voice to text software may have been used in the creation of this record. Occasional wrong word or “sound alike” substitutions may have occurred due to the inherent limitations of voice recognition software. Read the chart carefully and recognize, using context, where substitutions have occurred.**

## 2024-02-28 DIAGNOSIS — Z91.89 FRAMINGHAM CARDIAC RISK 10-20% IN NEXT 10 YEARS: ICD-10-CM

## 2024-02-28 DIAGNOSIS — E78.00 ELEVATED LDL CHOLESTEROL LEVEL: ICD-10-CM

## 2024-02-28 DIAGNOSIS — E87.6 LOW SERUM POTASSIUM LEVEL: ICD-10-CM

## 2024-02-28 RX ORDER — ROSUVASTATIN CALCIUM 5 MG/1
TABLET, COATED ORAL
Qty: 90 TABLET | Refills: 1 | Status: SHIPPED | OUTPATIENT
Start: 2024-02-28

## 2024-02-28 RX ORDER — POTASSIUM CHLORIDE 1500 MG/1
20 TABLET, EXTENDED RELEASE ORAL DAILY
Qty: 90 TABLET | Refills: 1 | Status: SHIPPED | OUTPATIENT
Start: 2024-02-28

## 2024-03-07 ENCOUNTER — OFFICE VISIT (OUTPATIENT)
Dept: INTERNAL MEDICINE CLINIC | Facility: CLINIC | Age: 69
End: 2024-03-07
Payer: MEDICARE

## 2024-03-07 VITALS
BODY MASS INDEX: 31.79 KG/M2 | HEART RATE: 71 BPM | TEMPERATURE: 98 F | OXYGEN SATURATION: 98 % | SYSTOLIC BLOOD PRESSURE: 132 MMHG | WEIGHT: 203 LBS | DIASTOLIC BLOOD PRESSURE: 82 MMHG

## 2024-03-07 DIAGNOSIS — E55.9 VITAMIN D DEFICIENCY: ICD-10-CM

## 2024-03-07 DIAGNOSIS — Z15.01 MONOALLELIC MUTATION OF ATM GENE: ICD-10-CM

## 2024-03-07 DIAGNOSIS — Z15.09 MONOALLELIC MUTATION OF ATM GENE: ICD-10-CM

## 2024-03-07 DIAGNOSIS — I10 ESSENTIAL HYPERTENSION: Primary | ICD-10-CM

## 2024-03-07 DIAGNOSIS — D56.8: ICD-10-CM

## 2024-03-07 DIAGNOSIS — E78.00 ELEVATED LDL CHOLESTEROL LEVEL: ICD-10-CM

## 2024-03-07 DIAGNOSIS — K76.0 FATTY LIVER: ICD-10-CM

## 2024-03-07 DIAGNOSIS — Z15.89 MONOALLELIC MUTATION OF ATM GENE: ICD-10-CM

## 2024-03-07 PROCEDURE — 99214 OFFICE O/P EST MOD 30 MIN: CPT | Performed by: INTERNAL MEDICINE

## 2024-03-07 PROCEDURE — G2211 COMPLEX E/M VISIT ADD ON: HCPCS | Performed by: INTERNAL MEDICINE

## 2024-03-07 NOTE — PROGRESS NOTES
Name: Dom Malone Jr.      : 1955      MRN: [id number]  Encounter Provider: Dony Quick DO  Encounter Date: 3/7/2024   Encounter department: [facility] INTERNAL MEDICINE    Assessment & Plan     1. Essential hypertension  Comments:  BP doing well, c/w CCB/ARB/HCTZ and potassium supplement.  check K+ level again but non-fasting to make sure it is normal  Orders:  -     Potassium; Future  -     Comprehensive metabolic panel; Future; Expected date: 2024    2. Elevated LDL cholesterol level  Comments:  taking statin and no SE, c/w rx  Orders:  -     Comprehensive metabolic panel; Future; Expected date: 2024  -     Lipid Panel with Direct LDL reflex; Future; Expected date: 2024    3. Vitamin D deficiency  Comments:  taking vit D OTC, c/w rx    4. Monoallelic mutation of OSMANY gene  Comments:  s/p genetic counseling and seeing GI/urology for monitoring surveillance    5. Beta+ thalassemia, normal Hb A2, type 1, silent (HCC)  Comments:  CBC stable and he is s/p heme eval.  c/w monitoring every 6 mos  Orders:  -     CBC and differential; Future; Expected date: 2024    6. Fatty liver  Comments:  discussed treatment of this including aiming for at least a 7% weight reduction from baseline.        Depression Screening and Follow-up Plan: Patient was screened for depression during today's encounter. They screened negative with a PHQ-2 score of 0.        Subjective      HPI    Here for follow up, Joyb is taking his medications and completed BW prior to today's appt.  He saw liver specialist for moderate to severe fatty liver and we discussed treatments for this today(weight loss, regular exercise, low chol diet).  He checked his weight on his home scale this AM and it was 193 lbs.  He is not exercising regularly and wants to resume this.  He has monoallelic mutation of OSMANY gene and his brother was recently dx'd with stomach cancer.  Joby is getting EGD in coming weeks.  He was  fasting with his previous BW and had not been able to take his potassium supplement yet that morning.  He has no muscle spasms or cramping.  He has no other questions or concerns today and ROS is otherwise negative.    Review of Systems   Constitutional:  Negative for fever.   HENT:  Negative for congestion.    Eyes:  Negative for visual disturbance.   Respiratory:  Negative for shortness of breath.    Cardiovascular:  Negative for chest pain.   Gastrointestinal:  Negative for abdominal pain.   Endocrine: Negative for polyuria.   Genitourinary:  Negative for difficulty urinating.   Musculoskeletal:  Negative for gait problem.   Skin:  Negative for rash.   Allergic/Immunologic: Negative for immunocompromised state.   Neurological:  Negative for dizziness.   Psychiatric/Behavioral:  Negative for dysphoric mood.        Current Outpatient Medications on File Prior to Visit   Medication Sig    amLODIPine (NORVASC) 10 mg tablet TAKE 1 TABLET BY MOUTH EVERY DAY    Ascorbic Acid (VITAMIN C) 1000 MG tablet Take 2 tablets by mouth daily    B Complex Vitamins (VITAMIN B-COMPLEX 100 IJ) Take by mouth    BETA CAROTENE PO Take by mouth    CHROMIUM PO Take by mouth    coenzyme Q-10 100 MG capsule Take by mouth    Klor-Con M20 20 MEQ tablet TAKE 1 TABLET BY MOUTH EVERY DAY    Lactobacillus (ACIDOPHILUS PO) Take by mouth    Multiple Vitamin-Folic Acid TABS Take by mouth    olmesartan-hydrochlorothiazide (BENICAR HCT) 40-25 MG per tablet TAKE 1 TABLET BY MOUTH EVERY DAY    Omega-3 Fatty Acids (FISH OIL) 1,000 mg Take 2 tablets by mouth daily    rosuvastatin (CRESTOR) 5 mg tablet TAKE 1 TABLET BY MOUTH EVERY DAY    vitamin E, tocopherol, 400 units capsule Take 400 Units by mouth 2 (two) times a day       Objective     /82 (BP Location: Left arm, Patient Position: Sitting, Cuff Size: Standard)   Pulse 71   Temp 98 °F (36.7 °C)   Wt 92.1 kg (203 lb)   SpO2 98%   BMI 31.79 kg/m²     Physical Exam  Vitals reviewed.    Constitutional:       General: He is not in acute distress.  HENT:      Head: Normocephalic and atraumatic.      Right Ear: Tympanic membrane normal.      Left Ear: Tympanic membrane normal.      Mouth/Throat:      Mouth: Mucous membranes are moist.   Eyes:      Conjunctiva/sclera: Conjunctivae normal.   Cardiovascular:      Rate and Rhythm: Normal rate and regular rhythm.      Heart sounds: No murmur heard.  Pulmonary:      Effort: Pulmonary effort is normal.      Breath sounds: No wheezing or rales.   Abdominal:      General: Bowel sounds are normal.      Palpations: Abdomen is soft.      Tenderness: There is no abdominal tenderness.   Musculoskeletal:      Cervical back: Neck supple.      Right lower leg: No edema.      Left lower leg: No edema.   Neurological:      Mental Status: He is alert. Mental status is at baseline.   Psychiatric:         Mood and Affect: Mood normal.         Behavior: Behavior normal.       Results for orders placed or performed in visit on 02/22/24   Comprehensive metabolic panel   Result Value Ref Range    Sodium 142 135 - 147 mmol/L    Potassium 3.3 (L) 3.5 - 5.3 mmol/L    Chloride 105 96 - 108 mmol/L    CO2 29 21 - 32 mmol/L    ANION GAP 8 mmol/L    BUN 17 5 - 25 mg/dL    Creatinine 0.80 0.60 - 1.30 mg/dL    Glucose, Fasting 83 65 - 99 mg/dL    Calcium 9.3 8.4 - 10.2 mg/dL    AST 16 13 - 39 U/L    ALT 25 7 - 52 U/L    Alkaline Phosphatase 57 34 - 104 U/L    Total Protein 7.3 6.4 - 8.4 g/dL    Albumin 4.5 3.5 - 5.0 g/dL    Total Bilirubin 1.32 (H) 0.20 - 1.00 mg/dL    eGFR 89 ml/min/1.73sq m   Lipid Panel with Direct LDL reflex   Result Value Ref Range    Cholesterol 126 See Comment mg/dL    Triglycerides 120 See Comment mg/dL    HDL, Direct 42 >=40 mg/dL    LDL Calculated 60 0 - 100 mg/dL   CBC and differential   Result Value Ref Range    WBC 6.49 4.31 - 10.16 Thousand/uL    RBC 5.93 (H) 3.88 - 5.62 Million/uL    Hemoglobin 11.4 (L) 12.0 - 17.0 g/dL    Hematocrit 37.4 36.5 - 49.3 %     MCV 63 (L) 82 - 98 fL    MCH 19.2 (L) 26.8 - 34.3 pg    MCHC 30.5 (L) 31.4 - 37.4 g/dL    RDW 18.3 (H) 11.6 - 15.1 %    MPV 11.5 8.9 - 12.7 fL    Platelets 292 149 - 390 Thousands/uL    nRBC 0 /100 WBCs    Neutrophils Relative 73 43 - 75 %    Immat GRANS % 1 0 - 2 %    Lymphocytes Relative 13 (L) 14 - 44 %    Monocytes Relative 8 4 - 12 %    Eosinophils Relative 4 0 - 6 %    Basophils Relative 1 0 - 1 %    Neutrophils Absolute 4.81 1.85 - 7.62 Thousands/µL    Immature Grans Absolute 0.03 0.00 - 0.20 Thousand/uL    Lymphocytes Absolute 0.83 0.60 - 4.47 Thousands/µL    Monocytes Absolute 0.53 0.17 - 1.22 Thousand/µL    Eosinophils Absolute 0.25 0.00 - 0.61 Thousand/µL    Basophils Absolute 0.04 0.00 - 0.10 Thousands/µL         Dony Quick DO

## 2024-03-07 NOTE — PATIENT INSTRUCTIONS
Fasting blood work in August  Potassium level but not fasting with PSA  Return in 6 months or sooner if questions

## 2024-03-21 ENCOUNTER — ANESTHESIA (OUTPATIENT)
Dept: ANESTHESIOLOGY | Facility: HOSPITAL | Age: 69
End: 2024-03-21

## 2024-03-21 ENCOUNTER — ANESTHESIA EVENT (OUTPATIENT)
Dept: ANESTHESIOLOGY | Facility: HOSPITAL | Age: 69
End: 2024-03-21

## 2024-03-25 ENCOUNTER — APPOINTMENT (OUTPATIENT)
Dept: LAB | Facility: CLINIC | Age: 69
End: 2024-03-25
Payer: MEDICARE

## 2024-03-25 DIAGNOSIS — I10 ESSENTIAL HYPERTENSION: ICD-10-CM

## 2024-03-25 LAB
POTASSIUM SERPL-SCNC: 3.6 MMOL/L (ref 3.5–5.3)
PSA SERPL-MCNC: 4.42 NG/ML (ref 0–4)

## 2024-03-25 PROCEDURE — 84132 ASSAY OF SERUM POTASSIUM: CPT

## 2024-03-26 ENCOUNTER — TELEPHONE (OUTPATIENT)
Dept: GASTROENTEROLOGY | Facility: MEDICAL CENTER | Age: 69
End: 2024-03-26

## 2024-03-26 NOTE — TELEPHONE ENCOUNTER
Confirming Upcoming Procedure: EGD on 04/08/2024  Physician performing: Dr. Preston  Location of procedure:  [facility]  Prep: N/A

## 2024-04-05 RX ORDER — SODIUM CHLORIDE 9 MG/ML
125 INJECTION, SOLUTION INTRAVENOUS CONTINUOUS
Status: CANCELLED | OUTPATIENT
Start: 2024-04-05

## 2024-04-08 ENCOUNTER — ANESTHESIA EVENT (OUTPATIENT)
Dept: GASTROENTEROLOGY | Facility: MEDICAL CENTER | Age: 69
End: 2024-04-08

## 2024-04-08 ENCOUNTER — HOSPITAL ENCOUNTER (OUTPATIENT)
Dept: GASTROENTEROLOGY | Facility: MEDICAL CENTER | Age: 69
Setting detail: OUTPATIENT SURGERY
Discharge: HOME | End: 2024-04-08
Payer: MEDICARE

## 2024-04-08 ENCOUNTER — ANESTHESIA (OUTPATIENT)
Dept: GASTROENTEROLOGY | Facility: MEDICAL CENTER | Age: 69
End: 2024-04-08

## 2024-04-08 VITALS
TEMPERATURE: 97.8 F | RESPIRATION RATE: 18 BRPM | DIASTOLIC BLOOD PRESSURE: 72 MMHG | SYSTOLIC BLOOD PRESSURE: 145 MMHG | OXYGEN SATURATION: 98 % | HEART RATE: 65 BPM

## 2024-04-08 DIAGNOSIS — Z15.01 MONOALLELIC MUTATION OF ATM GENE: ICD-10-CM

## 2024-04-08 DIAGNOSIS — Z15.09 MONOALLELIC MUTATION OF ATM GENE: ICD-10-CM

## 2024-04-08 DIAGNOSIS — Z80.0 FAMILY HISTORY OF GASTRIC CANCER: ICD-10-CM

## 2024-04-08 DIAGNOSIS — Z15.89 MONOALLELIC MUTATION OF ATM GENE: ICD-10-CM

## 2024-04-08 PROCEDURE — 88305 TISSUE EXAM BY PATHOLOGIST: CPT | Performed by: PATHOLOGY

## 2024-04-08 RX ORDER — PROPOFOL 10 MG/ML
INJECTION, EMULSION INTRAVENOUS AS NEEDED
Status: DISCONTINUED | OUTPATIENT
Start: 2024-04-08 | End: 2024-04-08

## 2024-04-08 RX ORDER — LIDOCAINE HYDROCHLORIDE 20 MG/ML
INJECTION, SOLUTION EPIDURAL; INFILTRATION; INTRACAUDAL; PERINEURAL AS NEEDED
Status: DISCONTINUED | OUTPATIENT
Start: 2024-04-08 | End: 2024-04-08

## 2024-04-08 RX ORDER — SODIUM CHLORIDE 9 MG/ML
125 INJECTION, SOLUTION INTRAVENOUS CONTINUOUS
Status: DISCONTINUED | OUTPATIENT
Start: 2024-04-08 | End: 2024-04-12 | Stop reason: HOSPADM

## 2024-04-08 RX ADMIN — PROPOFOL 40 MG: 10 INJECTION, EMULSION INTRAVENOUS at 07:54

## 2024-04-08 RX ADMIN — LIDOCAINE HYDROCHLORIDE 100 MG: 20 INJECTION, SOLUTION EPIDURAL; INFILTRATION; INTRACAUDAL at 07:51

## 2024-04-08 RX ADMIN — PROPOFOL 170 MG: 10 INJECTION, EMULSION INTRAVENOUS at 07:51

## 2024-04-08 RX ADMIN — SODIUM CHLORIDE 125 ML/HR: 0.9 INJECTION, SOLUTION INTRAVENOUS at 07:32

## 2024-04-08 NOTE — H&P
History and Physical -  [facility]  Dom Malone Jr. 69 y.o. male MRN: [id number]                  HPI: Dom Malone Jr. is a 69 y.o. year old male who presents for family history of gastric cancer.      REVIEW OF SYSTEMS: Per the HPI, and otherwise unremarkable.    Historical Information   Past Medical History:   Diagnosis Date    Allergic 1998    Anemia 1975    Cervicalgia 10/29/2014    Colon polyp     Fatty liver     Hyperlipidemia     Hypertension 2006    Prostatic hypertrophy     Benign     Past Surgical History:   Procedure Laterality Date    COLONOSCOPY      SKIN TAG REMOVAL      VASECTOMY      Vas Deferens     Social History   Social History     Substance and Sexual Activity   Alcohol Use Yes    Alcohol/week: 5.0 standard drinks of alcohol    Types: 3 Glasses of wine, 2 Cans of beer per week    Comment: drinks wine     Social History     Substance and Sexual Activity   Drug Use No     Social History     Tobacco Use   Smoking Status Never   Smokeless Tobacco Never     Family History   Problem Relation Age of Onset    Thalassemia Mother     Heart Valve Disease Mother         heart valve replaced    Hypertension Father     Stomach cancer Father     Colon cancer Brother 50        mass in colon, suspected cancer-dx'd at 51 y/o    Valvular heart disease Brother         bicuspid aortic valve    Stomach cancer Brother     Alcohol abuse Neg Hx     Substance Abuse Neg Hx     Mental illness Neg Hx     Depression Neg Hx        Meds/Allergies       Current Outpatient Medications:     amLODIPine (NORVASC) 10 mg tablet    Ascorbic Acid (VITAMIN C) 1000 MG tablet    B Complex Vitamins (VITAMIN B-COMPLEX 100 IJ)    BETA CAROTENE PO    CHROMIUM PO    coenzyme Q-10 100 MG capsule    Klor-Con M20 20 MEQ tablet    Lactobacillus (ACIDOPHILUS PO)    Multiple Vitamin-Folic Acid TABS    olmesartan-hydrochlorothiazide (BENICAR HCT) 40-25 MG per tablet    Omega-3 Fatty Acids (FISH OIL) 1,000 mg     rosuvastatin (CRESTOR) 5 mg tablet    vitamin E, tocopherol, 400 units capsule    Current Facility-Administered Medications:     sodium chloride 0.9 % infusion, 125 mL/hr, Intravenous, Continuous    Allergies   Allergen Reactions    Albuterol Throat Swelling       Objective     BP (!) 181/87   Pulse 65   Temp 97.8 °F (36.6 °C) (Temporal)   Resp 18   SpO2 99%       PHYSICAL EXAM    Gen: NAD  Head: NCAT  CV: RRR  CHEST: Clear  ABD: soft, NT/ND  EXT: no edema      ASSESSMENT/PLAN:  This is a 69 y.o. year old male here for EGD, and he is stable and optimized for his procedure.

## 2024-04-08 NOTE — ANESTHESIA POSTPROCEDURE EVALUATION
Post-Op Assessment Note    CV Status:  Stable    Pain management: adequate       Mental Status:  Alert and awake   Hydration Status:  Euvolemic   PONV Controlled:  Controlled   Airway Patency:  Patent     Post Op Vitals Reviewed: Yes    No anethesia notable event occurred.    Staff: Anesthesiologist               BP      Temp      Pulse     Resp      SpO2      /72   Pulse 65   Temp 97.8 °F (36.6 °C) (Temporal)   Resp 18   SpO2 98%

## 2024-04-08 NOTE — ANESTHESIA PREPROCEDURE EVALUATION
Procedure:  EGD    Relevant Problems   CARDIO   (+) Essential hypertension      HEMATOLOGY   (+) Beta+ thalassemia, normal Hb A2, type 1, silent (HCC)     (+) HLD     Physical Exam    Airway    Mallampati score: II  TM Distance: >3 FB  Neck ROM: full     Dental       Cardiovascular  Rhythm: regular    Pulmonary   Breath sounds clear to auscultation    Other Findings        Anesthesia Plan  ASA Score- 2     Anesthesia Type- IV sedation with anesthesia with ASA Monitors.         Additional Monitors:     Airway Plan:            Plan Factors-Exercise tolerance (METS): >4 METS.    Chart reviewed.   Existing labs reviewed.     Patient is not a current smoker.              Induction- intravenous.    Postoperative Plan-     Informed Consent- Anesthetic plan and risks discussed with patient.

## 2024-04-09 ENCOUNTER — OFFICE VISIT (OUTPATIENT)
Dept: UROLOGY | Facility: CLINIC | Age: 69
End: 2024-04-09
Payer: MEDICARE

## 2024-04-09 VITALS
HEIGHT: 68 IN | DIASTOLIC BLOOD PRESSURE: 76 MMHG | WEIGHT: 195 LBS | OXYGEN SATURATION: 98 % | BODY MASS INDEX: 29.55 KG/M2 | RESPIRATION RATE: 14 BRPM | SYSTOLIC BLOOD PRESSURE: 142 MMHG | HEART RATE: 57 BPM

## 2024-04-09 DIAGNOSIS — R97.20 ELEVATED PSA: Primary | ICD-10-CM

## 2024-04-09 PROCEDURE — 99213 OFFICE O/P EST LOW 20 MIN: CPT | Performed by: PHYSICIAN ASSISTANT

## 2024-04-09 NOTE — PROGRESS NOTES
UROLOGY PROGRESS NOTE   Patient Identifiers: Dom Malone (MRN [id number])  Date of Service: 4/9/2024    Subjective:   69-year-old man history of elevated PSA.  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  His current PSA 4.42.  Prior PSA 4.26.  1 year ago his PSA was 4.0.  No family history of prostate cancer but his brother does have colon cancer.    Reason for visit: Elevated PSA follow-up    Objective:     VITALS:    There were no vitals filed for this visit.  AUA SYMPTOM SCORE      Flowsheet Row Most Recent Value   AUA SYMPTOM SCORE    How often have you had a sensation of not emptying your bladder completely after you finished urinating? 0 (P)    How often have you had to urinate again less than two hours after you finished urinating? 1 (P)    How often have you found you stopped and started again several times when you urinate? 1 (P)    How often have you found it difficult to postpone urination? 0 (P)    How often have you had a weak urinary stream? 1 (P)    How often have you had to push or strain to begin urination? 1 (P)    How many times did you most typically get up to urinate from the time you went to bed at night until the time you got up in the morning? 1 (P)    Quality of Life: If you were to spend the rest of your life with your urinary condition just the way it is now, how would you feel about that? 1 (P)    AUA SYMPTOM SCORE 5 (P)               LABS:  Lab Results   Component Value Date    HGB 11.4 (L) 02/22/2024    HCT 37.4 02/22/2024    WBC 6.49 02/22/2024     02/22/2024   ]    Lab Results   Component Value Date     01/30/2015    K 3.6 03/25/2024     02/22/2024    CO2 29 02/22/2024    BUN 17 02/22/2024    CREATININE 0.80 02/22/2024    CALCIUM 9.3 02/22/2024    GLUCOSE 86 01/30/2015   ]        INPATIENT MEDS:    Current Outpatient Medications:     amLODIPine (NORVASC) 10 mg tablet, TAKE 1 TABLET BY MOUTH EVERY DAY, Disp: 90 tablet, Rfl: 1     Ascorbic Acid (VITAMIN C) 1000 MG tablet, Take 2 tablets by mouth daily, Disp: , Rfl:     B Complex Vitamins (VITAMIN B-COMPLEX 100 IJ), Take by mouth, Disp: , Rfl:     BETA CAROTENE PO, Take by mouth, Disp: , Rfl:     CHROMIUM PO, Take by mouth, Disp: , Rfl:     coenzyme Q-10 100 MG capsule, Take by mouth, Disp: , Rfl:     Klor-Con M20 20 MEQ tablet, TAKE 1 TABLET BY MOUTH EVERY DAY, Disp: 90 tablet, Rfl: 1    Lactobacillus (ACIDOPHILUS PO), Take by mouth, Disp: , Rfl:     Multiple Vitamin-Folic Acid TABS, Take by mouth, Disp: , Rfl:     olmesartan-hydrochlorothiazide (BENICAR HCT) 40-25 MG per tablet, TAKE 1 TABLET BY MOUTH EVERY DAY, Disp: 90 tablet, Rfl: 1    Omega-3 Fatty Acids (FISH OIL) 1,000 mg, Take 2 tablets by mouth daily, Disp: , Rfl:     rosuvastatin (CRESTOR) 5 mg tablet, TAKE 1 TABLET BY MOUTH EVERY DAY, Disp: 90 tablet, Rfl: 1    vitamin E, tocopherol, 400 units capsule, Take 400 Units by mouth 2 (two) times a day, Disp: , Rfl:   No current facility-administered medications for this visit.    Facility-Administered Medications Ordered in Other Visits:     sodium chloride 0.9 % infusion, 125 mL/hr, Intravenous, Continuous, Malcolm Richmond MD, Stopped at 04/08/24 0757      Physical Exam:   There were no vitals taken for this visit.  GEN: no acute distress    RESP: breathing comfortably with no accessory muscle use    ABD: soft, non-tender, non-distended   INCISION:    EXT: no significant peripheral edema   (Male): Penis circumcised, phallus normal, meatus patent.  Testicles descended into scrotum bilaterally without masses nor tenderness.  No inguinal hernias bilaterally.  GEOVANNY: Prostate is enlarged at 45 grams. The prostate is not boggy. The prostate is not tender.  No nodules noted      RADIOLOGY:   IMPRESSION:     1. PI-RADSv2.1 Category 2 - Low (clinically significant cancer is unlikely to be present).      2. Calculated prostate volume of 49.3 cc.    Assessment:   1.  Elevated PSA    Plan:   -New  surveillance  -Follow-up in 6 months with PSA prior to visit  -  -

## 2024-04-10 PROCEDURE — 88305 TISSUE EXAM BY PATHOLOGIST: CPT | Performed by: PATHOLOGY

## 2024-04-12 DIAGNOSIS — I10 ESSENTIAL HYPERTENSION: ICD-10-CM

## 2024-04-14 RX ORDER — OLMESARTAN MEDOXOMIL AND HYDROCHLOROTHIAZIDE 40/25 40; 25 MG/1; MG/1
1 TABLET ORAL DAILY
Qty: 90 TABLET | Refills: 1 | Status: SHIPPED | OUTPATIENT
Start: 2024-04-14

## 2024-04-28 LAB
APOB+LDLR+PCSK9 GENE MUT ANL BLD/T: NOT DETECTED
BRCA1+BRCA2 DEL+DUP + FULL MUT ANL BLD/T: NOT DETECTED
MLH1+MSH2+MSH6+PMS2 GN DEL+DUP+FUL M: NOT DETECTED

## 2024-05-10 DIAGNOSIS — I10 ESSENTIAL HYPERTENSION: ICD-10-CM

## 2024-05-10 RX ORDER — AMLODIPINE BESYLATE 10 MG/1
TABLET ORAL
Qty: 90 TABLET | Refills: 1 | Status: SHIPPED | OUTPATIENT
Start: 2024-05-10

## 2024-08-19 ENCOUNTER — APPOINTMENT (OUTPATIENT)
Dept: LAB | Facility: CLINIC | Age: 69
End: 2024-08-19
Payer: MEDICARE

## 2024-08-19 DIAGNOSIS — E80.6 HYPERBILIRUBINEMIA: ICD-10-CM

## 2024-08-19 DIAGNOSIS — K76.0 NAFLD (NONALCOHOLIC FATTY LIVER DISEASE): ICD-10-CM

## 2024-08-19 DIAGNOSIS — R97.20 ELEVATED PSA: ICD-10-CM

## 2024-08-19 LAB
ALBUMIN SERPL BCG-MCNC: 4.5 G/DL (ref 3.5–5)
ALP SERPL-CCNC: 58 U/L (ref 34–104)
ALT SERPL W P-5'-P-CCNC: 21 U/L (ref 7–52)
AST SERPL W P-5'-P-CCNC: 18 U/L (ref 13–39)
BILIRUB DIRECT SERPL-MCNC: 0.12 MG/DL (ref 0–0.2)
BILIRUB SERPL-MCNC: 1.08 MG/DL (ref 0.2–1)
PROT SERPL-MCNC: 7.3 G/DL (ref 6.4–8.4)

## 2024-08-19 PROCEDURE — 80076 HEPATIC FUNCTION PANEL: CPT

## 2024-08-19 PROCEDURE — 36415 COLL VENOUS BLD VENIPUNCTURE: CPT

## 2024-08-22 ENCOUNTER — RA CDI HCC (OUTPATIENT)
Dept: OTHER | Facility: HOSPITAL | Age: 69
End: 2024-08-22

## 2024-08-26 ENCOUNTER — OFFICE VISIT (OUTPATIENT)
Dept: GASTROENTEROLOGY | Facility: CLINIC | Age: 69
End: 2024-08-26
Payer: MEDICARE

## 2024-08-26 VITALS
WEIGHT: 195 LBS | OXYGEN SATURATION: 95 % | SYSTOLIC BLOOD PRESSURE: 140 MMHG | HEIGHT: 68 IN | HEART RATE: 63 BPM | TEMPERATURE: 97.6 F | BODY MASS INDEX: 29.55 KG/M2 | DIASTOLIC BLOOD PRESSURE: 80 MMHG

## 2024-08-26 DIAGNOSIS — Z15.09 MONOALLELIC MUTATION OF ATM GENE: ICD-10-CM

## 2024-08-26 DIAGNOSIS — Z91.89 FRAMINGHAM CARDIAC RISK 10-20% IN NEXT 10 YEARS: ICD-10-CM

## 2024-08-26 DIAGNOSIS — I10 ESSENTIAL HYPERTENSION: ICD-10-CM

## 2024-08-26 DIAGNOSIS — Z12.11 SCREENING FOR COLON CANCER: ICD-10-CM

## 2024-08-26 DIAGNOSIS — E78.00 ELEVATED LDL CHOLESTEROL LEVEL: ICD-10-CM

## 2024-08-26 DIAGNOSIS — Z15.01 MONOALLELIC MUTATION OF ATM GENE: ICD-10-CM

## 2024-08-26 DIAGNOSIS — Z15.89 MONOALLELIC MUTATION OF ATM GENE: ICD-10-CM

## 2024-08-26 DIAGNOSIS — E87.6 LOW SERUM POTASSIUM LEVEL: ICD-10-CM

## 2024-08-26 DIAGNOSIS — E80.6 HYPERBILIRUBINEMIA: ICD-10-CM

## 2024-08-26 DIAGNOSIS — K76.0 METABOLIC DYSFUNCTION-ASSOCIATED STEATOTIC LIVER DISEASE (MASLD): Primary | ICD-10-CM

## 2024-08-26 PROCEDURE — 99213 OFFICE O/P EST LOW 20 MIN: CPT | Performed by: FAMILY MEDICINE

## 2024-08-26 RX ORDER — OLMESARTAN MEDOXOMIL AND HYDROCHLOROTHIAZIDE 40/25 40; 25 MG/1; MG/1
1 TABLET ORAL DAILY
Qty: 90 TABLET | Refills: 1 | Status: SHIPPED | OUTPATIENT
Start: 2024-08-26

## 2024-08-26 RX ORDER — POTASSIUM CHLORIDE 1500 MG/1
20 TABLET, EXTENDED RELEASE ORAL DAILY
Qty: 90 TABLET | Refills: 1 | Status: SHIPPED | OUTPATIENT
Start: 2024-08-26

## 2024-08-26 RX ORDER — ROSUVASTATIN CALCIUM 5 MG/1
TABLET, COATED ORAL
Qty: 90 TABLET | Refills: 1 | Status: SHIPPED | OUTPATIENT
Start: 2024-08-26

## 2024-08-26 NOTE — PROGRESS NOTES
[facility] - Outpatient Follow-up Note  Dom Malone Jr. 69 y.o. male MRN: [id number]  Encounter: [id number]          ASSESSMENT AND PLAN:      1. Metabolic dysfunction-associated steatotic liver disease (MASLD)  2. Hyperbilirubinemia  Patient with chronic but mild hyperbilirubinemia since 2016. He was also noted to have mildly elevated serum transaminases which have been well within normal limits since 2022 in the setting of weight loss. RUQ US was notable for hepatomegaly and marked fatty infiltration. No evidence of hepatobiliary obstruction.    He had a focused serologic evaluation which was negative for both viral hepatitis and hemochromatosis as well as with a normal A1AT phenotype. Therefore, suspected that his previously elevated transaminases were secondary to MASLD given that they normalized with weight loss. He also had a US elastography with F0-F1 fibrosis and S3 steatosis. He has lost an additional 4 lbs since his last appointment and congratulated his efforts!    Since his last appointment, he had a hepatic function panel in order to fractionate his bilirubin which was primarily indirect and c/w Gilbert's disease. He is aware that this is considered a benign inherited condition which may cause fluctuations in his bilirubin over time. Although most patient's with Gilbert's are asymptomatic, advised to monitor for signs/sxs's of elevated bilirubin including, but not limited to, yellowing of eyes/skin, dark/schuler colored urine, or intense and unexplained itching. We will continue to monitor his liver function tests on a yearly basis - primarily in the setting of MASLD - but no further workup is necessary for his hyperbilirubinemia.     Otherwise, he will continue to work towards steady and sustainable weight loss, optimization of his metabolic risk factors and limiting his alcohol use. We will also plan for a repeat US elastography 2 years from his last in  order to surveil for advanced hepatic fibrosis; Next due 11/2025.     - CBC and differential; Future  - Comprehensive metabolic panel; Future  - Protime-INR; Future    3. Monoallelic mutation of OSMANY gene  Patient with an OSMANY gene mutation as well as 2 first-degree relatives with gastric cancer including both his father and brother. He was seen by gastroenterology and recently underwent an EGD for gastric cancer screening which was normal and was unremarkable biopsies. It was recommended he consider repeat EGD x 2-5 years for continued gastric cancer screening.    Of note, it was also noted that current guidelines recommend pancreatic screening only in patients with OSMANY gene mutations and a family history of pancreatic cancer which he does not have.  He will inform us if there is a change regarding his family history.     4. Screening for colon cancer  Patient is up-to-date with CRC screening. Next due in 5/2026.       Follow-up in 1 year or sooner if necessary.    ______________________________________________________________________    SUBJECTIVE: Patient is a 69 y.o. male with PMH significant for obesity, prediabetes, HTN, HLD and a monoallelic mutation of OSMANY gene who presents today for follow-up regarding    Interval history  - Hepatic function panel with mild hyperbilirubinemia (primarily indirect).   - EGD (4/8/2024) was unremarkable. Biopsies were also unremarkable. It was recommended he consider repeat EGD x 3-5 years for gastric cancer screening in the setting of OSMANY gene mutation.  - He has lost 4 lbs since his last appointment.    Extended liver history  oJby was incidentally found to have mildly elevated hyperbilirubinemia on routine labs. He previously had abnormal liver tests dating back to 2016 with peak elevation in serum ALT to 80. Most recent labs showed normalization of serum transaminases in August 2023 after weight loss. His liver synthetic function and platelets are otherwise normal. He had an  RUQ US which showed hepatomegaly with hepatic steatosis. Admits to social EtOH use.     2/26/2024 - Focused serologic workup was negative for viral hepatitis and hemochromatosis. Also with a normal A1AT phenotype. Hemoglobin A1c was noted to be in the prediabetic range. U/S elastography with F0-F1 fibrosis and S3 steatosis.      REVIEW OF SYSTEMS IS OTHERWISE NEGATIVE.      Historical Information   Past Medical History:   Diagnosis Date   • Allergic 1998   • Anemia 1975   • Cervicalgia 10/29/2014   • Colon polyp    • Fatty liver    • Hyperlipidemia    • Hypertension 2006   • Prostatic hypertrophy     Benign     Past Surgical History:   Procedure Laterality Date   • COLONOSCOPY     • SKIN TAG REMOVAL     • VASECTOMY      Vas Deferens     Social History   Social History     Substance and Sexual Activity   Alcohol Use Yes   • Alcohol/week: 5.0 standard drinks of alcohol   • Types: 3 Glasses of wine, 2 Cans of beer per week    Comment: drinks wine     Social History     Substance and Sexual Activity   Drug Use No     Social History     Tobacco Use   Smoking Status Never   Smokeless Tobacco Never     Family History   Problem Relation Age of Onset   • Thalassemia Mother    • Heart Valve Disease Mother         heart valve replaced   • Hypertension Father    • Stomach cancer Father    • Colon cancer Brother 50        mass in colon, suspected cancer-dx'd at 51 y/o   • Valvular heart disease Brother         bicuspid aortic valve   • Stomach cancer Brother    • Alcohol abuse Neg Hx    • Substance Abuse Neg Hx    • Mental illness Neg Hx    • Depression Neg Hx        Meds/Allergies       Current Outpatient Medications:   •  amLODIPine (NORVASC) 10 mg tablet  •  Ascorbic Acid (VITAMIN C) 1000 MG tablet  •  B Complex Vitamins (VITAMIN B-COMPLEX 100 IJ)  •  BETA CAROTENE PO  •  CHROMIUM PO  •  coenzyme Q-10 100 MG capsule  •  Lactobacillus (ACIDOPHILUS PO)  •  Multiple Vitamin-Folic Acid TABS  •  Omega-3 Fatty Acids (FISH OIL)  "1,000 mg  •  vitamin E, tocopherol, 400 units capsule  •  Klor-Con M20 20 MEQ tablet  •  olmesartan-hydrochlorothiazide (BENICAR HCT) 40-25 MG per tablet  •  rosuvastatin (CRESTOR) 5 mg tablet    Allergies   Allergen Reactions   • Albuterol Throat Swelling           Objective     Blood pressure 140/80, pulse 63, temperature 97.6 °F (36.4 °C), temperature source Tympanic, height 5' 8\" (1.727 m), weight 88.5 kg (195 lb), SpO2 95%. Body mass index is 29.65 kg/m².      PHYSICAL EXAM:      General Appearance:   Alert, cooperative, no distress   HEENT:   Normocephalic, atraumatic, anicteric.     Neck:  Supple, symmetrical, trachea midline   Lungs:   Clear to auscultation bilaterally; no rales, rhonchi or wheezing; respirations unlabored    Heart::   Regular rate and rhythm; no murmur, rub, or gallop.   Abdomen:   Soft, non-tender, non-distended; normal bowel sounds; no masses, no organomegaly    Genitalia:   Deferred    Rectal:   Deferred    Extremities:  No cyanosis, clubbing or edema    Pulses:  2+ and symmetric    Skin:  No jaundice, rashes, or lesions    Lymph nodes:  No palpable cervical lymphadenopathy        Lab Results:   No visits with results within 1 Day(s) from this visit.   Latest known visit with results is:   Appointment on 08/19/2024   Component Date Value   • Total Bilirubin 08/19/2024 1.08 (H)    • Bilirubin, Direct 08/19/2024 0.12    • Alkaline Phosphatase 08/19/2024 58    • AST 08/19/2024 18    • ALT 08/19/2024 21    • Total Protein 08/19/2024 7.3    • Albumin 08/19/2024 4.5          Radiology Results:   No results found.    Michelle Urbina PA-C     **Please note: Dictation voice to text software may have been used in the creation of this record. Occasional wrong word or “sound alike” substitutions may have occurred due to the inherent limitations of voice recognition software. Read the chart carefully and recognize, using context, where substitutions have occurred.**  "

## 2024-08-30 ENCOUNTER — TELEPHONE (OUTPATIENT)
Age: 69
End: 2024-08-30

## 2024-08-30 DIAGNOSIS — R73.01 IMPAIRED FASTING GLUCOSE: Primary | ICD-10-CM

## 2024-08-30 NOTE — TELEPHONE ENCOUNTER
Patient would like to add A1C to his lab order before he come in for his appointment. Please assist the patient with adding his A1C blood work order. Thank you.

## 2024-09-03 ENCOUNTER — APPOINTMENT (OUTPATIENT)
Dept: LAB | Facility: CLINIC | Age: 69
End: 2024-09-03
Payer: MEDICARE

## 2024-09-03 DIAGNOSIS — R73.01 IMPAIRED FASTING GLUCOSE: ICD-10-CM

## 2024-09-03 DIAGNOSIS — D56.8: ICD-10-CM

## 2024-09-03 DIAGNOSIS — I10 ESSENTIAL HYPERTENSION: ICD-10-CM

## 2024-09-03 DIAGNOSIS — E78.00 ELEVATED LDL CHOLESTEROL LEVEL: ICD-10-CM

## 2024-09-03 LAB
ALBUMIN SERPL BCG-MCNC: 4.4 G/DL (ref 3.5–5)
ALP SERPL-CCNC: 56 U/L (ref 34–104)
ALT SERPL W P-5'-P-CCNC: 28 U/L (ref 7–52)
ANION GAP SERPL CALCULATED.3IONS-SCNC: 7 MMOL/L (ref 4–13)
AST SERPL W P-5'-P-CCNC: 18 U/L (ref 13–39)
BASOPHILS # BLD AUTO: 0.05 THOUSANDS/ÂΜL (ref 0–0.1)
BASOPHILS NFR BLD AUTO: 1 % (ref 0–1)
BILIRUB SERPL-MCNC: 1.29 MG/DL (ref 0.2–1)
BUN SERPL-MCNC: 15 MG/DL (ref 5–25)
CALCIUM SERPL-MCNC: 9.1 MG/DL (ref 8.4–10.2)
CHLORIDE SERPL-SCNC: 104 MMOL/L (ref 96–108)
CHOLEST SERPL-MCNC: 132 MG/DL
CO2 SERPL-SCNC: 31 MMOL/L (ref 21–32)
CREAT SERPL-MCNC: 0.92 MG/DL (ref 0.6–1.3)
EOSINOPHIL # BLD AUTO: 0.44 THOUSAND/ÂΜL (ref 0–0.61)
EOSINOPHIL NFR BLD AUTO: 7 % (ref 0–6)
ERYTHROCYTE [DISTWIDTH] IN BLOOD BY AUTOMATED COUNT: 18.5 % (ref 11.6–15.1)
EST. AVERAGE GLUCOSE BLD GHB EST-MCNC: 111 MG/DL
GFR SERPL CREATININE-BSD FRML MDRD: 83 ML/MIN/1.73SQ M
GLUCOSE P FAST SERPL-MCNC: 87 MG/DL (ref 65–99)
HBA1C MFR BLD: 5.5 %
HCT VFR BLD AUTO: 36.3 % (ref 36.5–49.3)
HDLC SERPL-MCNC: 40 MG/DL
HGB BLD-MCNC: 11.1 G/DL (ref 12–17)
IMM GRANULOCYTES # BLD AUTO: 0.03 THOUSAND/UL (ref 0–0.2)
IMM GRANULOCYTES NFR BLD AUTO: 1 % (ref 0–2)
LDLC SERPL CALC-MCNC: 67 MG/DL (ref 0–100)
LYMPHOCYTES # BLD AUTO: 0.83 THOUSANDS/ÂΜL (ref 0.6–4.47)
LYMPHOCYTES NFR BLD AUTO: 13 % (ref 14–44)
MCH RBC QN AUTO: 19.2 PG (ref 26.8–34.3)
MCHC RBC AUTO-ENTMCNC: 30.6 G/DL (ref 31.4–37.4)
MCV RBC AUTO: 63 FL (ref 82–98)
MONOCYTES # BLD AUTO: 0.59 THOUSAND/ÂΜL (ref 0.17–1.22)
MONOCYTES NFR BLD AUTO: 10 % (ref 4–12)
NEUTROPHILS # BLD AUTO: 4.27 THOUSANDS/ÂΜL (ref 1.85–7.62)
NEUTS SEG NFR BLD AUTO: 68 % (ref 43–75)
NRBC BLD AUTO-RTO: 0 /100 WBCS
PLATELET # BLD AUTO: 280 THOUSANDS/UL (ref 149–390)
PMV BLD AUTO: 10.2 FL (ref 8.9–12.7)
POTASSIUM SERPL-SCNC: 3.4 MMOL/L (ref 3.5–5.3)
PROT SERPL-MCNC: 7.2 G/DL (ref 6.4–8.4)
RBC # BLD AUTO: 5.79 MILLION/UL (ref 3.88–5.62)
SODIUM SERPL-SCNC: 142 MMOL/L (ref 135–147)
TRIGL SERPL-MCNC: 124 MG/DL
WBC # BLD AUTO: 6.21 THOUSAND/UL (ref 4.31–10.16)

## 2024-09-03 PROCEDURE — 80061 LIPID PANEL: CPT

## 2024-09-03 PROCEDURE — 36415 COLL VENOUS BLD VENIPUNCTURE: CPT

## 2024-09-03 PROCEDURE — 85025 COMPLETE CBC W/AUTO DIFF WBC: CPT

## 2024-09-03 PROCEDURE — 83036 HEMOGLOBIN GLYCOSYLATED A1C: CPT

## 2024-09-03 PROCEDURE — 80053 COMPREHEN METABOLIC PANEL: CPT

## 2024-09-11 ENCOUNTER — OFFICE VISIT (OUTPATIENT)
Dept: FAMILY MEDICINE CLINIC | Facility: CLINIC | Age: 69
End: 2024-09-11
Payer: MEDICARE

## 2024-09-11 VITALS
TEMPERATURE: 98 F | HEART RATE: 70 BPM | DIASTOLIC BLOOD PRESSURE: 71 MMHG | BODY MASS INDEX: 29.5 KG/M2 | SYSTOLIC BLOOD PRESSURE: 134 MMHG | WEIGHT: 194 LBS | OXYGEN SATURATION: 98 %

## 2024-09-11 DIAGNOSIS — Z00.00 ENCOUNTER FOR SUBSEQUENT ANNUAL WELLNESS VISIT (AWV) IN MEDICARE PATIENT: Primary | ICD-10-CM

## 2024-09-11 DIAGNOSIS — Z15.01 MONOALLELIC MUTATION OF ATM GENE: ICD-10-CM

## 2024-09-11 DIAGNOSIS — R71.8 LOW MEAN CORPUSCULAR VOLUME (MCV): ICD-10-CM

## 2024-09-11 DIAGNOSIS — Z15.09 MONOALLELIC MUTATION OF ATM GENE: ICD-10-CM

## 2024-09-11 DIAGNOSIS — Z15.89 MONOALLELIC MUTATION OF ATM GENE: ICD-10-CM

## 2024-09-11 DIAGNOSIS — I10 ESSENTIAL HYPERTENSION: ICD-10-CM

## 2024-09-11 PROBLEM — K76.0 FATTY LIVER: Status: ACTIVE | Noted: 2024-09-11

## 2024-09-11 PROCEDURE — G0439 PPPS, SUBSEQ VISIT: HCPCS | Performed by: FAMILY MEDICINE

## 2024-09-11 NOTE — ASSESSMENT & PLAN NOTE
- Patient at increased risk of breast, prostate, pancreatic, colorectal and possible hematologic forms of cancer  - Followed by Urology regularly; PSA elevated but stable, no urinary changes   - Last colonoscopy 1 year ago with recommendation in 3-5 years given OSMANY mutation; noted incarcerated hemorrhoids only  - Recent blood work unremarkable given underlying disease processes    Plan  - Consider Iron panel follow up to r/o underlying iron deficiency on top of beta thalassemia given patients microcytic anemia (MCV ranges as low as 55 are possibly expected however in a patient with beta thalassemia minor)  - continue routine lab monitoring for any blood dyscrasias

## 2024-09-11 NOTE — PATIENT INSTRUCTIONS
Medicare Preventive Visit Patient Instructions  Thank you for completing your Welcome to Medicare Visit or Medicare Annual Wellness Visit today. Your next wellness visit will be due in one year (9/12/2025).  The screening/preventive services that you may require over the next 5-10 years are detailed below. Some tests may not apply to you based off risk factors and/or age. Screening tests ordered at today's visit but not completed yet may show as past due. Also, please note that scanned in results may not display below.  Preventive Screenings:  Service Recommendations Previous Testing/Comments   Colorectal Cancer Screening  Colonoscopy    Fecal Occult Blood Test (FOBT)/Fecal Immunochemical Test (FIT)  Fecal DNA/Cologuard Test  Flexible Sigmoidoscopy Age: 45-75 years old   Colonoscopy: every 10 years (May be performed more frequently if at higher risk)  OR  FOBT/FIT: every 1 year  OR  Cologuard: every 3 years  OR  Sigmoidoscopy: every 5 years  Screening may be recommended earlier than age 45 if at higher risk for colorectal cancer. Also, an individualized decision between you and your healthcare provider will decide whether screening between the ages of 76-85 would be appropriate. Colonoscopy: 05/26/2023  FOBT/FIT: Not on file  Cologuard: Not on file  Sigmoidoscopy: Not on file    Screening Current     Prostate Cancer Screening Individualized decision between patient and health care provider in men between ages of 55-69   Medicare will cover every 12 months beginning on the day after your 50th birthday PSA: 4.42 ng/mL     Screening Current     Hepatitis C Screening Once for adults born between 1945 and 1965  More frequently in patients at high risk for Hepatitis C Hep C Antibody: 11/02/2023    Screening Current   Diabetes Screening 1-2 times per year if you're at risk for diabetes or have pre-diabetes Fasting glucose: 87 mg/dL (9/3/2024)  A1C: 5.5 % (9/3/2024)  Screening Current   Cholesterol Screening Once every 5  years if you don't have a lipid disorder. May order more often based on risk factors. Lipid panel: 09/03/2024  Screening Current      Other Preventive Screenings Covered by Medicare:  Abdominal Aortic Aneurysm (AAA) Screening: covered once if your at risk. You're considered to be at risk if you have a family history of AAA or a male between the age of 65-75 who smoking at least 100 cigarettes in your lifetime.  Lung Cancer Screening: covers low dose CT scan once per year if you meet all of the following conditions: (1) Age 55-77; (2) No signs or symptoms of lung cancer; (3) Current smoker or have quit smoking within the last 15 years; (4) You have a tobacco smoking history of at least 20 pack years (packs per day x number of years you smoked); (5) You get a written order from a healthcare provider.  Glaucoma Screening: covered annually if you're considered high risk: (1) You have diabetes OR (2) Family history of glaucoma OR (3)  aged 50 and older OR (4)  American aged 65 and older  Osteoporosis Screening: covered every 2 years if you meet one of the following conditions: (1) Have a vertebral abnormality; (2) On glucocorticoid therapy for more than 3 months; (3) Have primary hyperparathyroidism; (4) On osteoporosis medications and need to assess response to drug therapy.  HIV Screening: covered annually if you're between the age of 15-65. Also covered annually if you are younger than 15 and older than 65 with risk factors for HIV infection. For pregnant patients, it is covered up to 3 times per pregnancy.    Immunizations:  Immunization Recommendations   Influenza Vaccine Annual influenza vaccination during flu season is recommended for all persons aged >= 6 months who do not have contraindications   Pneumococcal Vaccine   * Pneumococcal conjugate vaccine = PCV13 (Prevnar 13), PCV15 (Vaxneuvance), PCV20 (Prevnar 20)  * Pneumococcal polysaccharide vaccine = PPSV23 (Pneumovax) Adults 19-65 yo  with certain risk factors or if 65+ yo  If never received any pneumonia vaccine: recommend Prevnar 20 (PCV20)  Give PCV20 if previously received 1 dose of PCV13 or PPSV23   Hepatitis B Vaccine 3 dose series if at intermediate or high risk (ex: diabetes, end stage renal disease, liver disease)   Respiratory syncytial virus (RSV) Vaccine - COVERED BY MEDICARE PART D  * RSVPreF3 (Arexvy) CDC recommends that adults 60 years of age and older may receive a single dose of RSV vaccine using shared clinical decision-making (SCDM)   Tetanus (Td) Vaccine - COST NOT COVERED BY MEDICARE PART B Following completion of primary series, a booster dose should be given every 10 years to maintain immunity against tetanus. Td may also be given as tetanus wound prophylaxis.   Tdap Vaccine - COST NOT COVERED BY MEDICARE PART B Recommended at least once for all adults. For pregnant patients, recommended with each pregnancy.   Shingles Vaccine (Shingrix) - COST NOT COVERED BY MEDICARE PART B  2 shot series recommended in those 19 years and older who have or will have weakened immune systems or those 50 years and older     Health Maintenance Due:      Topic Date Due   • Colorectal Cancer Screening  05/25/2026   • Hepatitis C Screening  Completed     Immunizations Due:      Topic Date Due   • Influenza Vaccine (1) 09/01/2024     Advance Directives   What are advance directives?  Advance directives are legal documents that state your wishes and plans for medical care. These plans are made ahead of time in case you lose your ability to make decisions for yourself. Advance directives can apply to any medical decision, such as the treatments you want, and if you want to donate organs.   What are the types of advance directives?  There are many types of advance directives, and each state has rules about how to use them. You may choose a combination of any of the following:  Living will:  This is a written record of the treatment you want. You can  also choose which treatments you do not want, which to limit, and which to stop at a certain time. This includes surgery, medicine, IV fluid, and tube feedings.   Durable power of  for healthcare (DPAHC):  This is a written record that states who you want to make healthcare choices for you when you are unable to make them for yourself. This person, called a proxy, is usually a family member or a friend. You may choose more than 1 proxy.  Do not resuscitate (DNR) order:  A DNR order is used in case your heart stops beating or you stop breathing. It is a request not to have certain forms of treatment, such as CPR. A DNR order may be included in other types of advance directives.  Medical directive:  This covers the care that you want if you are in a coma, near death, or unable to make decisions for yourself. You can list the treatments you want for each condition. Treatment may include pain medicine, surgery, blood transfusions, dialysis, IV or tube feedings, and a ventilator (breathing machine).  Values history:  This document has questions about your views, beliefs, and how you feel and think about life. This information can help others choose the care that you would choose.  Why are advance directives important?  An advance directive helps you control your care. Although spoken wishes may be used, it is better to have your wishes written down. Spoken wishes can be misunderstood, or not followed. Treatments may be given even if you do not want them. An advance directive may make it easier for your family to make difficult choices about your care.   Weight Management   Why it is important to manage your weight:  Being overweight increases your risk of health conditions such as heart disease, high blood pressure, type 2 diabetes, and certain types of cancer. It can also increase your risk for osteoarthritis, sleep apnea, and other respiratory problems. Aim for a slow, steady weight loss. Even a small amount of  weight loss can lower your risk of health problems.  How to lose weight safely:  A safe and healthy way to lose weight is to eat fewer calories and get regular exercise. You can lose up about 1 pound a week by decreasing the number of calories you eat by 500 calories each day.   Healthy meal plan for weight management:  A healthy meal plan includes a variety of foods, contains fewer calories, and helps you stay healthy. A healthy meal plan includes the following:  Eat whole-grain foods more often.  A healthy meal plan should contain fiber. Fiber is the part of grains, fruits, and vegetables that is not broken down by your body. Whole-grain foods are healthy and provide extra fiber in your diet. Some examples of whole-grain foods are whole-wheat breads and pastas, oatmeal, brown rice, and bulgur.  Eat a variety of vegetables every day.  Include dark, leafy greens such as spinach, kale, chris greens, and mustard greens. Eat yellow and orange vegetables such as carrots, sweet potatoes, and winter squash.   Eat a variety of fruits every day.  Choose fresh or canned fruit (canned in its own juice or light syrup) instead of juice. Fruit juice has very little or no fiber.  Eat low-fat dairy foods.  Drink fat-free (skim) milk or 1% milk. Eat fat-free yogurt and low-fat cottage cheese. Try low-fat cheeses such as mozzarella and other reduced-fat cheeses.  Choose meat and other protein foods that are low in fat.  Choose beans or other legumes such as split peas or lentils. Choose fish, skinless poultry (chicken or turkey), or lean cuts of red meat (beef or pork). Before you cook meat or poultry, cut off any visible fat.   Use less fat and oil.  Try baking foods instead of frying them. Add less fat, such as margarine, sour cream, regular salad dressing and mayonnaise to foods. Eat fewer high-fat foods. Some examples of high-fat foods include french fries, doughnuts, ice cream, and cakes.  Eat fewer sweets.  Limit foods and  drinks that are high in sugar. This includes candy, cookies, regular soda, and sweetened drinks.  Exercise:  Exercise at least 30 minutes per day on most days of the week. Some examples of exercise include walking, biking, dancing, and swimming. You can also fit in more physical activity by taking the stairs instead of the elevator or parking farther away from stores. Ask your healthcare provider about the best exercise plan for you.      © Copyright [employer] 2018 Information is for End User's use only and may not be sold, redistributed or otherwise used for commercial purposes. All illustrations and images included in CareNotes® are the copyrighted property of [employer] or "[employer]

## 2024-09-11 NOTE — PROGRESS NOTES
Ambulatory Visit  Name: Dom Malone Jr.      : 1955      MRN: [id number]  Encounter Provider: Mustapha Landeros DO  Encounter Date: 2024   Encounter department: [facility]    Assessment & Plan  Encounter for subsequent annual wellness visit (AWV) in Medicare patient         Monoallelic mutation of OSMANY gene  - Patient at increased risk of breast, prostate, pancreatic, colorectal and possible hematologic forms of cancer  - Followed by Urology regularly; PSA elevated but stable, no urinary changes   - Last colonoscopy 1 year ago with recommendation in 3-5 years given OSMANY mutation; noted incarcerated hemorrhoids only  - Recent blood work unremarkable given underlying disease processes    Plan  - Consider Iron panel follow up to r/o underlying iron deficiency on top of beta thalassemia given patients microcytic anemia (MCV ranges as low as 55 are possibly expected however in a patient with beta thalassemia minor)  - continue routine lab monitoring for any blood dyscrasias        Essential hypertension  - BP of 134/71 in office today; goal of <140/80 given age   - Patient currently on amlodipine 10 mg QD  - Patient tolerating medication without any adverse side effects  - Reports BP typically being well controlled    Plan  - Encourage patient to keep a BP log for personal review and to be brought in at follow up visits for review  - Encourage lifestyle changes with increased exercise and fiber intake  - Continue to monitor          Low mean corpuscular volume (MCV) likely due to beta thalassemia    Orders:    Iron Panel (Includes Ferritin, Iron Sat%, Iron, and TIBC); Future      Depression Screening and Follow-up Plan: Patient was screened for depression during today's encounter. They screened negative with a PHQ-2 score of 0.      Preventive health issues were discussed with patient, and age appropriate screening tests were ordered as noted in patient's After Visit  Summary. Personalized health advice and appropriate referrals for health education or preventive services given if needed, as noted in patient's After Visit Summary.    History of Present Illness     69 yoM PMHx of HTN, fatty liver, beta thalassemia, OSMANY gene mutation presents for AWV without any acute complaints or concerns. Patient reports no recent illness or change in health. Continues on current medications without any known side effects. Reports feeling well overall.        Patient Care Team:  Mustapha Landeros DO as PCP - General (Family Medicine)  Meghan Sanchez MD    Review of Systems   Constitutional:  Negative for chills, fatigue and fever.   HENT:  Negative for ear pain and sore throat.    Eyes:  Negative for pain and visual disturbance.   Respiratory:  Negative for cough and shortness of breath.    Cardiovascular:  Negative for chest pain and palpitations.   Gastrointestinal:  Negative for abdominal pain and vomiting.   Genitourinary:  Negative for decreased urine volume, difficulty urinating, dysuria, hematuria and urgency.   Musculoskeletal:  Negative for arthralgias and back pain.   Skin:  Negative for color change and rash.   Neurological:  Negative for dizziness, light-headedness and headaches.   All other systems reviewed and are negative.    Medical History Reviewed by provider this encounter:  Tobacco  Allergies  Meds  Problems  Med Hx  Surg Hx  Fam Hx       Annual Wellness Visit Questionnaire       Health Risk Assessment:   Patient rates overall health as excellent. Patient feels that their physical health rating is same. Patient is very satisfied with their life. Eyesight was rated as same. Hearing was rated as same. Patient feels that their emotional and mental health rating is same. Patients states they are never, rarely angry. Patient states they are never, rarely unusually tired/fatigued. Pain experienced in the last 7 days has been some. Patient's pain rating has been  1/10. Patient states that he has experienced no weight loss or gain in last 6 months.     Depression Screening:   PHQ-2 Score: 0      Fall Risk Screening:   In the past year, patient has experienced: no history of falling in past year      Home Safety:  Patient does not have trouble with stairs inside or outside of their home. Patient has working smoke alarms and has no working carbon monoxide detector. Home safety hazards include: none.     Nutrition:   Current diet is Regular, Low Saturated Fat and Low Carb.     Medications:   Patient is currently taking over-the-counter supplements. OTC medications include: see medication list. Patient is able to manage medications.     Activities of Daily Living (ADLs)/Instrumental Activities of Daily Living (IADLs):   Walk and transfer into and out of bed and chair?: Yes  Dress and groom yourself?: Yes    Bathe or shower yourself?: Yes    Feed yourself? Yes  Do your laundry/housekeeping?: Yes  Manage your money, pay your bills and track your expenses?: Yes  Make your own meals?: Yes    Do your own shopping?: Yes    Previous Hospitalizations:   Any hospitalizations or ED visits within the last 12 months?: No      Advance Care Planning:   Living will: No    Durable POA for healthcare: No    Advanced directive: No      PREVENTIVE SCREENINGS      Cardiovascular Screening:    General: Screening Current      Diabetes Screening:     General: Screening Current      Colorectal Cancer Screening:     General: Screening Current      Prostate Cancer Screening:    General: Screening Current      Abdominal Aortic Aneurysm (AAA) Screening:    Risk factors include: age between 65-76 yo        Lung Cancer Screening:     General: Screening Not Indicated      Hepatitis C Screening:    General: Screening Current    Screening, Brief Intervention, and Referral to Treatment (SBIRT)    Screening  Typical number of drinks in a day: 0  Typical number of drinks in a week: 2  Interpretation: Low risk  drinking behavior.    AUDIT-C Screenin) How often did you have a drink containing alcohol in the past year? 2 to 4 times a month  2) How many drinks did you have on a typical day when you were drinking in the past year? 0  3) How often did you have 6 or more drinks on one occasion in the past year? never    AUDIT-C Score: 2  Interpretation: Score 0-3 (male): Negative screen for alcohol misuse    Single Item Drug Screening:  How often have you used an illegal drug (including marijuana) or a prescription medication for non-medical reasons in the past year? never    Single Item Drug Screen Score: 0  Interpretation: Negative screen for possible drug use disorder    Social Determinants of Health     Financial Resource Strain: Low Risk  (2023)    Overall Financial Resource Strain (CARDIA)     Difficulty of Paying Living Expenses: Not hard at all   Food Insecurity: No Food Insecurity (9/10/2024)    Hunger Vital Sign     Worried About Running Out of Food in the Last Year: Never true     Ran Out of Food in the Last Year: Never true   Transportation Needs: No Transportation Needs (9/10/2024)    PRAPARE - Transportation     Lack of Transportation (Medical): No     Lack of Transportation (Non-Medical): No   Housing Stability: Low Risk  (9/10/2024)    Housing Stability Vital Sign     Unable to Pay for Housing in the Last Year: No     Number of Times Moved in the Last Year: 0     Homeless in the Last Year: No   Utilities: Not At Risk (9/10/2024)    [facility] Utilities     Threatened with loss of utilities: No       Objective     /71 (BP Location: Left arm, Patient Position: Sitting, Cuff Size: Standard)   Pulse 70   Temp 98 °F (36.7 °C)   Wt 88 kg (194 lb)   SpO2 98%   BMI 29.50 kg/m²     Physical Exam  Vitals and nursing note reviewed.   Constitutional:       General: He is not in acute distress.     Appearance: Normal appearance. He is well-developed. He is not ill-appearing.   HENT:      Head: Normocephalic and  atraumatic.      Right Ear: External ear normal.      Left Ear: External ear normal.      Nose: Nose normal.   Eyes:      Extraocular Movements: Extraocular movements intact.      Conjunctiva/sclera: Conjunctivae normal.   Cardiovascular:      Rate and Rhythm: Normal rate and regular rhythm.      Heart sounds: No murmur heard.  Pulmonary:      Effort: Pulmonary effort is normal. No respiratory distress.      Breath sounds: Normal breath sounds.   Abdominal:      Palpations: Abdomen is soft.      Tenderness: There is no abdominal tenderness.   Musculoskeletal:         General: No swelling.      Cervical back: Neck supple.      Right lower leg: No edema.      Left lower leg: No edema.   Skin:     General: Skin is warm and dry.      Capillary Refill: Capillary refill takes less than 2 seconds.   Neurological:      Mental Status: He is alert and oriented to person, place, and time.   Psychiatric:         Mood and Affect: Mood normal.

## 2024-09-11 NOTE — PROGRESS NOTES
Health Risk Assessment:   Patient rates overall health as excellent. Patient feels that their physical health rating is same. Patient is very satisfied with their life. Eyesight was rated as same. Hearing was rated as same. Patient feels that their emotional and mental health rating is same. Patients states they are never, rarely angry. Patient states they are never, rarely unusually tired/fatigued. Pain experienced in the last 7 days has been some. Patient's pain rating has been 1/10. Patient states that he has experienced no weight loss or gain in last 6 months.     Fall Risk Screening:   In the past year, patient has experienced: no history of falling in past year      Home Safety:  Patient does not have trouble with stairs inside or outside of their home. Patient has working smoke alarms and has no working carbon monoxide detector. Home safety hazards include: none.     Nutrition:   Current diet is Regular, Low Saturated Fat and Low Carb.     Medications:   Patient is currently taking over-the-counter supplements. OTC medications include: See list in my chart. Patient is able to manage medications.     Activities of Daily Living (ADLs)/Instrumental Activities of Daily Living (IADLs):   Walk and transfer into and out of bed and chair?: Yes  Dress and groom yourself?: Yes    Bathe or shower yourself?: Yes    Feed yourself? Yes  Do your laundry/housekeeping?: Yes  Manage your money, pay your bills and track your expenses?: Yes  Make your own meals?: Yes    Do your own shopping?: Yes    Previous Hospitalizations:   Any hospitalizations or ED visits within the last 12 months?: No      Advance Care Planning:   Living will: No    Durable POA for healthcare: No    Advanced directive: No      Screening, Brief Intervention, and Referral to Treatment (SBIRT)    Screening  Typical number of drinks in a day: 0  Typical number of drinks in a week: 2  Interpretation: Low risk drinking behavior.    AUDIT-C Screenin)  How often did you have a drink containing alcohol in the past year? 2 to 4 times a month  2) How many drinks did you have on a typical day when you were drinking in the past year? 0  3) How often did you have 6 or more drinks on one occasion in the past year? never    AUDIT-C Score: 2  Interpretation: Score 0-3 (male): Negative screen for alcohol misuse    Single Item Drug Screening:  How often have you used an illegal drug (including marijuana) or a prescription medication for non-medical reasons in the past year? never    Single Item Drug Screen Score: 0  Interpretation: Negative screen for possible drug use disorder

## 2024-09-16 NOTE — ASSESSMENT & PLAN NOTE
- BP of 134/71 in office today; goal of <140/80 given age   - Patient currently on amlodipine 10 mg QD  - Patient tolerating medication without any adverse side effects  - Reports BP typically being well controlled    Plan  - Encourage patient to keep a BP log for personal review and to be brought in at follow up visits for review  - Encourage lifestyle changes with increased exercise and fiber intake  - Continue to monitor

## 2024-10-23 ENCOUNTER — APPOINTMENT (OUTPATIENT)
Dept: LAB | Facility: CLINIC | Age: 69
End: 2024-10-23
Payer: MEDICARE

## 2024-10-23 DIAGNOSIS — R71.8 LOW MEAN CORPUSCULAR VOLUME (MCV): ICD-10-CM

## 2024-10-23 LAB — PSA SERPL-MCNC: 5.39 NG/ML (ref 0–4)

## 2024-10-29 ENCOUNTER — OFFICE VISIT (OUTPATIENT)
Dept: UROLOGY | Facility: CLINIC | Age: 69
End: 2024-10-29
Payer: MEDICARE

## 2024-10-29 VITALS
DIASTOLIC BLOOD PRESSURE: 80 MMHG | OXYGEN SATURATION: 98 % | WEIGHT: 197 LBS | SYSTOLIC BLOOD PRESSURE: 140 MMHG | HEART RATE: 59 BPM | BODY MASS INDEX: 29.86 KG/M2 | HEIGHT: 68 IN

## 2024-10-29 DIAGNOSIS — R97.20 ELEVATED PSA: Primary | ICD-10-CM

## 2024-10-29 PROCEDURE — 99213 OFFICE O/P EST LOW 20 MIN: CPT | Performed by: PHYSICIAN ASSISTANT

## 2024-10-30 NOTE — PROGRESS NOTES
UROLOGY PROGRESS NOTE   Patient Identifiers: Dom Malone (MRN [id number])  Date of Service: 10/29/2024    Subjective:   69-year-old man history of elevated PSA.  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  Current PSA 5.39.  MRI in 2023 was PI-RADS 2 with 49 g prostate.    Reason for visit: Elevated PSA follow-up    Objective:     VITALS:    Vitals:    10/29/24 1112   BP: 140/80   Pulse: 59   SpO2: 98%     AUA SYMPTOM SCORE      Flowsheet Row Most Recent Value   AUA SYMPTOM SCORE    How often have you had a sensation of not emptying your bladder completely after you finished urinating? 1 (P)     How often have you had to urinate again less than two hours after you finished urinating? 1 (P)     How often have you found you stopped and started again several times when you urinate? 1 (P)     How often have you found it difficult to postpone urination? 1 (P)     How often have you had a weak urinary stream? 1 (P)     How often have you had to push or strain to begin urination? 1 (P)     How many times did you most typically get up to urinate from the time you went to bed at night until the time you got up in the morning? 1 (P)     Quality of Life: If you were to spend the rest of your life with your urinary condition just the way it is now, how would you feel about that? 1 (P)     AUA SYMPTOM SCORE 7 (P)                LABS:  Lab Results   Component Value Date    HGB 11.1 (L) 09/03/2024    HCT 36.3 (L) 09/03/2024    WBC 6.21 09/03/2024     09/03/2024   ]    Lab Results   Component Value Date     01/30/2015    K 3.4 (L) 09/03/2024     09/03/2024    CO2 31 09/03/2024    BUN 15 09/03/2024    CREATININE 0.92 09/03/2024    CALCIUM 9.1 09/03/2024    GLUCOSE 86 01/30/2015   ]        INPATIENT MEDS:    Current Outpatient Medications:     amLODIPine (NORVASC) 10 mg tablet, TAKE 1 TABLET BY MOUTH EVERY DAY, Disp: 90 tablet, Rfl: 1    Ascorbic Acid (VITAMIN C) 1000 MG tablet, Take  "2 tablets by mouth daily, Disp: , Rfl:     B Complex Vitamins (VITAMIN B-COMPLEX 100 IJ), Take by mouth, Disp: , Rfl:     BETA CAROTENE PO, Take by mouth, Disp: , Rfl:     CHROMIUM PO, Take by mouth, Disp: , Rfl:     coenzyme Q-10 100 MG capsule, Take by mouth, Disp: , Rfl:     Klor-Con M20 20 MEQ tablet, TAKE 1 TABLET BY MOUTH EVERY DAY, Disp: 90 tablet, Rfl: 1    Lactobacillus (ACIDOPHILUS PO), Take by mouth, Disp: , Rfl:     Multiple Vitamin-Folic Acid TABS, Take by mouth, Disp: , Rfl:     olmesartan-hydrochlorothiazide (BENICAR HCT) 40-25 MG per tablet, TAKE 1 TABLET BY MOUTH EVERY DAY, Disp: 90 tablet, Rfl: 1    Omega-3 Fatty Acids (FISH OIL) 1,000 mg, Take 2 tablets by mouth daily, Disp: , Rfl:     rosuvastatin (CRESTOR) 5 mg tablet, TAKE 1 TABLET BY MOUTH EVERY DAY, Disp: 90 tablet, Rfl: 1    vitamin E, tocopherol, 400 units capsule, Take 400 Units by mouth 2 (two) times a day, Disp: , Rfl:       Physical Exam:   /80 (BP Location: Left arm, Patient Position: Sitting, Cuff Size: Standard)   Pulse 59   Ht 5' 8\" (1.727 m)   Wt 89.4 kg (197 lb)   SpO2 98%   BMI 29.95 kg/m²   GEN: no acute distress    RESP: breathing comfortably with no accessory muscle use    ABD: soft, non-tender, non-distended   INCISION:    EXT: no significant peripheral edema     RADIOLOGY:   None    Assessment:   #1.  Elevated PSA    Plan:   -Follow-up in 6 months with PSA prior to visit  -If his PSA continues to elevate will need a prostate biopsy  -  -          "

## 2024-12-11 DIAGNOSIS — I10 ESSENTIAL HYPERTENSION: ICD-10-CM

## 2024-12-11 DIAGNOSIS — E78.00 ELEVATED LDL CHOLESTEROL LEVEL: ICD-10-CM

## 2024-12-11 DIAGNOSIS — Z91.89 FRAMINGHAM CARDIAC RISK 10-20% IN NEXT 10 YEARS: ICD-10-CM

## 2024-12-11 DIAGNOSIS — E87.6 LOW SERUM POTASSIUM LEVEL: ICD-10-CM

## 2024-12-11 NOTE — TELEPHONE ENCOUNTER
Reason for call:   [x] Refill   [] Prior Auth  [x] Other: Patient needs scripts filled under new PCP     Office:   [x] PCP/Provider - Mustapha Landeros DO  [] Specialty/Provider -     Medication: amLODIPine (NORVASC) 10 mg tablet   Dose/Frequency: as directed  Quantity: 90    Medication:   rosuvastatin (CRESTOR) 5 mg tablet   Dose/Frequency: TAKE 1 TABLET BY MOUTH EVERY DAY   Quantity: 90    Medication: olmesartan-hydrochlorothiazide (BENICAR HCT) 40-25 MG per tablet   Dose/Frequency: 1 tablet  Quantity: 90    Medication: Klor-Con M20 20 MEQ tablet   Dose/Frequency: 20 mEq  Quantity: 90    Pharmacy: [facility]/pharmacy #[id number]  [place], [place] - [address]    Does the patient have enough for 3 days?   [x] Yes   [] No - Send as HP to POD

## 2024-12-12 RX ORDER — AMLODIPINE BESYLATE 10 MG/1
10 TABLET ORAL DAILY
Qty: 90 TABLET | Refills: 1 | Status: SHIPPED | OUTPATIENT
Start: 2024-12-12

## 2024-12-12 RX ORDER — POTASSIUM CHLORIDE 1500 MG/1
20 TABLET, EXTENDED RELEASE ORAL DAILY
Qty: 90 TABLET | Refills: 1 | Status: SHIPPED | OUTPATIENT
Start: 2024-12-12

## 2024-12-12 RX ORDER — ROSUVASTATIN CALCIUM 5 MG/1
5 TABLET, COATED ORAL DAILY
Qty: 90 TABLET | Refills: 1 | Status: SHIPPED | OUTPATIENT
Start: 2024-12-12

## 2024-12-12 RX ORDER — OLMESARTAN MEDOXOMIL AND HYDROCHLOROTHIAZIDE 40/25 40; 25 MG/1; MG/1
1 TABLET ORAL DAILY
Qty: 90 TABLET | Refills: 1 | Status: SHIPPED | OUTPATIENT
Start: 2024-12-12

## 2025-02-24 ENCOUNTER — APPOINTMENT (OUTPATIENT)
Dept: LAB | Facility: CLINIC | Age: 70
End: 2025-02-24
Payer: MEDICARE

## 2025-02-24 DIAGNOSIS — R97.20 ELEVATED PSA: ICD-10-CM

## 2025-02-24 LAB
FERRITIN SERPL-MCNC: 64 NG/ML (ref 24–336)
IRON SATN MFR SERPL: 43 % (ref 15–50)
IRON SERPL-MCNC: 157 UG/DL (ref 50–212)
PSA SERPL-MCNC: 6.55 NG/ML (ref 0–4)
TIBC SERPL-MCNC: 364 UG/DL (ref 250–450)
TRANSFERRIN SERPL-MCNC: 260 MG/DL (ref 203–362)
UIBC SERPL-MCNC: 207 UG/DL (ref 155–355)

## 2025-02-24 PROCEDURE — 84153 ASSAY OF PSA TOTAL: CPT

## 2025-03-11 ENCOUNTER — OFFICE VISIT (OUTPATIENT)
Dept: FAMILY MEDICINE CLINIC | Facility: CLINIC | Age: 70
End: 2025-03-11
Payer: MEDICARE

## 2025-03-11 VITALS
HEART RATE: 62 BPM | BODY MASS INDEX: 29.8 KG/M2 | WEIGHT: 196 LBS | TEMPERATURE: 98 F | DIASTOLIC BLOOD PRESSURE: 77 MMHG | OXYGEN SATURATION: 98 % | SYSTOLIC BLOOD PRESSURE: 140 MMHG

## 2025-03-11 DIAGNOSIS — K76.0 FATTY LIVER: ICD-10-CM

## 2025-03-11 DIAGNOSIS — Z15.89 MONOALLELIC MUTATION OF ATM GENE: ICD-10-CM

## 2025-03-11 DIAGNOSIS — E78.00 ELEVATED LDL CHOLESTEROL LEVEL: ICD-10-CM

## 2025-03-11 DIAGNOSIS — Z15.01 MONOALLELIC MUTATION OF ATM GENE: ICD-10-CM

## 2025-03-11 DIAGNOSIS — I10 ESSENTIAL HYPERTENSION: ICD-10-CM

## 2025-03-11 DIAGNOSIS — Z00.00 ENCOUNTER FOR HEALTH CHECK: Primary | ICD-10-CM

## 2025-03-11 DIAGNOSIS — Z15.09 MONOALLELIC MUTATION OF ATM GENE: ICD-10-CM

## 2025-03-11 PROCEDURE — 99213 OFFICE O/P EST LOW 20 MIN: CPT | Performed by: FAMILY MEDICINE

## 2025-03-11 PROCEDURE — G2211 COMPLEX E/M VISIT ADD ON: HCPCS | Performed by: FAMILY MEDICINE

## 2025-03-11 RX ORDER — ASPIRIN 81 MG/1
81 TABLET, CHEWABLE ORAL DAILY
COMMUNITY

## 2025-03-11 NOTE — PROGRESS NOTES
Name: Dom Malone Jr.      : 1955      MRN: [id number]  Encounter Provider: Mustapha Landeros DO  Encounter Date: 3/11/2025   Encounter department: [facility]    Assessment & Plan  Encounter for health check  -Patient saw any acute complaints or concerns at this time  - Patient denies any recent notes or change in status       Essential hypertension  -BP of 140/77 in office today  - Patient currently on amlodipine 10 mg QD  - Patient tolerating medication without any adverse side effects  - Reports BP typically being well controlled    Plan  - Encourage patient to keep a BP log for personal review and to be brought in at follow up visits for review  - Encourage lifestyle changes with increased exercise and fiber intake  - Continue to monitor       Fatty liver  -As noted on right upper quadrant ultrasound imaging obtained in 2023  - Imaging at the time noting an enlarged liver with marked fatty infiltration  - Most recent labs without any significant elevation in liver enzymes  - Patient denies any right upper quadrant abdominal pain  - Patient denies any excessive alcohol use    Plan  - Continue patient on statin therapy  - Encourage weight loss  - Advised patient to avoid excessive alcohol intake  - Will continue to monitor          Monoallelic mutation of OSMANY gene  - Patient at increased risk of breast, prostate, pancreatic, colorectal and possible hematologic forms of cancer  - Followed by Urology regularly; PSA elevated but stable, no urinary changes   - Last colonoscopy 1 year ago with recommendation in 3-5 years given OSMANY mutation; noted incarcerated hemorrhoids only  - Recent blood work unremarkable given underlying disease processes     Plan  - Consider Iron panel follow up to r/o underlying iron deficiency on top of beta thalassemia given patients microcytic anemia (MCV ranges as low as 55 are possibly expected however in a patient with beta thalassemia  minor)  - continue routine lab monitoring for any blood dyscrasias               History of Present Illness   Patient presents to clinic today for routine health examination without any acute complaints or concerns.  Patient denies any recent change in health status.      Review of Systems   Constitutional:  Negative for chills, fatigue and fever.   HENT:  Negative for ear pain and sore throat.    Eyes:  Negative for pain and visual disturbance.   Respiratory:  Negative for cough and shortness of breath.    Cardiovascular:  Negative for chest pain and palpitations.   Gastrointestinal:  Negative for abdominal pain and vomiting.   Genitourinary:  Negative for decreased urine volume, difficulty urinating, dysuria, hematuria and urgency.   Musculoskeletal:  Negative for arthralgias and back pain.   Skin:  Negative for color change and rash.   Neurological:  Negative for dizziness, light-headedness and headaches.   All other systems reviewed and are negative.      Objective   /77 (BP Location: Left arm, Patient Position: Sitting, Cuff Size: Standard)   Pulse 62   Temp 98 °F (36.7 °C)   Wt 88.9 kg (196 lb)   SpO2 98%   BMI 29.80 kg/m²      Physical Exam  Vitals and nursing note reviewed.   Constitutional:       General: He is not in acute distress.     Appearance: Normal appearance. He is well-developed. He is not ill-appearing.   HENT:      Head: Normocephalic and atraumatic.      Right Ear: External ear normal.      Left Ear: External ear normal.      Nose: Nose normal.   Eyes:      Conjunctiva/sclera: Conjunctivae normal.   Cardiovascular:      Rate and Rhythm: Normal rate and regular rhythm.      Pulses: Normal pulses.      Heart sounds: Normal heart sounds. No murmur heard.  Pulmonary:      Effort: Pulmonary effort is normal. No respiratory distress.      Breath sounds: Normal breath sounds.   Abdominal:      Palpations: Abdomen is soft.      Tenderness: There is no abdominal tenderness.   Musculoskeletal:          General: No swelling.      Cervical back: Neck supple.      Right lower leg: No edema.      Left lower leg: No edema.   Skin:     General: Skin is warm and dry.      Capillary Refill: Capillary refill takes less than 2 seconds.   Neurological:      Mental Status: He is alert.   Psychiatric:         Mood and Affect: Mood normal.

## 2025-03-14 ENCOUNTER — TELEPHONE (OUTPATIENT)
Age: 70
End: 2025-03-14

## 2025-03-14 DIAGNOSIS — R97.20 ELEVATED PSA: Primary | ICD-10-CM

## 2025-03-14 NOTE — ASSESSMENT & PLAN NOTE
-BP of 140/77 in office today  - Patient currently on amlodipine 10 mg QD  - Patient tolerating medication without any adverse side effects  - Reports BP typically being well controlled    Plan  - Encourage patient to keep a BP log for personal review and to be brought in at follow up visits for review  - Encourage lifestyle changes with increased exercise and fiber intake  - Continue to monitor

## 2025-03-14 NOTE — TELEPHONE ENCOUNTER
"Pt under care of:  Joseluis HEAD  Office Location: [facility]    Last Seen (include Follow Up recommendations of last visit- see Office Visit - Instructions): Last seen 10/29/2024, recommended \"  Return in about 4 months (around 2/28/2025) for psa prior to visit    Pt calling due to scheduling the follow up appt    Active Symptoms?  Explain: N/A    Pt can be reached at:[phone]    Appointment Details  Date:  5/8/2025  Time:    9:45 am  Location:   [facility]  Provider:  Joseluis HEAD    Does the appointment need further review? (Reason) N/A    "

## 2025-03-14 NOTE — TELEPHONE ENCOUNTER
Repeat PSA ordered to be obtained prior to his follow-up appointment in May 2025 to better determine next steps when it comes to his history of elevated PSA.

## 2025-03-14 NOTE — ASSESSMENT & PLAN NOTE
-As noted on right upper quadrant ultrasound imaging obtained in September 2023  - Imaging at the time noting an enlarged liver with marked fatty infiltration  - Most recent labs without any significant elevation in liver enzymes  - Patient denies any right upper quadrant abdominal pain  - Patient denies any excessive alcohol use    Plan  - Continue patient on statin therapy  - Encourage weight loss  - Advised patient to avoid excessive alcohol intake  - Will continue to monitor

## 2025-03-14 NOTE — TELEPHONE ENCOUNTER
Patient called to schedule follow up appt, see previous encounter.     He was supposed to be see in 4 months from October. Patient took a cancellation Appt for May.     Patient did have his PSA completed in Feb like he was supposed to.     Patient was wondering if he needed another one completed for the Mat appt?    Can this please be reviewed with the provider?    Patient would appreciate a call back to let him know.

## 2025-04-24 ENCOUNTER — APPOINTMENT (OUTPATIENT)
Dept: LAB | Facility: CLINIC | Age: 70
End: 2025-04-24
Payer: MEDICARE

## 2025-04-24 DIAGNOSIS — R97.20 ELEVATED PSA: ICD-10-CM

## 2025-04-24 PROCEDURE — 36415 COLL VENOUS BLD VENIPUNCTURE: CPT

## 2025-04-24 PROCEDURE — 84153 ASSAY OF PSA TOTAL: CPT

## 2025-04-25 LAB — PSA SERPL-MCNC: 5.68 NG/ML (ref 0–4)

## 2025-05-06 ENCOUNTER — OFFICE VISIT (OUTPATIENT)
Dept: PODIATRY | Facility: CLINIC | Age: 70
End: 2025-05-06
Payer: MEDICARE

## 2025-05-06 VITALS — HEIGHT: 68 IN | WEIGHT: 204.8 LBS | BODY MASS INDEX: 31.04 KG/M2

## 2025-05-06 DIAGNOSIS — L60.0 INGROWING NAIL: ICD-10-CM

## 2025-05-06 DIAGNOSIS — L03.031 CELLULITIS OF TOE, RIGHT: Primary | ICD-10-CM

## 2025-05-06 PROCEDURE — 99213 OFFICE O/P EST LOW 20 MIN: CPT | Performed by: PODIATRIST

## 2025-05-06 RX ORDER — CEPHALEXIN 500 MG/1
500 CAPSULE ORAL EVERY 8 HOURS SCHEDULED
Qty: 21 CAPSULE | Refills: 0 | Status: SHIPPED | OUTPATIENT
Start: 2025-05-06 | End: 2025-05-13

## 2025-05-06 NOTE — PROGRESS NOTES
PATIENT:  Dom Malone Jr.  1955       ASSESSMENT:     1. Cellulitis of toe, right  cephalexin (KEFLEX) 500 mg capsule      2. Ingrowing nail                  PLAN:  1. Reviewed medical records/  Patient was counseled and educated on the condition and the diagnosis.    2. The diagnosis, treatment options and prognosis were discussed with the patient.    3. Rx: Keflex for mild cellulitis.  Slanted back medial and lateral nail border of right great toe.  Discussed options and will schedule him for P&A of bilateral nail borders.    4. Home care instructions / warm soaking were given to the patient including decreased activity.  5. Pt scheduled for P&A.       Imaging: I have personally reviewed pertinent films in PACS  Labs, pathology, and Other Studies: I have personally reviewed pertinent reports.      Procedures      Subjective:       HPI  The patient presents with chief complaint of recurring pain in right great toe.  He has been dealing with it in the past 1 year.  It is little red and tender to touch.  History of infection in right great toenail with nail avulsion.  Denied injury.  No history of diabetes.  No associated numbness or paresthesia.  No significant weakness or dysfunction.         The following portions of the patient's history were reviewed and updated as appropriate: allergies, current medications, past family history, past medical history, past social history, past surgical history and problem list.  All pertinent labs and images were reviewed.      Past Medical History  Past Medical History:   Diagnosis Date    Allergic 1998    Anemia 1975    Cervicalgia 10/29/2014    Colon polyp     Fatty liver     Hyperlipidemia     Hypertension 2006    Ingrown toenail 3/14/23    Prostatic hypertrophy     Benign       Past Surgical History  Past Surgical History:   Procedure Laterality Date    COLONOSCOPY      SKIN TAG REMOVAL      VASECTOMY      Vas Deferens         Allergies:  Albuterol    Medications:  Current Outpatient Medications   Medication Sig Dispense Refill    amLODIPine (NORVASC) 10 mg tablet Take 1 tablet (10 mg total) by mouth daily 90 tablet 1    Ascorbic Acid (VITAMIN C) 1000 MG tablet Take 2 tablets by mouth daily      aspirin 81 mg chewable tablet Chew 81 mg daily      B Complex Vitamins (VITAMIN B-COMPLEX 100 IJ) Take by mouth      BETA CAROTENE PO Take by mouth      cephalexin (KEFLEX) 500 mg capsule Take 1 capsule (500 mg total) by mouth every 8 (eight) hours for 7 days 21 capsule 0    CHROMIUM PO Take by mouth      coenzyme Q-10 100 MG capsule Take by mouth      Lactobacillus (ACIDOPHILUS PO) Take by mouth      Multiple Vitamin-Folic Acid TABS Take by mouth      olmesartan-hydrochlorothiazide (BENICAR HCT) 40-25 MG per tablet Take 1 tablet by mouth daily 90 tablet 1    Omega-3 Fatty Acids (FISH OIL) 1,000 mg Take 2 tablets by mouth daily      potassium chloride (Klor-Con M20) 20 mEq tablet Take 1 tablet (20 mEq total) by mouth daily 90 tablet 1    rosuvastatin (CRESTOR) 5 mg tablet Take 1 tablet (5 mg total) by mouth daily 90 tablet 1    vitamin E, tocopherol, 400 units capsule Take 400 Units by mouth 2 (two) times a day       No current facility-administered medications for this visit.       Social History:  Social History     Socioeconomic History    Marital status:      Spouse name: None    Number of children: None    Years of education: None    Highest education level: None   Occupational History    Occupation: Retired, IT/   Tobacco Use    Smoking status: Never    Smokeless tobacco: Never   Vaping Use    Vaping status: Never Used   Substance and Sexual Activity    Alcohol use: Yes     Alcohol/week: 5.0 standard drinks of alcohol     Types: 3 Glasses of wine, 2 Cans of beer per week     Comment: drinks wine    Drug use: No    Sexual activity: Not Currently   Other Topics Concern    None   Social History Narrative    Exercise habits  "   Living situations, wife and dog    Uses safety equipment- Seatbelts     Social Drivers of Health     Financial Resource Strain: Low Risk  (8/30/2023)    Overall Financial Resource Strain (CARDIA)     Difficulty of Paying Living Expenses: Not hard at all   Food Insecurity: No Food Insecurity (9/10/2024)    Nursing - Inadequate Food Risk Classification     Worried About Running Out of Food in the Last Year: Never true     Ran Out of Food in the Last Year: Never true     Ran Out of Food in the Last Year: Not on file   Transportation Needs: No Transportation Needs (9/10/2024)    PRAPARE - Transportation     Lack of Transportation (Medical): No     Lack of Transportation (Non-Medical): No   Physical Activity: Not on file   Stress: Not on file   Social Connections: Not on file   Intimate Partner Violence: Not on file   Housing Stability: Low Risk  (9/10/2024)    Housing Stability Vital Sign     Unable to Pay for Housing in the Last Year: No     Number of Times Moved in the Last Year: 0     Homeless in the Last Year: No          Review of Systems   Constitutional:  Negative for chills and fever.   HENT:  Negative for sore throat.    Respiratory:  Negative for cough and shortness of breath.    Cardiovascular:  Negative for chest pain and leg swelling.   Gastrointestinal:  Negative for nausea and vomiting.   Musculoskeletal:  Negative for gait problem.   Allergic/Immunologic: Negative for immunocompromised state.   Neurological:  Negative for weakness and numbness.   Hematological: Negative.    Psychiatric/Behavioral:  Negative for behavioral problems and confusion.          Objective:      Ht 5' 8\" (1.727 m)   Wt 92.9 kg (204 lb 12.8 oz)   BMI 31.14 kg/m²          Physical Exam  Vitals reviewed.   Constitutional:       General: He is not in acute distress.     Appearance: He is not ill-appearing or toxic-appearing.   Cardiovascular:      Rate and Rhythm: Normal rate and regular rhythm.      Pulses: Normal pulses.       "     Dorsalis pedis pulses are 2+ on the right side and 2+ on the left side.        Posterior tibial pulses are 2+ on the right side and 2+ on the left side.   Pulmonary:      Effort: Pulmonary effort is normal. No respiratory distress.   Musculoskeletal:         General: No signs of injury. Normal range of motion.      Right lower leg: No edema.      Left lower leg: No edema.      Right foot: No foot drop.      Left foot: No foot drop.   Skin:     General: Skin is warm.      Capillary Refill: Capillary refill takes less than 2 seconds.      Coloration: Skin is not cyanotic or mottled.      Findings: No laceration.      Nails: There is no clubbing.      Comments: Mild redness lateral and medial nail border right great toe.  No areli pus.     Neurological:      General: No focal deficit present.      Mental Status: He is alert and oriented to person, place, and time.      Cranial Nerves: No cranial nerve deficit.      Sensory: No sensory deficit.      Motor: No weakness.      Coordination: Coordination normal.   Psychiatric:         Mood and Affect: Mood normal.         Behavior: Behavior normal.         Thought Content: Thought content normal.

## 2025-05-08 ENCOUNTER — OFFICE VISIT (OUTPATIENT)
Dept: UROLOGY | Facility: CLINIC | Age: 70
End: 2025-05-08
Payer: MEDICARE

## 2025-05-08 VITALS
BODY MASS INDEX: 29.86 KG/M2 | WEIGHT: 197 LBS | OXYGEN SATURATION: 98 % | DIASTOLIC BLOOD PRESSURE: 82 MMHG | SYSTOLIC BLOOD PRESSURE: 150 MMHG | HEART RATE: 65 BPM | HEIGHT: 68 IN

## 2025-05-08 DIAGNOSIS — R97.20 ELEVATED PSA: Primary | ICD-10-CM

## 2025-05-08 PROCEDURE — 99213 OFFICE O/P EST LOW 20 MIN: CPT | Performed by: PHYSICIAN ASSISTANT

## 2025-05-08 NOTE — PROGRESS NOTES
"Name: Dom Malone Jr.      : 1955      MRN: [id number]  Encounter Provider: Chaz Barrera PA-C  Encounter Date: 2025   Encounter department: [facility]  :  Assessment & Plan  Elevated PSA    Orders:    MRI prostate multiparametric wo w contrast; Future    PSA Total, Diagnostic; Future  Will get an MRI now and follow-up in 6 months with PSA prior to visit  I will call him with any significant findings      History of Present Illness   Dom Malone Jr. is a 70 y.o. male who presents history of elevated PSA.  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  His last PSA was 5.39.  Current PSA is 5.68.  No significant voiding complaints.  No family history of prostate cancer.  AUA SYMPTOM SCORE      Flowsheet Row Most Recent Value   AUA SYMPTOM SCORE    How often have you had a sensation of not emptying your bladder completely after you finished urinating? 1 (P)     How often have you had to urinate again less than two hours after you finished urinating? 1 (P)     How often have you found you stopped and started again several times when you urinate? 1 (P)     How often have you found it difficult to postpone urination? 1 (P)     How often have you had a weak urinary stream? 1 (P)     How often have you had to push or strain to begin urination? 1 (P)     How many times did you most typically get up to urinate from the time you went to bed at night until the time you got up in the morning? 1 (P)     Quality of Life: If you were to spend the rest of your life with your urinary condition just the way it is now, how would you feel about that? 1 (P)     AUA SYMPTOM SCORE 7 (P)            Review of Systems       Objective   /82 (BP Location: Left arm, Patient Position: Sitting, Cuff Size: Standard)   Pulse 65   Ht 5' 8\" (1.727 m)   Wt 89.4 kg (197 lb)   SpO2 98%   BMI 29.95 kg/m²     Vitals:    25 0942   BP: 150/82   Pulse: 65 "   SpO2: 98%     AUA SYMPTOM SCORE      Flowsheet Row Most Recent Value   AUA SYMPTOM SCORE    How often have you had a sensation of not emptying your bladder completely after you finished urinating? 1 (P)     How often have you had to urinate again less than two hours after you finished urinating? 1 (P)     How often have you found you stopped and started again several times when you urinate? 1 (P)     How often have you found it difficult to postpone urination? 1 (P)     How often have you had a weak urinary stream? 1 (P)     How often have you had to push or strain to begin urination? 1 (P)     How many times did you most typically get up to urinate from the time you went to bed at night until the time you got up in the morning? 1 (P)     Quality of Life: If you were to spend the rest of your life with your urinary condition just the way it is now, how would you feel about that? 1 (P)     AUA SYMPTOM SCORE 7 (P)                LABS:  Lab Results   Component Value Date    HGB 11.1 (L) 09/03/2024    HCT 36.3 (L) 09/03/2024    WBC 6.21 09/03/2024     09/03/2024   ]    Lab Results   Component Value Date     01/30/2015    K 3.4 (L) 09/03/2024     09/03/2024    CO2 31 09/03/2024    BUN 15 09/03/2024    CREATININE 0.92 09/03/2024    CALCIUM 9.1 09/03/2024    GLUCOSE 86 01/30/2015   ]        INPATIENT MEDS:    Current Outpatient Medications:     amLODIPine (NORVASC) 10 mg tablet, Take 1 tablet (10 mg total) by mouth daily, Disp: 90 tablet, Rfl: 1    Ascorbic Acid (VITAMIN C) 1000 MG tablet, Take 2 tablets by mouth daily, Disp: , Rfl:     aspirin 81 mg chewable tablet, Chew 81 mg daily, Disp: , Rfl:     B Complex Vitamins (VITAMIN B-COMPLEX 100 IJ), Take by mouth, Disp: , Rfl:     BETA CAROTENE PO, Take by mouth, Disp: , Rfl:     cephalexin (KEFLEX) 500 mg capsule, Take 1 capsule (500 mg total) by mouth every 8 (eight) hours for 7 days, Disp: 21 capsule, Rfl: 0    CHROMIUM PO, Take by mouth, Disp: , Rfl:  "    coenzyme Q-10 100 MG capsule, Take by mouth, Disp: , Rfl:     Lactobacillus (ACIDOPHILUS PO), Take by mouth, Disp: , Rfl:     Multiple Vitamin-Folic Acid TABS, Take by mouth, Disp: , Rfl:     olmesartan-hydrochlorothiazide (BENICAR HCT) 40-25 MG per tablet, Take 1 tablet by mouth daily, Disp: 90 tablet, Rfl: 1    Omega-3 Fatty Acids (FISH OIL) 1,000 mg, Take 2 tablets by mouth daily, Disp: , Rfl:     potassium chloride (Klor-Con M20) 20 mEq tablet, Take 1 tablet (20 mEq total) by mouth daily, Disp: 90 tablet, Rfl: 1    rosuvastatin (CRESTOR) 5 mg tablet, Take 1 tablet (5 mg total) by mouth daily, Disp: 90 tablet, Rfl: 1    vitamin E, tocopherol, 400 units capsule, Take 400 Units by mouth 2 (two) times a day, Disp: , Rfl:       Physical Exam:   /82 (BP Location: Left arm, Patient Position: Sitting, Cuff Size: Standard)   Pulse 65   Ht 5' 8\" (1.727 m)   Wt 89.4 kg (197 lb)   SpO2 98%   BMI 29.95 kg/m²   GEN: no acute distress    RESP: breathing comfortably with no accessory muscle use    ABD: soft, non-tender, non-distended   INCISION:    EXT: no significant peripheral edema       RADIOLOGY:   none -      Results   Lab Results   Component Value Date    PSA 5.682 (H) 04/24/2025    PSA 6.547 (H) 02/24/2025    PSA 5.395 (H) 10/23/2024     Lab Results   Component Value Date    GLUCOSE 86 01/30/2015    CALCIUM 9.1 09/03/2024     01/30/2015    K 3.4 (L) 09/03/2024    CO2 31 09/03/2024     09/03/2024    BUN 15 09/03/2024    CREATININE 0.92 09/03/2024     Lab Results   Component Value Date    WBC 6.21 09/03/2024    HGB 11.1 (L) 09/03/2024    HCT 36.3 (L) 09/03/2024    MCV 63 (L) 09/03/2024     09/03/2024       Office Urine Dip  No results found for this or any previous visit (from the past hour).      "

## 2025-05-14 ENCOUNTER — TELEPHONE (OUTPATIENT)
Age: 70
End: 2025-05-14

## 2025-05-14 ENCOUNTER — OFFICE VISIT (OUTPATIENT)
Dept: PODIATRY | Facility: CLINIC | Age: 70
End: 2025-05-14
Payer: MEDICARE

## 2025-05-14 VITALS — WEIGHT: 197 LBS | BODY MASS INDEX: 29.86 KG/M2 | HEIGHT: 68 IN

## 2025-05-14 DIAGNOSIS — L60.0 INGROWING NAIL: Primary | ICD-10-CM

## 2025-05-14 PROCEDURE — 11750 EXCISION NAIL&NAIL MATRIX: CPT | Performed by: PODIATRIST

## 2025-05-14 NOTE — PROGRESS NOTES
"    Discussed options with the patient.  The patient wished to proceed with P&A.  See procedure.    Home care instructions were given to the patient including decreased activity, ice and home pain medication as needed for 3 days. Patient will also perform daily dressing changes until the surgical site is fully healed.      Nail removal    Date/Time: 5/14/2025 11:15 AM    Performed by: Jean Urias DPM  Authorized by: Jean Urias DPM    Patient location:  [facility] Protocol:  Consent: Verbal consent obtained  Risks and benefits: risks, benefits and alternatives were discussed  Consent given by: patient  Time out: Immediately prior to procedure a \"time out\" was called to verify the correct patient, procedure, equipment, support staff and site/side marked as required.  Timeout called at: 5/14/2025 11:35 AM.  Patient understanding: patient states understanding of the procedure being performed  Site marked: the operative site was marked  Patient identity confirmed: verbally with patient    Location:     Foot:  R big toe  Pre-procedure details:     Skin preparation:  Betadine    Preparation: Patient was prepped and draped in the usual sterile fashion    Anesthesia (see MAR for exact dosages):     Anesthesia method:  Local infiltration    Local anesthetic:  Lidocaine 1% w/o epi  Nail Removal:     Nail removed:  Partial    Nail side:  Medial (and lateral)  Ingrown nail:     Nail matrix removed or ablated:  Partial  Post-procedure details:     Dressing:  4x4 sterile gauze, antibiotic ointment and gauze roll    Patient tolerance of procedure:  Tolerated well, no immediate complications    "

## 2025-05-14 NOTE — TELEPHONE ENCOUNTER
Caller: Joby Malone Jr.     Doctor and/or Office: Dr. Urias/Charito    #: [phone]    Escalation: Appointment Patient is coming in for a P & A this morning. He wants to confirm he can drive after? Please return call. Thank you

## 2025-06-10 ENCOUNTER — HOSPITAL ENCOUNTER (OUTPATIENT)
Dept: RADIOLOGY | Age: 70
Discharge: HOME | End: 2025-06-10
Attending: PHYSICIAN ASSISTANT
Payer: MEDICARE

## 2025-06-10 DIAGNOSIS — R97.20 ELEVATED PSA: ICD-10-CM

## 2025-06-10 PROCEDURE — 72197 MRI PELVIS W/O & W/DYE: CPT

## 2025-06-10 PROCEDURE — 76377 3D RENDER W/INTRP POSTPROCES: CPT

## 2025-06-10 PROCEDURE — A9585 GADOBUTROL INJECTION: HCPCS | Performed by: PHYSICIAN ASSISTANT

## 2025-06-10 RX ORDER — GADOBUTROL 604.72 MG/ML
9 INJECTION INTRAVENOUS
Status: COMPLETED | OUTPATIENT
Start: 2025-06-10 | End: 2025-06-10

## 2025-06-10 RX ADMIN — GADOBUTROL 9 ML: 604.72 INJECTION INTRAVENOUS at 11:07

## 2025-06-11 DIAGNOSIS — I10 ESSENTIAL HYPERTENSION: ICD-10-CM

## 2025-06-11 RX ORDER — AMLODIPINE BESYLATE 10 MG/1
10 TABLET ORAL DAILY
Qty: 90 TABLET | Refills: 1 | Status: SHIPPED | OUTPATIENT
Start: 2025-06-11

## 2025-06-17 ENCOUNTER — PROCEDURE VISIT (OUTPATIENT)
Dept: PODIATRY | Facility: CLINIC | Age: 70
End: 2025-06-17
Payer: MEDICARE

## 2025-06-17 VITALS — WEIGHT: 197 LBS | BODY MASS INDEX: 29.86 KG/M2 | HEIGHT: 68 IN

## 2025-06-17 DIAGNOSIS — L60.0 INGROWING NAIL: Primary | ICD-10-CM

## 2025-06-17 PROCEDURE — 99212 OFFICE O/P EST SF 10 MIN: CPT | Performed by: PODIATRIST

## 2025-06-17 NOTE — PROGRESS NOTES
"        PATIENT:  Dom Malone Jr.      1955    ASSESSMENT     1. Ingrowing nail               PLAN  Patient is doing well.  Discussed proper nail care and preventive care.  Pt to return as needed.     HISTORY OF PRESENT ILLNESS  Patient presents for follow-up.  He is doing well with no pain.  No new complaint.        REVIEW OF SYSTEMS  GENERAL: No fever or chills.    HEART: No chest pain, or palpitation  RESPIRATORY:  No SOB or cough  GI: No Nausea, vomit or diarrhea    PHYSICAL EXAMINATION    Ht 5' 8\" (1.727 m)   Wt 89.4 kg (197 lb)   BMI 29.95 kg/m²     GENERAL  The patient appears in NAD / non-toxic. Afebrile. VSS    VASCULAR EXAM  Pedal pulses and vascular status are intact.  No cyanosis.    DERMATOLOGIC EXAM  Wounds healed right great toe.  No infection.      NEUROLOGIC EXAM  AAO X 3.  No focal neurologic deficit.  Neurologic status is intact BLE.    MUSCULOSKELETAL EXAM  ROM intact.  No fluctuation or crepitus.  "

## 2025-06-26 ENCOUNTER — TELEPHONE (OUTPATIENT)
Dept: UROLOGY | Facility: CLINIC | Age: 70
End: 2025-06-26

## 2025-06-26 NOTE — TELEPHONE ENCOUNTER
I spoke to the patient an reviewed his MRI results. Choice for office biopsy or continued surveillance. He will see me in November with PSA prior.

## 2025-07-01 DIAGNOSIS — I10 ESSENTIAL HYPERTENSION: ICD-10-CM

## 2025-07-01 RX ORDER — OLMESARTAN MEDOXOMIL AND HYDROCHLOROTHIAZIDE 40/25 40; 25 MG/1; MG/1
1 TABLET ORAL DAILY
Qty: 90 TABLET | Refills: 1 | Status: SHIPPED | OUTPATIENT
Start: 2025-07-01